# Patient Record
Sex: MALE | Race: WHITE | Employment: FULL TIME | URBAN - METROPOLITAN AREA
[De-identification: names, ages, dates, MRNs, and addresses within clinical notes are randomized per-mention and may not be internally consistent; named-entity substitution may affect disease eponyms.]

---

## 2018-02-16 PROBLEM — G47.9 SLEEP DISTURBANCE: Status: ACTIVE | Noted: 2018-02-16

## 2018-02-16 PROBLEM — F41.9 ANXIETY: Status: ACTIVE | Noted: 2018-02-16

## 2018-04-09 DIAGNOSIS — E78.00 PURE HYPERCHOLESTEROLEMIA: ICD-10-CM

## 2018-04-09 RX ORDER — ROSUVASTATIN CALCIUM 40 MG/1
40 TABLET, COATED ORAL DAILY
Qty: 90 TABLET | Refills: 1 | Status: SHIPPED | OUTPATIENT
Start: 2018-04-09 | End: 2018-09-04 | Stop reason: SDUPTHER

## 2018-09-04 ENCOUNTER — HOSPITAL ENCOUNTER (OUTPATIENT)
Age: 58
Discharge: HOME | End: 2018-09-06
Payer: COMMERCIAL

## 2018-09-04 ENCOUNTER — OFFICE VISIT (OUTPATIENT)
Dept: FAMILY MEDICINE CLINIC | Age: 58
End: 2018-09-04
Payer: COMMERCIAL

## 2018-09-04 VITALS
DIASTOLIC BLOOD PRESSURE: 78 MMHG | OXYGEN SATURATION: 96 % | BODY MASS INDEX: 27.44 KG/M2 | HEIGHT: 71 IN | SYSTOLIC BLOOD PRESSURE: 128 MMHG | TEMPERATURE: 98.1 F | WEIGHT: 196 LBS | RESPIRATION RATE: 20 BRPM | HEART RATE: 129 BPM

## 2018-09-04 DIAGNOSIS — Z00.00 ANNUAL PHYSICAL EXAM: Primary | ICD-10-CM

## 2018-09-04 DIAGNOSIS — G47.9 SLEEP DISTURBANCE: ICD-10-CM

## 2018-09-04 DIAGNOSIS — G47.00 INSOMNIA, UNSPECIFIED TYPE: ICD-10-CM

## 2018-09-04 DIAGNOSIS — M25.512 CHRONIC LEFT SHOULDER PAIN: ICD-10-CM

## 2018-09-04 DIAGNOSIS — I48.0 PAROXYSMAL ATRIAL FIBRILLATION (HCC): ICD-10-CM

## 2018-09-04 DIAGNOSIS — Z11.59 ENCOUNTER FOR HEPATITIS C SCREENING TEST FOR LOW RISK PATIENT: ICD-10-CM

## 2018-09-04 DIAGNOSIS — E78.00 PURE HYPERCHOLESTEROLEMIA: ICD-10-CM

## 2018-09-04 DIAGNOSIS — G89.29 CHRONIC LEFT SHOULDER PAIN: ICD-10-CM

## 2018-09-04 DIAGNOSIS — H91.92 HEARING LOSS OF LEFT EAR, UNSPECIFIED HEARING LOSS TYPE: ICD-10-CM

## 2018-09-04 DIAGNOSIS — K21.9 GASTROESOPHAGEAL REFLUX DISEASE, ESOPHAGITIS PRESENCE NOT SPECIFIED: ICD-10-CM

## 2018-09-04 DIAGNOSIS — Z23 NEED FOR INFLUENZA VACCINATION: ICD-10-CM

## 2018-09-04 PROCEDURE — 90686 IIV4 VACC NO PRSV 0.5 ML IM: CPT | Performed by: FAMILY MEDICINE

## 2018-09-04 PROCEDURE — 99396 PREV VISIT EST AGE 40-64: CPT | Performed by: FAMILY MEDICINE

## 2018-09-04 PROCEDURE — 86803 HEPATITIS C AB TEST: CPT

## 2018-09-04 PROCEDURE — 90471 IMMUNIZATION ADMIN: CPT | Performed by: FAMILY MEDICINE

## 2018-09-04 PROCEDURE — 99213 OFFICE O/P EST LOW 20 MIN: CPT | Performed by: FAMILY MEDICINE

## 2018-09-04 PROCEDURE — 93000 ELECTROCARDIOGRAM COMPLETE: CPT | Performed by: FAMILY MEDICINE

## 2018-09-04 RX ORDER — TRAZODONE HYDROCHLORIDE 50 MG/1
50 TABLET ORAL NIGHTLY
Qty: 90 TABLET | Refills: 1 | Status: SHIPPED | OUTPATIENT
Start: 2018-09-04 | End: 2019-02-13 | Stop reason: SDUPTHER

## 2018-09-04 RX ORDER — ATENOLOL 50 MG/1
50 TABLET ORAL DAILY
Qty: 90 TABLET | Refills: 1 | Status: SHIPPED | OUTPATIENT
Start: 2018-09-04 | End: 2019-02-13 | Stop reason: SDUPTHER

## 2018-09-04 RX ORDER — OMEPRAZOLE 20 MG/1
20 CAPSULE, DELAYED RELEASE ORAL DAILY
Qty: 90 CAPSULE | Refills: 1 | Status: SHIPPED | OUTPATIENT
Start: 2018-09-04 | End: 2019-02-13 | Stop reason: SDUPTHER

## 2018-09-04 RX ORDER — LANOLIN ALCOHOL/MO/W.PET/CERES
6 CREAM (GRAM) TOPICAL DAILY
Qty: 60 TABLET | Refills: 3 | Status: CANCELLED | OUTPATIENT
Start: 2018-09-04

## 2018-09-04 RX ORDER — ATENOLOL 50 MG/1
50 TABLET ORAL NIGHTLY
Qty: 30 TABLET | Refills: 0 | Status: SHIPPED | OUTPATIENT
Start: 2018-09-04 | End: 2019-01-10 | Stop reason: SDUPTHER

## 2018-09-04 RX ORDER — NAPROXEN 500 MG/1
500 TABLET ORAL 2 TIMES DAILY WITH MEALS
Qty: 28 TABLET | Refills: 0 | Status: SHIPPED | OUTPATIENT
Start: 2018-09-04 | End: 2018-12-21

## 2018-09-04 RX ORDER — ROSUVASTATIN CALCIUM 40 MG/1
40 TABLET, COATED ORAL DAILY
Qty: 90 TABLET | Refills: 1 | Status: SHIPPED | OUTPATIENT
Start: 2018-09-04 | End: 2019-02-13 | Stop reason: SDUPTHER

## 2018-09-04 ASSESSMENT — PATIENT HEALTH QUESTIONNAIRE - PHQ9
SUM OF ALL RESPONSES TO PHQ9 QUESTIONS 1 & 2: 0
SUM OF ALL RESPONSES TO PHQ QUESTIONS 1-9: 0
1. LITTLE INTEREST OR PLEASURE IN DOING THINGS: 0
2. FEELING DOWN, DEPRESSED OR HOPELESS: 0
SUM OF ALL RESPONSES TO PHQ QUESTIONS 1-9: 0

## 2018-09-04 NOTE — PROGRESS NOTES
FM Progress Note    Subjective: Joie Goodwin is a 62y.o. year old male here for Afib. Health Maintenance Due   Topic Date Due    Hepatitis C screen  1960    HIV screen  05/31/1975    Shingles Vaccine (1 of 2 - 2 Dose Series) 05/31/2010    Flu vaccine (1) 09/01/2018       Feels palps frequently. No increased frequency lately. Episodes last only briefly. Stress related, but stress better lately now that he's started work. Ran out of atenolol last night. L shoulder pain for 6 months. No increase lately. Worse w/ certain motions. EKG today showing Afib. No significant change from previous. Past Medical History:   Diagnosis Date    Anticoagulant long-term use     Anxiety     Atrial fibrillation (HCC)     GERD (gastroesophageal reflux disease)     Hyperlipidemia     Insomnia     Paroxysmal atrial fibrillation [facility])      Past Surgical History:   Procedure Laterality Date    APPENDECTOMY      CATARACT REMOVAL  2006    bilateral    COLONOSCOPY  1/2010    COLONOSCOPY  04/14/2017    ENDOSCOPY, COLON, DIAGNOSTIC      EYE SURGERY  2006    bilateral cataract    TONSILLECTOMY      UPPER GASTROINTESTINAL ENDOSCOPY  2007    VASECTOMY       Family History   Problem Relation Age of Onset    Hypertension Father     Cancer Father     High Blood Pressure Father     High Cholesterol Father     Obesity Father     Cancer Paternal Grandmother         68    Diabetes Mother         pre- daibetic    Stroke Paternal Grandfather     Cancer Sister      Social History     Social History    Marital status:      Spouse name: N/A    Number of children: N/A    Years of education: N/A     Occupational History    Not on file.      Social History Main Topics    Smoking status: Never Smoker    Smokeless tobacco: Never Used    Alcohol use No      Comment: occasionally     Drug use: No    Sexual activity: Yes     Other Topics Concern    Not on file     Social History Narrative   

## 2018-09-04 NOTE — PROGRESS NOTES
abdominal pain   No nausea, no vomiting   No change in bowels, no diarrhea or constipation   No blood in stool or blood per rectum  : No dysuria, no hematuria, no frequency, no incontinence  MS: No back pain   No arthralgias   No myalgias   No gait issues  Endo: No polyuria, polydipsia, polyphagia   No temperature intolerance  Neuro: No headaches   No syncope/near syncope   No dizziness  Psych: No mood changes   No dysphoria   No anxiety   No sleep disturbance   No suicidal or homicidal ideation           PHYSICAL EXAMINATION:  /78   Pulse 129   Temp 98.1 °F (36.7 °C) (Oral)   Resp 20   Ht 5' 11\" (1.803 m)   Wt 196 lb (88.9 kg)   SpO2 96%   BMI 27.34 kg/m²   General: Well nourished, well developed, no acute distress  Eyes:  PERRL, EOMI  ENT:  Nasal:  No mucosal edema     No nasal drainage   Oral:  mucosa moist and pink             no posterior pharyngeal drainage     no posterior pharyngeal exudate  Neck:  Supple   No palpable cervical lymphoadenopathy   Thyroid without mass or enlargement  Resp: Lungs CTAB   Equal and adequate air exchange without accessory muscle use   No rales, rhonchi or wheeze  CV: S1S2.  Irregularly irregular   No murmur   Intact distal pulses  GI: Abdomen Soft, non tender, non distended   Normoactive bowel sounds   No palpable hepatosplenomegaly  MS: Physiologic ROM of all extremities    Intact distal pulses   No clubbing, cyanosis or edema  Skin Warm and dry; no rash or lesion  Neuro: Alert and oriented; motor and sensation intact       Lab Results   Component Value Date    WBC 6.8 05/14/2016    HGB 15.7 05/14/2016    HCT 46.6 05/14/2016     05/14/2016    CHOL 168 05/04/2017    TRIG 87 05/04/2017    HDL 50 05/04/2017    ALT 25 05/14/2016    AST 18 05/14/2016     01/16/2018    K 5.0 01/16/2018     01/16/2018    CREATININE 0.9 01/16/2018    BUN 10 01/16/2018    CO2 23 01/16/2018    TSH 1.790 05/14/2016    PSA 0.48 01/16/2018    LABA1C 6.0 02/16/2018          I have reviewed this patient's previous records. I have reviewed this patient's labs. I have reviewed this patient's medications. Jennifer Obando was seen today for irregular heart beat and shoulder pain. Diagnoses and all orders for this visit:    Annual physical exam    Paroxysmal atrial fibrillation  -     atenolol (TENORMIN) 50 MG tablet; Take 1 tablet by mouth nightly  -     EKG 12 Lead; Future  -     EKG 12 Lead  -     atenolol (TENORMIN) 50 MG tablet; Take 1 tablet by mouth daily    Pure hypercholesterolemia  -     rosuvastatin (CRESTOR) 40 MG tablet; Take 1 tablet by mouth daily    Insomnia, unspecified type  -     traZODone (DESYREL) 50 MG tablet; Take 1 tablet by mouth nightly    Sleep disturbance  -     traZODone (DESYREL) 50 MG tablet; Take 1 tablet by mouth nightly    Gastroesophageal reflux disease, esophagitis presence not specified  -     omeprazole (PRILOSEC) 20 MG delayed release capsule; Take 1 capsule by mouth daily    Encounter for hepatitis C screening test for low risk patient  -     HEPATITIS C ANTIBODY; Future    Chronic left shoulder pain  -     naproxen (NAPROXEN) 500 MG EC tablet; Take 1 tablet by mouth 2 times daily (with meals) for 14 days  -     [facility] - Ned Mixon    Other orders  -     Cancel: HIV Screen; Future  -     Cancel: Zoster recombinant ([address] [place])  -     Cancel: melatonin 3 MG TABS tablet; Take 2 tablets by mouth daily      Patient Instructions   Please call every pharmacy around and ask if they provide the new shingles vaccine and how much it costs. If it's affordable please get it and let me know when you've received it. For the next two weeks double the omeprazole while you take the naproxen. Return in about 6 months (around 3/4/2019) for Afib.          Electronically signed by Khushboo Seals MD on 9/4/2018 at 10:20 AM

## 2018-09-05 LAB — HEPATITIS C ANTIBODY INTERPRETATION: NORMAL

## 2018-09-12 ENCOUNTER — HOSPITAL ENCOUNTER (OUTPATIENT)
Dept: PHYSICAL THERAPY | Age: 58
Setting detail: THERAPIES SERIES
Discharge: HOME | End: 2018-09-12
Payer: COMMERCIAL

## 2018-09-12 PROCEDURE — G8984 CARRY CURRENT STATUS: HCPCS | Performed by: PHYSICAL THERAPIST

## 2018-09-12 PROCEDURE — 97035 APP MDLTY 1+ULTRASOUND EA 15: CPT | Performed by: PHYSICAL THERAPIST

## 2018-09-12 PROCEDURE — G8985 CARRY GOAL STATUS: HCPCS | Performed by: PHYSICAL THERAPIST

## 2018-09-12 PROCEDURE — 97161 PT EVAL LOW COMPLEX 20 MIN: CPT | Performed by: PHYSICAL THERAPIST

## 2018-09-12 ASSESSMENT — PAIN DESCRIPTION - DESCRIPTORS: DESCRIPTORS: BURNING

## 2018-09-12 ASSESSMENT — PAIN DESCRIPTION - ORIENTATION: ORIENTATION: LEFT

## 2018-09-12 ASSESSMENT — PAIN DESCRIPTION - PROGRESSION: CLINICAL_PROGRESSION: GRADUALLY WORSENING

## 2018-09-12 ASSESSMENT — PAIN DESCRIPTION - FREQUENCY: FREQUENCY: INTERMITTENT

## 2018-09-12 ASSESSMENT — PAIN DESCRIPTION - LOCATION: LOCATION: SHOULDER

## 2018-09-12 ASSESSMENT — PAIN DESCRIPTION - ONSET: ONSET: GRADUAL

## 2018-09-12 NOTE — PROGRESS NOTES
[address] [address] [place]      Phone: [phone]  Fax: [phone]    Physical Therapy Daily Treatment Note  Date:  2018    Patient Name:  Kay Lira    :  1960  MRN: [id number]    Restrictions/Precautions:    Diagnosis:  Chronic pain L shoulder  Treatment Diagnosis:    Insurance/Certification information:  [facility]  Referring Physician:  Derick Haley MD  Plan of care signed (Y/N):    Visit# / total visits:  -  Pain level: 5-/10   Time In:  0800  Time Out:  08    Subjective:  See evaluation    Exercises:  Exercise/Equipment Resistance/Repetitions Other comments     Biceps stretch in doorway 30 sec x 2 reps                                                                                                                                       Other Therapeutic Activities:  PT evaluation completed    Home Exercise Program:  18 - ice massage, biceps stretch    Manual Treatments:  N/A    Modalities:  US to L biceps tendon, 1.0 MHz, 50%, 1.3 W/cm², x 8 minutes    Timed Code Treatment Minutes:  10    Total Treatment Minutes:  30    Treatment/Activity Tolerance:  [x] Patient tolerated treatment well [] Patient limited by fatigue  [] Patient limited by pain  [] Patient limited by other medical complications  [] Other:     Prognosis: [x] Good [] Fair  [] Poor    Patient Requires Follow-up: [x] Yes  [] No    Plan:   [] Continue per plan of care [] Alter current plan (see comments)  [x] Plan of care initiated [] Hold pending MD visit [] Discharge  Plan for Next Session:        Electronically signed by:  Fam Kowalski, PT [id number]

## 2018-09-12 NOTE — PROGRESS NOTES
Physical Therapy  Initial Assessment  Date: 2018  Patient Name: Nona Austin  MRN: [id number]  : 1960     Subjective   General  Additional Pertinent Hx: Pt presents to PT due to incidious onset of anterior L shoulder pain in 2018. Referring Practitioner: Aida Plata MD  Referral Date : 18  Diagnosis: Chronic pain L shouder  PT Visit Information  PT Insurance Information: [facility]  Subjective  Subjective: Pt reports pain in the L anterior shoulder region. He reports occasional, less frequent burning pain in the shoulder blade.   Pain Screening  Patient Currently in Pain: Yes  Pain Assessment  Pain Assessment: 0-10  Pain Level:  (5-6/10)  Pain Location: Shoulder  Pain Orientation: Left  Pain Descriptors: Burning  Pain Frequency: Intermittent  Pain Onset: Gradual  Clinical Progression: Gradually worsening  Effect of Pain on Daily Activities: Pain with abduction and external rotation of the shoulder  Patient's Stated Pain Goal: No pain  Pain Intervention(s): Medication (see eMar)  Vital Signs  Patient Currently in Pain: Yes      Social/Functional History  Social/Functional History  Occupation: Full time employment  Type of occupation:   Additional Comments: Pt is R hand dominent  Objective     Observation/Palpation  Palpation: Tenderness to palpation over L biceps tendon  Observation: Occasional compensatory, guarded movements noted in L shoulder    AROM RUE (degrees)  RUE AROM : WNL  AROM LUE (degrees)  LUE AROM : WNL    Strength RUE  Strength RUE: WNL  Strength LUE  Strength LUE: WNL     Additional Measures  Special Tests: (+) Speed's test     Assessment   Conditions Requiring Skilled Therapeutic Intervention  Body structures, Functions, Activity limitations: Decreased endurance;Decreased high-level IADLs  Assessment: Pain that interferes with pt quality of life  Prognosis: Good  Decision Making: Low Complexity  REQUIRES PT FOLLOW UP: Yes  Activity Tolerance  Activity Tolerance:

## 2018-09-18 ENCOUNTER — HOSPITAL ENCOUNTER (OUTPATIENT)
Dept: PHYSICAL THERAPY | Age: 58
Setting detail: THERAPIES SERIES
Discharge: HOME | End: 2018-09-18
Payer: COMMERCIAL

## 2018-09-18 PROCEDURE — 97110 THERAPEUTIC EXERCISES: CPT | Performed by: PHYSICAL THERAPIST

## 2018-09-18 PROCEDURE — 97035 APP MDLTY 1+ULTRASOUND EA 15: CPT | Performed by: PHYSICAL THERAPIST

## 2018-09-18 NOTE — PROGRESS NOTES
[address]                  [address]      Phone: [phone]  Fax: [phone]    Physical Therapy Daily Treatment Note  Date:  2018    Patient Name:  Ansley Rice    :  1960  MRN: [id number]    Restrictions/Precautions:    Diagnosis:  Chronic pain L shoulder  Treatment Diagnosis:    Insurance/Certification information:  [facility]  Referring Physician:  Rossy Ghotra MD  Plan of care signed (Y/N):    Visit# / total visits:  -  Pain level: 2-310   Time In:  07  Time Out:  4351    Subjective:  Pt reports that his shoulder is feeling a lot better than it did last week. Reports good tolerance to HEP.     Exercises:  Exercise/Equipment Resistance/Repetitions Other comments     Biceps stretch in doorway 30 sec x 2 reps      UBE 2 min fwd/2 min bwd      Corner stretch 30 sec x 2 reps                                                                                                                         Other Therapeutic Activities:  N/A    Home Exercise Program:  18 - ice massage, biceps stretch    Manual Treatments:  N/A    Modalities:  US to L biceps tendon, 1.0 MHz, 50%, 1.3 W/cm², x 8 minutes    Timed Code Treatment Minutes:  23    Total Treatment Minutes:  23    Treatment/Activity Tolerance:  [x] Patient tolerated treatment well [] Patient limited by fatigue  [] Patient limited by pain  [] Patient limited by other medical complications  [] Other:     Prognosis: [x] Good [] Fair  [] Poor    Patient Requires Follow-up: [x] Yes  [] No    Plan:   [x] Continue per plan of care [] Alter current plan (see comments)  [] Plan of care initiated [] Hold pending MD visit [] Discharge  Plan for Next Session:        Electronically signed by:  Won Collado, PT [id number]

## 2018-09-20 ENCOUNTER — HOSPITAL ENCOUNTER (OUTPATIENT)
Dept: PHYSICAL THERAPY | Age: 58
Setting detail: THERAPIES SERIES
Discharge: HOME | End: 2018-09-20
Payer: COMMERCIAL

## 2018-09-20 PROCEDURE — 97035 APP MDLTY 1+ULTRASOUND EA 15: CPT | Performed by: PHYSICAL THERAPIST

## 2018-09-20 PROCEDURE — 97110 THERAPEUTIC EXERCISES: CPT | Performed by: PHYSICAL THERAPIST

## 2018-09-20 NOTE — PROGRESS NOTES
[address]      Phone: [phone]  Fax: [phone]    Physical Therapy Daily Treatment Note  Date:  2018    Patient Name:  Jason Hidalgo    :  1960  MRN: [id number]    Restrictions/Precautions:    Diagnosis:  Chronic pain L shoulder  Treatment Diagnosis:    Insurance/Certification information:  [facility]  Referring Physician:  Zeny Benitez MD  Plan of care signed (Y/N):    Visit# / total visits:  3/6-  Pain level: 1-2/10   Time In:  0021  Time Out:  0615    Subjective:  Pt without significant complaint    Exercises:  Exercise/Equipment Resistance/Repetitions Other comments     Biceps stretch in doorway 30 sec x 2 reps      UBE 2 min fwd/2 min bwd      Corner stretch 30 sec x 2 reps      Rows  BTB 2 x 10 reps                                                                                                                  Other Therapeutic Activities:  N/A    Home Exercise Program:  18 - ice massage, biceps stretch    Manual Treatments:  N/A    Modalities:  US to L biceps tendon, 1.0 MHz, 50%, 1.3 W/cm², x 8 minutes    Timed Code Treatment Minutes:  23    Total Treatment Minutes:  23    Treatment/Activity Tolerance:  [x] Patient tolerated treatment well [] Patient limited by fatigue  [] Patient limited by pain  [] Patient limited by other medical complications  [] Other:     Prognosis: [x] Good [] Fair  [] Poor    Patient Requires Follow-up: [x] Yes  [] No    Plan:   [x] Continue per plan of care [] Alter current plan (see comments)  [] Plan of care initiated [] Hold pending MD visit [] Discharge  Plan for Next Session:        Electronically signed by:  Annel Rios, PT [id number]

## 2018-09-25 ENCOUNTER — HOSPITAL ENCOUNTER (OUTPATIENT)
Dept: PHYSICAL THERAPY | Age: 58
Setting detail: THERAPIES SERIES
Discharge: HOME | End: 2018-09-25
Payer: COMMERCIAL

## 2018-09-27 ENCOUNTER — HOSPITAL ENCOUNTER (OUTPATIENT)
Dept: PHYSICAL THERAPY | Age: 58
Setting detail: THERAPIES SERIES
Discharge: HOME | End: 2018-09-27
Payer: COMMERCIAL

## 2018-10-05 ENCOUNTER — OFFICE VISIT (OUTPATIENT)
Dept: FAMILY MEDICINE CLINIC | Age: 58
End: 2018-10-05
Payer: COMMERCIAL

## 2018-10-05 VITALS
RESPIRATION RATE: 16 BRPM | HEART RATE: 71 BPM | HEIGHT: 71 IN | TEMPERATURE: 98.8 F | BODY MASS INDEX: 27.02 KG/M2 | OXYGEN SATURATION: 98 % | DIASTOLIC BLOOD PRESSURE: 78 MMHG | SYSTOLIC BLOOD PRESSURE: 112 MMHG | WEIGHT: 193 LBS

## 2018-10-05 DIAGNOSIS — G89.29 CHRONIC LEFT SHOULDER PAIN: ICD-10-CM

## 2018-10-05 DIAGNOSIS — K21.9 GASTROESOPHAGEAL REFLUX DISEASE, ESOPHAGITIS PRESENCE NOT SPECIFIED: ICD-10-CM

## 2018-10-05 DIAGNOSIS — M25.512 CHRONIC LEFT SHOULDER PAIN: ICD-10-CM

## 2018-10-05 DIAGNOSIS — I48.0 PAROXYSMAL ATRIAL FIBRILLATION (HCC): Primary | ICD-10-CM

## 2018-10-05 PROCEDURE — 3017F COLORECTAL CA SCREEN DOC REV: CPT | Performed by: FAMILY MEDICINE

## 2018-10-05 PROCEDURE — G8427 DOCREV CUR MEDS BY ELIG CLIN: HCPCS | Performed by: FAMILY MEDICINE

## 2018-10-05 PROCEDURE — G8419 CALC BMI OUT NRM PARAM NOF/U: HCPCS | Performed by: FAMILY MEDICINE

## 2018-10-05 PROCEDURE — G8482 FLU IMMUNIZE ORDER/ADMIN: HCPCS | Performed by: FAMILY MEDICINE

## 2018-10-05 PROCEDURE — 1036F TOBACCO NON-USER: CPT | Performed by: FAMILY MEDICINE

## 2018-10-05 PROCEDURE — 99214 OFFICE O/P EST MOD 30 MIN: CPT | Performed by: FAMILY MEDICINE

## 2018-10-05 NOTE — PATIENT INSTRUCTIONS
Continue the exercises for your shoulder. Let me know if you need any more naproxen. Continue the omeprazole for GERD. Continue the atenolol for Afib.

## 2018-12-21 ENCOUNTER — OFFICE VISIT (OUTPATIENT)
Dept: FAMILY MEDICINE CLINIC | Age: 58
End: 2018-12-21
Payer: COMMERCIAL

## 2018-12-21 VITALS
RESPIRATION RATE: 14 BRPM | OXYGEN SATURATION: 96 % | DIASTOLIC BLOOD PRESSURE: 84 MMHG | WEIGHT: 196 LBS | SYSTOLIC BLOOD PRESSURE: 112 MMHG | BODY MASS INDEX: 27.44 KG/M2 | HEART RATE: 94 BPM | HEIGHT: 71 IN

## 2018-12-21 DIAGNOSIS — R39.89 URINARY PROBLEM: Primary | ICD-10-CM

## 2018-12-21 LAB
APPEARANCE FLUID: NORMAL
BILIRUBIN, POC: NORMAL
BLOOD URINE, POC: NORMAL
CLARITY, POC: CLEAR
COLOR, POC: YELLOW
GLUCOSE URINE, POC: NORMAL
KETONES, POC: NORMAL
LEUKOCYTE EST, POC: NORMAL
NITRITE, POC: NORMAL
PH, POC: 5.5
PROTEIN, POC: NORMAL
SPECIFIC GRAVITY, POC: <=1.005
UROBILINOGEN, POC: 0.2

## 2018-12-21 PROCEDURE — G8482 FLU IMMUNIZE ORDER/ADMIN: HCPCS | Performed by: FAMILY MEDICINE

## 2018-12-21 PROCEDURE — 3017F COLORECTAL CA SCREEN DOC REV: CPT | Performed by: FAMILY MEDICINE

## 2018-12-21 PROCEDURE — 99213 OFFICE O/P EST LOW 20 MIN: CPT | Performed by: FAMILY MEDICINE

## 2018-12-21 PROCEDURE — 1036F TOBACCO NON-USER: CPT | Performed by: FAMILY MEDICINE

## 2018-12-21 PROCEDURE — 81002 URINALYSIS NONAUTO W/O SCOPE: CPT | Performed by: FAMILY MEDICINE

## 2018-12-21 PROCEDURE — G8427 DOCREV CUR MEDS BY ELIG CLIN: HCPCS | Performed by: FAMILY MEDICINE

## 2018-12-21 PROCEDURE — G8419 CALC BMI OUT NRM PARAM NOF/U: HCPCS | Performed by: FAMILY MEDICINE

## 2018-12-21 NOTE — PROGRESS NOTES
Subjective:     Patient: Leland Melvin is a 62 y.o. male    CC:Dysuria Gleda Ruffing)    HPI Hard time peeing for last couple of days. Maybe some pain. Gentry frequency or urgency. Denies discharge. Not sure about fever or chills. Ran out of trazodone this week. No change in diet or drinking. BM ok. Usually happens around 4 in the morning. Review of Systems  See HPI for additional ROS    No Known Allergies  Current Outpatient Prescriptions on File Prior to Visit   Medication Sig Dispense Refill    atenolol (TENORMIN) 50 MG tablet Take 1 tablet by mouth nightly 30 tablet 0    rosuvastatin (CRESTOR) 40 MG tablet Take 1 tablet by mouth daily 90 tablet 1    traZODone (DESYREL) 50 MG tablet Take 1 tablet by mouth nightly 90 tablet 1    omeprazole (PRILOSEC) 20 MG delayed release capsule Take 1 capsule by mouth daily 90 capsule 1    atenolol (TENORMIN) 50 MG tablet Take 1 tablet by mouth daily 90 tablet 1    melatonin 3 MG TABS tablet Take 2 tablets by mouth daily 60 tablet 3    Aspirin Buf,SzIof-AiCeb-RoBkb, (BUFFERED ASPIRIN) 325 MG TABS Take 325 mg by mouth daily. To check with dr turner: med pre op       No current facility-administered medications on file prior to visit.        Past Medical History:   Diagnosis Date    Anticoagulant long-term use     Anxiety     Atrial fibrillation (HCC)     GERD (gastroesophageal reflux disease)     Hyperlipidemia     Insomnia     Paroxysmal atrial fibrillation [facility])      Past Surgical History:   Procedure Laterality Date    APPENDECTOMY      CATARACT REMOVAL  2006    bilateral    COLONOSCOPY  1/2010    COLONOSCOPY  04/14/2017    ENDOSCOPY, COLON, DIAGNOSTIC      EYE SURGERY  2006    bilateral cataract    TONSILLECTOMY      UPPER GASTROINTESTINAL ENDOSCOPY  2007    VASECTOMY       Family History   Problem Relation Age of Onset    Hypertension Father     Cancer Father     High Blood Pressure Father     High Cholesterol Father     Obesity Father     Cancer

## 2019-01-10 ENCOUNTER — OFFICE VISIT (OUTPATIENT)
Dept: FAMILY MEDICINE CLINIC | Age: 59
End: 2019-01-10
Payer: COMMERCIAL

## 2019-01-10 ENCOUNTER — HOSPITAL ENCOUNTER (OUTPATIENT)
Age: 59
Discharge: HOME | End: 2019-01-12
Payer: COMMERCIAL

## 2019-01-10 VITALS
OXYGEN SATURATION: 98 % | WEIGHT: 195 LBS | BODY MASS INDEX: 27.3 KG/M2 | SYSTOLIC BLOOD PRESSURE: 120 MMHG | DIASTOLIC BLOOD PRESSURE: 80 MMHG | HEART RATE: 97 BPM | HEIGHT: 71 IN

## 2019-01-10 DIAGNOSIS — G89.29 CHRONIC LEFT SHOULDER PAIN: ICD-10-CM

## 2019-01-10 DIAGNOSIS — K21.9 GASTROESOPHAGEAL REFLUX DISEASE, ESOPHAGITIS PRESENCE NOT SPECIFIED: ICD-10-CM

## 2019-01-10 DIAGNOSIS — R73.9 HYPERGLYCEMIA: ICD-10-CM

## 2019-01-10 DIAGNOSIS — I48.0 PAROXYSMAL ATRIAL FIBRILLATION (HCC): ICD-10-CM

## 2019-01-10 DIAGNOSIS — M25.512 CHRONIC LEFT SHOULDER PAIN: ICD-10-CM

## 2019-01-10 DIAGNOSIS — I48.0 PAROXYSMAL ATRIAL FIBRILLATION (HCC): Primary | ICD-10-CM

## 2019-01-10 LAB
ANION GAP SERPL CALCULATED.3IONS-SCNC: 12 MMOL/L (ref 7–16)
BUN BLDV-MCNC: 13 MG/DL (ref 6–20)
CALCIUM SERPL-MCNC: 8.9 MG/DL (ref 8.6–10.2)
CHLORIDE BLD-SCNC: 98 MMOL/L (ref 98–107)
CO2: 27 MMOL/L (ref 22–29)
CREAT SERPL-MCNC: 1 MG/DL (ref 0.7–1.2)
GFR AFRICAN AMERICAN: >60
GFR NON-AFRICAN AMERICAN: >60 ML/MIN/1.73
GLUCOSE BLD-MCNC: 113 MG/DL (ref 74–99)
HBA1C MFR BLD: 6 %
MAGNESIUM: 2.4 MG/DL (ref 1.6–2.6)
POTASSIUM SERPL-SCNC: 4.6 MMOL/L (ref 3.5–5)
SODIUM BLD-SCNC: 137 MMOL/L (ref 132–146)

## 2019-01-10 PROCEDURE — G8427 DOCREV CUR MEDS BY ELIG CLIN: HCPCS | Performed by: FAMILY MEDICINE

## 2019-01-10 PROCEDURE — G8419 CALC BMI OUT NRM PARAM NOF/U: HCPCS | Performed by: FAMILY MEDICINE

## 2019-01-10 PROCEDURE — 3017F COLORECTAL CA SCREEN DOC REV: CPT | Performed by: FAMILY MEDICINE

## 2019-01-10 PROCEDURE — 99214 OFFICE O/P EST MOD 30 MIN: CPT | Performed by: FAMILY MEDICINE

## 2019-01-10 PROCEDURE — 83036 HEMOGLOBIN GLYCOSYLATED A1C: CPT | Performed by: FAMILY MEDICINE

## 2019-01-10 PROCEDURE — 83735 ASSAY OF MAGNESIUM: CPT

## 2019-01-10 PROCEDURE — G8482 FLU IMMUNIZE ORDER/ADMIN: HCPCS | Performed by: FAMILY MEDICINE

## 2019-01-10 PROCEDURE — 1036F TOBACCO NON-USER: CPT | Performed by: FAMILY MEDICINE

## 2019-01-10 PROCEDURE — 80048 BASIC METABOLIC PNL TOTAL CA: CPT

## 2019-02-13 DIAGNOSIS — G47.9 SLEEP DISTURBANCE: ICD-10-CM

## 2019-02-13 DIAGNOSIS — E78.00 PURE HYPERCHOLESTEROLEMIA: ICD-10-CM

## 2019-02-13 DIAGNOSIS — G47.00 INSOMNIA, UNSPECIFIED TYPE: ICD-10-CM

## 2019-02-13 DIAGNOSIS — K21.9 GASTROESOPHAGEAL REFLUX DISEASE, ESOPHAGITIS PRESENCE NOT SPECIFIED: ICD-10-CM

## 2019-02-13 DIAGNOSIS — I48.0 PAROXYSMAL ATRIAL FIBRILLATION (HCC): ICD-10-CM

## 2019-02-14 RX ORDER — ROSUVASTATIN CALCIUM 40 MG/1
40 TABLET, COATED ORAL DAILY
Qty: 90 TABLET | Refills: 1 | Status: SHIPPED | OUTPATIENT
Start: 2019-02-14 | End: 2019-08-11 | Stop reason: SDUPTHER

## 2019-02-14 RX ORDER — OMEPRAZOLE 20 MG/1
20 CAPSULE, DELAYED RELEASE ORAL DAILY
Qty: 90 CAPSULE | Refills: 1 | Status: SHIPPED | OUTPATIENT
Start: 2019-02-14 | End: 2019-08-11 | Stop reason: SDUPTHER

## 2019-02-14 RX ORDER — TRAZODONE HYDROCHLORIDE 50 MG/1
50 TABLET ORAL NIGHTLY
Qty: 90 TABLET | Refills: 1 | Status: SHIPPED | OUTPATIENT
Start: 2019-02-14 | End: 2019-08-11 | Stop reason: SDUPTHER

## 2019-02-14 RX ORDER — ATENOLOL 50 MG/1
50 TABLET ORAL DAILY
Qty: 90 TABLET | Refills: 1 | Status: SHIPPED | OUTPATIENT
Start: 2019-02-14 | End: 2019-08-11 | Stop reason: SDUPTHER

## 2019-06-21 ENCOUNTER — HOSPITAL ENCOUNTER (OUTPATIENT)
Age: 59
Discharge: HOME | End: 2019-06-23
Payer: COMMERCIAL

## 2019-06-21 ENCOUNTER — OFFICE VISIT (OUTPATIENT)
Dept: FAMILY MEDICINE CLINIC | Age: 59
End: 2019-06-21
Payer: COMMERCIAL

## 2019-06-21 VITALS
DIASTOLIC BLOOD PRESSURE: 78 MMHG | TEMPERATURE: 97.8 F | HEIGHT: 71 IN | SYSTOLIC BLOOD PRESSURE: 117 MMHG | WEIGHT: 190 LBS | HEART RATE: 67 BPM | RESPIRATION RATE: 20 BRPM | BODY MASS INDEX: 26.6 KG/M2

## 2019-06-21 DIAGNOSIS — Z12.5 SCREENING FOR PROSTATE CANCER: Primary | ICD-10-CM

## 2019-06-21 DIAGNOSIS — K21.9 GASTROESOPHAGEAL REFLUX DISEASE, ESOPHAGITIS PRESENCE NOT SPECIFIED: ICD-10-CM

## 2019-06-21 DIAGNOSIS — I48.0 PAROXYSMAL ATRIAL FIBRILLATION (HCC): ICD-10-CM

## 2019-06-21 DIAGNOSIS — F41.9 ANXIETY: ICD-10-CM

## 2019-06-21 DIAGNOSIS — Z12.5 SCREENING FOR PROSTATE CANCER: ICD-10-CM

## 2019-06-21 LAB — PROSTATE SPECIFIC ANTIGEN: 0.6 NG/ML (ref 0–4)

## 2019-06-21 PROCEDURE — G0103 PSA SCREENING: HCPCS

## 2019-06-21 PROCEDURE — 3017F COLORECTAL CA SCREEN DOC REV: CPT | Performed by: FAMILY MEDICINE

## 2019-06-21 PROCEDURE — 99214 OFFICE O/P EST MOD 30 MIN: CPT | Performed by: FAMILY MEDICINE

## 2019-06-21 PROCEDURE — G8427 DOCREV CUR MEDS BY ELIG CLIN: HCPCS | Performed by: FAMILY MEDICINE

## 2019-06-21 PROCEDURE — 1036F TOBACCO NON-USER: CPT | Performed by: FAMILY MEDICINE

## 2019-06-21 PROCEDURE — G8419 CALC BMI OUT NRM PARAM NOF/U: HCPCS | Performed by: FAMILY MEDICINE

## 2019-06-21 ASSESSMENT — PATIENT HEALTH QUESTIONNAIRE - PHQ9
SUM OF ALL RESPONSES TO PHQ QUESTIONS 1-9: 0
2. FEELING DOWN, DEPRESSED OR HOPELESS: 0
SUM OF ALL RESPONSES TO PHQ9 QUESTIONS 1 & 2: 0
SUM OF ALL RESPONSES TO PHQ QUESTIONS 1-9: 0
1. LITTLE INTEREST OR PLEASURE IN DOING THINGS: 0

## 2019-06-21 NOTE — PATIENT INSTRUCTIONS
Please call every pharmacy around and ask if they provide the new shingles vaccine and how much it costs. If it's affordable please get it and let me know when you've received it.

## 2019-06-21 NOTE — PROGRESS NOTES
Social History     Socioeconomic History    Marital status:      Spouse name: Not on file    Number of children: Not on file    Years of education: Not on file    Highest education level: Not on file   Occupational History    Not on file   Social Needs    Financial resource strain: Not on file    Food insecurity:     Worry: Not on file     Inability: Not on file    Transportation needs:     Medical: Not on file     Non-medical: Not on file   Tobacco Use    Smoking status: Never Smoker    Smokeless tobacco: Never Used   Substance and Sexual Activity    Alcohol use: No     Alcohol/week: 0.0 oz     Comment: occasionally     Drug use: No    Sexual activity: Yes   Lifestyle    Physical activity:     Days per week: Not on file     Minutes per session: Not on file    Stress: Not on file   Relationships    Social connections:     Talks on phone: Not on file     Gets together: Not on file     Attends [employer] service: Not on file     Active member of club or organization: Not on file     Attends meetings of clubs or organizations: Not on file     Relationship status: Not on file    Intimate partner violence:     Fear of current or ex partner: Not on file     Emotionally abused: Not on file     Physically abused: Not on file     Forced sexual activity: Not on file   Other Topics Concern    Not on file   Social History Narrative    Not on file       Review Of Systems (bold are positive, all else are negative)  Gen: No fevers, sweats, chills  No fatigue  No unintentional weight changes  ENT: No earache, no drainage  No nasal congestion  No sinus tenderness  No sore throat  No swallowing issues  Resp: No cough, shortness of breath, wheeze, chest congestion  CV: No chest pain, palpitation, edema      Objective:  /78   Pulse 67   Temp 97.8 °F (36.6 °C) (Oral)   Resp 20   Ht 5' 11\" (1.803 m)   Wt 190 lb (86.2 kg)   BMI 26.50 kg/m²   General appearance: NAD, alert and interacting appropriately  HEENT: NCAT, PERRLA, EOMI   Resp: CTAB, no WRC  CVS: irregularly irregular, no MRG  Abdomen: BS +, SNDNT  Extremities: No clubbing, cyanosis, or edema. Warm. Dry. FROM L shoulder       I have reviewed this patient's previous records. I have reviewed this patient's labs. I have reviewed this patient's medications. Assessment/Plan:    Fauzia Infante was seen today for mass and annual exam.    Diagnoses and all orders for this visit:    Screening for prostate cancer  -     PSA SCREENING; Future    Paroxysmal atrial fibrillation (HCC)    Gastroesophageal reflux disease, esophagitis presence not specified    Anxiety    see the [facility] counselor. Cont omeprazole for GERD. CTM Afib, and if any sxs worsen then come back/go to ER. Cont atenolol and asa. rtc 6 mo for Afib and anxiety    Patient Instructions   Please call every pharmacy around and ask if they provide the new shingles vaccine and how much it costs. If it's affordable please get it and let me know when you've received it. Greater than 50% of this 25 minute face-to-face patient encounter was spent counseling on The primary encounter diagnosis was Screening for prostate cancer. Diagnoses of Paroxysmal atrial fibrillation (HCC), Gastroesophageal reflux disease, esophagitis presence not specified, and Anxiety were also pertinent to this visit. .         Electronically signed by Aranza Mas MD on 6/21/2019 at 10:56 AM

## 2019-08-11 DIAGNOSIS — K21.9 GASTROESOPHAGEAL REFLUX DISEASE, ESOPHAGITIS PRESENCE NOT SPECIFIED: ICD-10-CM

## 2019-08-11 DIAGNOSIS — G47.9 SLEEP DISTURBANCE: ICD-10-CM

## 2019-08-11 DIAGNOSIS — E78.00 PURE HYPERCHOLESTEROLEMIA: ICD-10-CM

## 2019-08-11 DIAGNOSIS — I48.0 PAROXYSMAL ATRIAL FIBRILLATION (HCC): ICD-10-CM

## 2019-08-11 DIAGNOSIS — G47.00 INSOMNIA, UNSPECIFIED TYPE: ICD-10-CM

## 2019-08-12 RX ORDER — TRAZODONE HYDROCHLORIDE 50 MG/1
50 TABLET ORAL NIGHTLY
Qty: 90 TABLET | Refills: 1 | Status: SHIPPED | OUTPATIENT
Start: 2019-08-12 | End: 2019-09-17 | Stop reason: SDUPTHER

## 2019-08-12 RX ORDER — OMEPRAZOLE 20 MG/1
20 CAPSULE, DELAYED RELEASE ORAL DAILY
Qty: 90 CAPSULE | Refills: 1 | Status: SHIPPED | OUTPATIENT
Start: 2019-08-12 | End: 2019-09-17 | Stop reason: SDUPTHER

## 2019-08-12 RX ORDER — ROSUVASTATIN CALCIUM 40 MG/1
40 TABLET, COATED ORAL DAILY
Qty: 90 TABLET | Refills: 1 | Status: SHIPPED | OUTPATIENT
Start: 2019-08-12 | End: 2019-09-17 | Stop reason: SDUPTHER

## 2019-08-12 RX ORDER — ATENOLOL 50 MG/1
50 TABLET ORAL DAILY
Qty: 90 TABLET | Refills: 1 | Status: SHIPPED | OUTPATIENT
Start: 2019-08-12 | End: 2019-09-17 | Stop reason: SDUPTHER

## 2019-09-17 ENCOUNTER — TELEPHONE (OUTPATIENT)
Dept: FAMILY MEDICINE CLINIC | Age: 59
End: 2019-09-17

## 2019-09-17 DIAGNOSIS — K21.9 GASTROESOPHAGEAL REFLUX DISEASE, ESOPHAGITIS PRESENCE NOT SPECIFIED: ICD-10-CM

## 2019-09-17 DIAGNOSIS — E78.00 PURE HYPERCHOLESTEROLEMIA: ICD-10-CM

## 2019-09-17 DIAGNOSIS — I48.0 PAROXYSMAL ATRIAL FIBRILLATION (HCC): ICD-10-CM

## 2019-09-17 DIAGNOSIS — G47.9 SLEEP DISTURBANCE: ICD-10-CM

## 2019-09-17 DIAGNOSIS — G47.00 INSOMNIA, UNSPECIFIED TYPE: ICD-10-CM

## 2019-09-17 RX ORDER — OMEPRAZOLE 20 MG/1
20 CAPSULE, DELAYED RELEASE ORAL DAILY
Qty: 90 CAPSULE | Refills: 1 | Status: SHIPPED
Start: 2019-09-17 | End: 2020-03-18 | Stop reason: SDUPTHER

## 2019-09-17 RX ORDER — TRAZODONE HYDROCHLORIDE 50 MG/1
50 TABLET ORAL NIGHTLY
Qty: 90 TABLET | Refills: 1 | Status: SHIPPED
Start: 2019-09-17 | End: 2020-03-18 | Stop reason: SDUPTHER

## 2019-09-17 RX ORDER — ROSUVASTATIN CALCIUM 40 MG/1
40 TABLET, COATED ORAL DAILY
Qty: 90 TABLET | Refills: 1 | Status: SHIPPED
Start: 2019-09-17 | End: 2020-03-18 | Stop reason: SDUPTHER

## 2019-09-17 RX ORDER — ATENOLOL 50 MG/1
50 TABLET ORAL DAILY
Qty: 90 TABLET | Refills: 1 | Status: SHIPPED
Start: 2019-09-17 | End: 2020-03-18 | Stop reason: SDUPTHER

## 2019-12-20 ENCOUNTER — OFFICE VISIT (OUTPATIENT)
Dept: FAMILY MEDICINE CLINIC | Age: 59
End: 2019-12-20
Payer: COMMERCIAL

## 2019-12-20 ENCOUNTER — HOSPITAL ENCOUNTER (OUTPATIENT)
Age: 59
Discharge: HOME | End: 2019-12-22
Payer: COMMERCIAL

## 2019-12-20 VITALS
OXYGEN SATURATION: 98 % | WEIGHT: 192 LBS | DIASTOLIC BLOOD PRESSURE: 75 MMHG | BODY MASS INDEX: 26.88 KG/M2 | SYSTOLIC BLOOD PRESSURE: 113 MMHG | HEIGHT: 71 IN | HEART RATE: 59 BPM | RESPIRATION RATE: 20 BRPM

## 2019-12-20 DIAGNOSIS — E78.00 PURE HYPERCHOLESTEROLEMIA: ICD-10-CM

## 2019-12-20 DIAGNOSIS — Z00.00 ANNUAL PHYSICAL EXAM: Primary | ICD-10-CM

## 2019-12-20 DIAGNOSIS — I48.0 PAROXYSMAL ATRIAL FIBRILLATION (HCC): ICD-10-CM

## 2019-12-20 DIAGNOSIS — Z23 NEED FOR INFLUENZA VACCINATION: ICD-10-CM

## 2019-12-20 LAB
ALBUMIN SERPL-MCNC: 4.5 G/DL (ref 3.5–5.2)
ALP BLD-CCNC: 63 U/L (ref 40–129)
ALT SERPL-CCNC: 22 U/L (ref 0–40)
ANION GAP SERPL CALCULATED.3IONS-SCNC: 11 MMOL/L (ref 7–16)
AST SERPL-CCNC: 20 U/L (ref 0–39)
BILIRUB SERPL-MCNC: 0.5 MG/DL (ref 0–1.2)
BUN BLDV-MCNC: 15 MG/DL (ref 6–20)
CALCIUM SERPL-MCNC: 9.1 MG/DL (ref 8.6–10.2)
CHLORIDE BLD-SCNC: 101 MMOL/L (ref 98–107)
CHOLESTEROL, TOTAL: 193 MG/DL (ref 0–199)
CO2: 26 MMOL/L (ref 22–29)
CREAT SERPL-MCNC: 1 MG/DL (ref 0.7–1.2)
GFR AFRICAN AMERICAN: >60
GFR NON-AFRICAN AMERICAN: >60 ML/MIN/1.73
GLUCOSE BLD-MCNC: 115 MG/DL (ref 74–99)
HDLC SERPL-MCNC: 53 MG/DL
LDL CHOLESTEROL CALCULATED: 126 MG/DL (ref 0–99)
POTASSIUM SERPL-SCNC: 4.5 MMOL/L (ref 3.5–5)
SODIUM BLD-SCNC: 138 MMOL/L (ref 132–146)
TOTAL PROTEIN: 6.9 G/DL (ref 6.4–8.3)
TRIGL SERPL-MCNC: 72 MG/DL (ref 0–149)
VLDLC SERPL CALC-MCNC: 14 MG/DL

## 2019-12-20 PROCEDURE — 80053 COMPREHEN METABOLIC PANEL: CPT

## 2019-12-20 PROCEDURE — 90471 IMMUNIZATION ADMIN: CPT | Performed by: FAMILY MEDICINE

## 2019-12-20 PROCEDURE — 99396 PREV VISIT EST AGE 40-64: CPT | Performed by: FAMILY MEDICINE

## 2019-12-20 PROCEDURE — 80061 LIPID PANEL: CPT

## 2019-12-20 PROCEDURE — 90686 IIV4 VACC NO PRSV 0.5 ML IM: CPT | Performed by: FAMILY MEDICINE

## 2020-03-17 ENCOUNTER — TELEPHONE (OUTPATIENT)
Dept: FAMILY MEDICINE CLINIC | Age: 60
End: 2020-03-17

## 2020-03-17 NOTE — TELEPHONE ENCOUNTER
Patient is requesting a refill on his atenolol 50 mg , trazodone 50 mg , omeprazole 20 mg , and rosuvastatin 40 mg , please send to [facility] .          Contact # [phone]

## 2020-03-18 RX ORDER — TRAZODONE HYDROCHLORIDE 50 MG/1
50 TABLET ORAL NIGHTLY
Qty: 90 TABLET | Refills: 1 | Status: SHIPPED
Start: 2020-03-18 | End: 2020-09-16 | Stop reason: SDUPTHER

## 2020-03-18 RX ORDER — OMEPRAZOLE 20 MG/1
20 CAPSULE, DELAYED RELEASE ORAL DAILY
Qty: 90 CAPSULE | Refills: 1 | Status: SHIPPED
Start: 2020-03-18 | End: 2020-09-16 | Stop reason: SDUPTHER

## 2020-03-18 RX ORDER — ATENOLOL 50 MG/1
50 TABLET ORAL DAILY
Qty: 90 TABLET | Refills: 1 | Status: SHIPPED
Start: 2020-03-18 | End: 2020-09-16 | Stop reason: SDUPTHER

## 2020-03-18 RX ORDER — ROSUVASTATIN CALCIUM 40 MG/1
40 TABLET, COATED ORAL DAILY
Qty: 90 TABLET | Refills: 1 | Status: SHIPPED
Start: 2020-03-18 | End: 2020-09-16 | Stop reason: SDUPTHER

## 2020-06-18 ENCOUNTER — OFFICE VISIT (OUTPATIENT)
Dept: FAMILY MEDICINE CLINIC | Age: 60
End: 2020-06-18
Payer: COMMERCIAL

## 2020-06-18 VITALS
OXYGEN SATURATION: 95 % | BODY MASS INDEX: 27.24 KG/M2 | TEMPERATURE: 97.6 F | HEIGHT: 71 IN | RESPIRATION RATE: 16 BRPM | DIASTOLIC BLOOD PRESSURE: 80 MMHG | HEART RATE: 82 BPM | WEIGHT: 194.6 LBS | SYSTOLIC BLOOD PRESSURE: 122 MMHG

## 2020-06-18 LAB — HBA1C MFR BLD: 6.1 %

## 2020-06-18 PROCEDURE — 1036F TOBACCO NON-USER: CPT | Performed by: FAMILY MEDICINE

## 2020-06-18 PROCEDURE — G8419 CALC BMI OUT NRM PARAM NOF/U: HCPCS | Performed by: FAMILY MEDICINE

## 2020-06-18 PROCEDURE — 3017F COLORECTAL CA SCREEN DOC REV: CPT | Performed by: FAMILY MEDICINE

## 2020-06-18 PROCEDURE — G8427 DOCREV CUR MEDS BY ELIG CLIN: HCPCS | Performed by: FAMILY MEDICINE

## 2020-06-18 PROCEDURE — 83036 HEMOGLOBIN GLYCOSYLATED A1C: CPT | Performed by: FAMILY MEDICINE

## 2020-06-18 PROCEDURE — 99214 OFFICE O/P EST MOD 30 MIN: CPT | Performed by: FAMILY MEDICINE

## 2020-06-18 ASSESSMENT — PATIENT HEALTH QUESTIONNAIRE - PHQ9
SUM OF ALL RESPONSES TO PHQ QUESTIONS 1-9: 0
2. FEELING DOWN, DEPRESSED OR HOPELESS: 0
SUM OF ALL RESPONSES TO PHQ QUESTIONS 1-9: 0
1. LITTLE INTEREST OR PLEASURE IN DOING THINGS: 0

## 2020-06-18 NOTE — PROGRESS NOTES
FM Progress Note    Subjective: Jose Bhandari is a 61y.o. year old male here for Afib. Health Maintenance Due   Topic Date Due    HIV screen  05/31/1975    Shingles Vaccine (1 of 2) 05/31/2010    A1C test (Diabetic or Prediabetic)  01/10/2020     Declined HIV testing    GERD well controlled with the omeprazole. No blood in stool. No heartburn. Has been feeling more gassiness lately. No diarrhea or n/v. Occasional pain in RLQ, mild. Appendix has been removed. Has been going on for years, seems more frequent lately. Taking statin for dyslipidemia. Tolerating medicine well, no myalgias or GI upset. Lab Results   Component Value Date    CHOL 193 12/20/2019    CHOL 168 05/04/2017    CHOL 173 05/14/2016     Lab Results   Component Value Date    TRIG 72 12/20/2019    TRIG 87 05/04/2017    TRIG 49 05/14/2016     Lab Results   Component Value Date    HDL 53 12/20/2019    HDL 50 05/04/2017    HDL 53 05/14/2016     Lab Results   Component Value Date    LDLCALC 126 (H) 12/20/2019    LDLCALC 101 (H) 05/04/2017    LDLCALC 110 (H) 05/14/2016     The 10-year ASCVD risk score (Bri Small, et al., 2013) is: 7.4%    Values used to calculate the score:      Age: 61 years      Sex: Male      Is Non- : No      Diabetic: No      Tobacco smoker: No      Systolic Blood Pressure: 495 mmHg      Is BP treated: No      HDL Cholesterol: 53 mg/dL      Total Cholesterol: 193 mg/dL    For Afib on metoprolol and asa. No CP, SOB, palpitations, blurred vision, HA, lightheadedness, LOC or focal neurological deficits. Back to work. Depression is controlled. Mood is good. No SI or HI. Minimal anxiety due to stress.        Past Medical History:   Diagnosis Date    Anticoagulant long-term use     Anxiety     Atrial fibrillation (HCC)     GERD (gastroesophageal reflux disease)     Hyperlipidemia     Insomnia     Paroxysmal atrial fibrillation [facility])      Past Surgical History:   Procedure Laterality Date    APPENDECTOMY      CATARACT REMOVAL  2006    bilateral    COLONOSCOPY  1/2010    COLONOSCOPY  04/14/2017    ENDOSCOPY, COLON, DIAGNOSTIC      EYE SURGERY  2006    bilateral cataract    TONSILLECTOMY      UPPER GASTROINTESTINAL ENDOSCOPY  2007    VASECTOMY       Family History   Problem Relation Age of Onset    Hypertension Father     Cancer Father     High Blood Pressure Father     High Cholesterol Father     Obesity Father     Cancer Paternal Grandmother         68    Diabetes Mother         pre- daibetic    Stroke Paternal Grandfather     Cancer Sister      Social History     Socioeconomic History    Marital status:      Spouse name: Not on file    Number of children: Not on file    Years of education: Not on file    Highest education level: Not on file   Occupational History    Not on file   Social Needs    Financial resource strain: Not on file    Food insecurity     Worry: Not on file     Inability: Not on file    Transportation needs     Medical: Not on file     Non-medical: Not on file   Tobacco Use    Smoking status: Never Smoker    Smokeless tobacco: Never Used   Substance and Sexual Activity    Alcohol use: No     Alcohol/week: 0.0 standard drinks     Comment: occasionally     Drug use: No    Sexual activity: Yes   Lifestyle    Physical activity     Days per week: Not on file     Minutes per session: Not on file    Stress: Not on file   Relationships    Social connections     Talks on phone: Not on file     Gets together: Not on file     Attends Druze service: Not on file     Active member of club or organization: Not on file     Attends meetings of clubs or organizations: Not on file     Relationship status: Not on file    Intimate partner violence     Fear of current or ex partner: Not on file     Emotionally abused: Not on file     Physically abused: Not on file     Forced sexual activity: Not on file   Other Topics Concern    Not on file   Social soy milk. (Check sauces for dairy ingredients.)  Vegetables  Okay to eat: Bamboo shoots, bell peppers, bok olya, up to ½ cup of broccoli or cabbage (red or white), and cucumbers. Eggplant, green beans, lettuce, olives, parsnips, and potatoes are okay to eat. So are pumpkin, rutabaga, seaweed, sprouts, Swiss chard, and spinach. You can eat scallions (green part only) and squash (not butternut). You can eat tomatoes, turnips, watercress, yams, and zucchini. You can also have small amounts of artichoke hearts (from can, 1 oz), carrots, corn (½ cob), and sweet potato (½ cup). Avoid: Artichokes, asparagus, [place] sprouts, thu cabbage, cauliflower, and celery. And avoid garlic, leeks, mushrooms, okra, onions, scallions (white part), shallots, and peas. Fruits  Okay to eat: Bananas, blueberries, cantaloupe, coconut, grapes, and honeydew. Kiwi, santa, limes, oranges, passion fruit, papaya, and pineapple are also okay. You can eat plantain, raspberries, rhubarb, star fruit, strawberries, tangelo, and tangerine. You can also have small amounts of dried banana chips (up to 10 chips), dried cranberries (1 Tbsp), and shredded coconut (up to ¼ cup). Avoid: Apples, applesauce, apricots, avocados, blackberries, boysenberries, and cherries. Also avoid dates, figs, grapefruit, guava, lychee, and mangoes. Don't eat nectarines, peaches, pears, persimmon, plums, prunes, tamarillo, or watermelon. And limit most canned and dried fruits. Oils, spices, condiments, and sweeteners  Okay to eat: Vegetable oils (including garlic infused), butter, ghee, lard, and margarine (no trans fat). You can have most fresh herbs like basil, chives, coriander, zoran, parsley, rosemary and thyme. You can have salt, jams made from low-FODMAP fruits, mayonnaise, and mustard. Soy sauce, hot sauce (no garlic), tamari, and vinegar are also okay.  Sweeteners that are okay include sugar (sucrose), powdered (confectioner's) sugar, brown sugar, glucose, and maple syrup. You can also have some artificial sweeteners like aspartame, saccharine, and stevia. Avoid: Chutneys, hummus, jellies, garlic sauces, and gravies made with onion or garlic. Avoid pickles, relish, some salad dressings and soup stocks, salsa, and tomato paste. And avoid sauces and other foods with high fructose corn syrup, honey, molasses, and agave. Avoid artificial sweeteners (isomalt, mannitol, malitol, sorbitol, and xylitol). Avoid corn syrup solids, fructose, fruit juice concentrate, and polydextrose. Other foods and drinks  Okay to have: Water, soda water, tonic, soft drinks sweetened with sugar, ½ cup of low-FODMAP fruit juice, and most teas and alcohols. You can also eat foods made with baking powder and soda, cocoa, and gelatin. Avoid: Juices from high-FODMAP fruits and vegetables. And avoid fortified bea, chamomile and fennel teas, chicory-based drinks and coffee substitutes, and bouillon cubes. Follow-up care is a key part of your treatment and safety. Be sure to make and go to all appointments, and call your doctor if you are having problems. It's also a good idea to know your test results and keep a list of the medicines you take. Where can you learn more? Go to [email or web address] and sign in to your [facility] account. Enter L235 in the [facility] box to learn more about \"Learning About the Low FODMAP Diet for Irritable Bowel Syndrome (IBS). \"     If you do not have an account, please click on the \"Sign Up Now\" link. Current as of: August 22, 2019               Content Version: 12.5  © 2762-9232 Healthwise, Incorporated. Care instructions adapted under license by [facility]. If you have questions about a medical condition or this instruction, always ask your healthcare professional. [address]  any warranty or liability for your use of this information.            Return in about 6 months (around 12/18/2020) for annual

## 2020-09-16 ENCOUNTER — TELEPHONE (OUTPATIENT)
Dept: FAMILY MEDICINE CLINIC | Age: 60
End: 2020-09-16

## 2020-09-16 RX ORDER — ROSUVASTATIN CALCIUM 40 MG/1
40 TABLET, COATED ORAL DAILY
Qty: 90 TABLET | Refills: 1 | Status: SHIPPED
Start: 2020-09-16 | End: 2020-12-18 | Stop reason: SDUPTHER

## 2020-09-16 RX ORDER — ATENOLOL 50 MG/1
50 TABLET ORAL DAILY
Qty: 90 TABLET | Refills: 1 | Status: SHIPPED
Start: 2020-09-16 | End: 2020-12-18 | Stop reason: SDUPTHER

## 2020-09-16 RX ORDER — OMEPRAZOLE 20 MG/1
20 CAPSULE, DELAYED RELEASE ORAL DAILY
Qty: 90 CAPSULE | Refills: 1 | Status: SHIPPED
Start: 2020-09-16 | End: 2020-12-18 | Stop reason: SDUPTHER

## 2020-09-16 RX ORDER — TRAZODONE HYDROCHLORIDE 50 MG/1
50 TABLET ORAL NIGHTLY
Qty: 90 TABLET | Refills: 1 | Status: SHIPPED
Start: 2020-09-16 | End: 2020-12-18 | Stop reason: SDUPTHER

## 2020-09-16 NOTE — TELEPHONE ENCOUNTER
Pt called needs the following meds:    Atenolol  Trazodone  Omeprazole  Rusuvastatin    Send to [facility]

## 2020-12-18 ENCOUNTER — OFFICE VISIT (OUTPATIENT)
Dept: FAMILY MEDICINE CLINIC | Age: 60
End: 2020-12-18
Payer: COMMERCIAL

## 2020-12-18 VITALS
OXYGEN SATURATION: 98 % | HEIGHT: 71 IN | DIASTOLIC BLOOD PRESSURE: 79 MMHG | BODY MASS INDEX: 27.44 KG/M2 | SYSTOLIC BLOOD PRESSURE: 122 MMHG | TEMPERATURE: 97.2 F | HEART RATE: 75 BPM | WEIGHT: 196 LBS

## 2020-12-18 DIAGNOSIS — I48.0 PAROXYSMAL ATRIAL FIBRILLATION (HCC): ICD-10-CM

## 2020-12-18 DIAGNOSIS — E78.00 PURE HYPERCHOLESTEROLEMIA: ICD-10-CM

## 2020-12-18 DIAGNOSIS — Z12.5 SCREENING FOR PROSTATE CANCER: ICD-10-CM

## 2020-12-18 LAB
ALBUMIN SERPL-MCNC: 4.6 G/DL (ref 3.5–5.2)
ALP BLD-CCNC: 55 U/L (ref 40–129)
ALT SERPL-CCNC: 17 U/L (ref 0–40)
ANION GAP SERPL CALCULATED.3IONS-SCNC: 13 MMOL/L (ref 7–16)
AST SERPL-CCNC: 16 U/L (ref 0–39)
BILIRUB SERPL-MCNC: 0.5 MG/DL (ref 0–1.2)
BUN BLDV-MCNC: 15 MG/DL (ref 8–23)
CALCIUM SERPL-MCNC: 9.1 MG/DL (ref 8.6–10.2)
CHLORIDE BLD-SCNC: 103 MMOL/L (ref 98–107)
CHOLESTEROL, TOTAL: 177 MG/DL (ref 0–199)
CO2: 24 MMOL/L (ref 22–29)
CREAT SERPL-MCNC: 1 MG/DL (ref 0.7–1.2)
GFR AFRICAN AMERICAN: >60
GFR NON-AFRICAN AMERICAN: >60 ML/MIN/1.73
GLUCOSE BLD-MCNC: 117 MG/DL (ref 74–99)
HDLC SERPL-MCNC: 51 MG/DL
LDL CHOLESTEROL CALCULATED: 114 MG/DL (ref 0–99)
MAGNESIUM: 2.3 MG/DL (ref 1.6–2.6)
POTASSIUM SERPL-SCNC: 4.7 MMOL/L (ref 3.5–5)
PROSTATE SPECIFIC ANTIGEN: 0.53 NG/ML (ref 0–4)
SODIUM BLD-SCNC: 140 MMOL/L (ref 132–146)
TOTAL PROTEIN: 6.9 G/DL (ref 6.4–8.3)
TRIGL SERPL-MCNC: 60 MG/DL (ref 0–149)
VLDLC SERPL CALC-MCNC: 12 MG/DL

## 2020-12-18 PROCEDURE — 99396 PREV VISIT EST AGE 40-64: CPT | Performed by: FAMILY MEDICINE

## 2020-12-18 RX ORDER — ROSUVASTATIN CALCIUM 40 MG/1
40 TABLET, COATED ORAL DAILY
Qty: 90 TABLET | Refills: 1 | Status: SHIPPED
Start: 2020-12-18 | End: 2021-03-19 | Stop reason: SDUPTHER

## 2020-12-18 RX ORDER — LANOLIN ALCOHOL/MO/W.PET/CERES
6 CREAM (GRAM) TOPICAL DAILY
Qty: 60 TABLET | Refills: 3 | Status: CANCELLED | OUTPATIENT
Start: 2020-12-18

## 2020-12-18 RX ORDER — ASPIRIN/CALCIUM/MAG/ALUMINUM 325 MG
325 TABLET ORAL DAILY
Qty: 30 TABLET | Refills: 11 | Status: CANCELLED | OUTPATIENT
Start: 2020-12-18

## 2020-12-18 RX ORDER — TRAZODONE HYDROCHLORIDE 50 MG/1
50 TABLET ORAL NIGHTLY
Qty: 90 TABLET | Refills: 1 | Status: SHIPPED
Start: 2020-12-18 | End: 2021-03-19 | Stop reason: SDUPTHER

## 2020-12-18 RX ORDER — OMEPRAZOLE 20 MG/1
20 CAPSULE, DELAYED RELEASE ORAL DAILY
Qty: 90 CAPSULE | Refills: 1 | Status: SHIPPED
Start: 2020-12-18 | End: 2021-03-19 | Stop reason: SDUPTHER

## 2020-12-18 RX ORDER — ATENOLOL 50 MG/1
50 TABLET ORAL DAILY
Qty: 90 TABLET | Refills: 1 | Status: SHIPPED
Start: 2020-12-18 | End: 2021-03-19 | Stop reason: SDUPTHER

## 2020-12-18 NOTE — PROGRESS NOTES
Chief Complaint:   Beau Elias is a 61 y.o. male who presents for complete physical examination    History of Present Illness:    Atenolol for Afib. palps only today when going up stairs w/ slight lightheadedness, but sxs now resolved. Was exercising yesterday for prolonged period w/o any issue. Right now no CP, SOB, palpitations, blurred vision, HA, lightheadedness, LOC or focal neurological deficits. Working as . Enjoys being back in office more than working from home.      Health Maintenance Due   Topic Date Due    HIV screen  05/31/1975    Shingles Vaccine (1 of 2) 05/31/2010    Lipid screen  12/20/2020     Declined hiv screening      Pneumonia shot: n/a  Colon cancer screening: utd  A1C: N/A  Microalb: N/A  Fall risk: none  BP: nl  Smoker: no    Tetanus shot: utd  Flu shot: utd  Shingles shot: recommended  Gardasil: N/A    Last prostate cancer screening: ordered  Low dose CT Chest: N/A  AAA screen: N/A    The 10-year ASCVD risk score (Nnamdi Mosley, et al., 2013) is: 7.4%    Values used to calculate the score:      Age: 61 years      Sex: Male      Is Non- : No      Diabetic: No      Tobacco smoker: No      Systolic Blood Pressure: 285 mmHg      Is BP treated: No      HDL Cholesterol: 53 mg/dL      Total Cholesterol: 193 mg/dL   Taking aspirin: yes        Patient Active Problem List   Diagnosis    FH: colon cancer    GERD (gastroesophageal reflux disease)    Hypogonadism male    Hyperlipidemia    Paroxysmal atrial fibrillation ([address]    Diverticulosis    Tubular adenoma    History of colon polyps    Adenomatous polyp of colon    Anxiety    Sleep disturbance    Chronic left shoulder pain     Past Medical History:   Diagnosis Date    Anticoagulant long-term use     Anxiety     Atrial fibrillation (HCC)     GERD (gastroesophageal reflux disease)     Hyperlipidemia     Insomnia     Paroxysmal atrial fibrillation ([address]        Past Surgical History: None     Relationship status: None    Intimate partner violence     Fear of current or ex partner: None     Emotionally abused: None     Physically abused: None     Forced sexual activity: None   Other Topics Concern    None   Social History Narrative    None     Family History   Problem Relation Age of Onset    Hypertension Father     Cancer Father     High Blood Pressure Father     High Cholesterol Father     Obesity Father     Cancer Paternal Grandmother         68    Diabetes Mother         pre- daibetic    Stroke Paternal Grandfather     Cancer Sister                               Review Of Systems (bold are positive, all else are negative)  Gen: No fevers, sweats, chills   No fatigue   No weight unintentional weight changes  Skin:  No rash, no lesion  Eyes: No vision changes   No eye itching  ENT: No earache, no drainage   No nasal congestion   No sinus tenderness   No sore throat   No swallowing issues  Resp: No cough, shortness of breath, wheeze, chest congestion  CV: No chest pain, palpitation, edema, poor exercise tolerance  GI:  No abdominal pain   No nausea, no vomiting   No change in bowels, no diarrhea or constipation   No blood in stool or blood per rectum  : No dysuria, no hematuria, no frequency, no incontinence  MS: No back pain   No arthralgias   No myalgias   No gait issues  Endo: No polyuria, polydipsia, polyphagia   No temperature intolerance  Neuro: No headaches   No syncope/near syncope   No dizziness  Psych: No mood changes   No dysphoria   No anxiety   No sleep disturbance   No suicidal or homicidal ideation           PHYSICAL EXAMINATION:  /79 (Site: Left Upper Arm, Position: Sitting, Cuff Size: Medium Adult)   Pulse 75   Temp 97.2 °F (36.2 °C) (Temporal)   Ht 5' 11\" (1.803 m)   Wt 196 lb (88.9 kg)   SpO2 98%   BMI 27.34 kg/m²   General: Well nourished, well developed, no acute distress  Eyes:  PERRL, EOMI  ENT:  Nasal:  No mucosal edema     No nasal drainage   Oral:  mucosa moist and pink             no posterior pharyngeal drainage     no posterior pharyngeal exudate  Neck:  Supple   No palpable cervical lymphoadenopathy   Thyroid without mass or enlargement  Resp: Lungs CTAB   Equal and adequate air exchange without accessory muscle use   No rales, rhonchi or wheeze  CV: S1S2. Irregularly irregular per baseline   No murmur   Intact distal pulses  GI: Abdomen Soft, non tender, non distended   Normoactive bowel sounds   No palpable hepatosplenomegaly  MS: Physiologic ROM of all extremities    Intact distal pulses   No clubbing, cyanosis or edema  Skin Warm and dry; no rash or lesion  Neuro: Alert and oriented; motor and sensation intact       Lab Results   Component Value Date    WBC 6.8 05/14/2016    HGB 15.7 05/14/2016    HCT 46.6 05/14/2016     05/14/2016    CHOL 193 12/20/2019    TRIG 72 12/20/2019    HDL 53 12/20/2019    ALT 22 12/20/2019    AST 20 12/20/2019     12/20/2019    K 4.5 12/20/2019     12/20/2019    CREATININE 1.0 12/20/2019    BUN 15 12/20/2019    CO2 26 12/20/2019    TSH 1.790 05/14/2016    PSA 0.60 06/21/2019    LABA1C 6.1 06/18/2020          I have reviewed this patient's previous records. I have reviewed this patient's labs. I have reviewed this patient's medications. Georgina Tam was seen today for annual exam.    Diagnoses and all orders for this visit:    Annual physical exam    Paroxysmal atrial fibrillation  -     atenolol (TENORMIN) 50 MG tablet; Take 1 tablet by mouth daily  -     COMPREHENSIVE METABOLIC PANEL; Future  -     MAGNESIUM; Future    Insomnia, unspecified type  -     traZODone (DESYREL) 50 MG tablet; Take 1 tablet by mouth nightly    Sleep disturbance  -     traZODone (DESYREL) 50 MG tablet; Take 1 tablet by mouth nightly    Pure hypercholesterolemia  -     Lipid Panel; Future  -     rosuvastatin (CRESTOR) 40 MG tablet;  Take 1 tablet by mouth daily    Gastroesophageal reflux disease  - omeprazole (PRILOSEC) 20 MG delayed release capsule; Take 1 capsule by mouth daily    Screening for prostate cancer  -     PSA SCREENING; Future    Advised patient get a pulse oximeter and monitor his heart rate at home. Call me if his heart rate stays above 100. Patient agreed. Advised avoidance of liquids 2 hours before bed and double voiding and avoidance of caffeine to prevent waking up at 5 AM to urinate. Patient agreed. Return in about 6 months (around 6/18/2021).          Electronically signed by Len Hancock MD on 12/18/2020 at 8:48 AM

## 2020-12-18 NOTE — PATIENT INSTRUCTIONS
Decrease the aspirin to 81 mg. Please call every pharmacy around and ask if they provide the new shingles vaccine and how much it costs. If it's affordable please get it and let me know when you've received it.

## 2021-03-19 DIAGNOSIS — E78.00 PURE HYPERCHOLESTEROLEMIA: ICD-10-CM

## 2021-03-19 DIAGNOSIS — G47.9 SLEEP DISTURBANCE: ICD-10-CM

## 2021-03-19 DIAGNOSIS — K21.9 GASTROESOPHAGEAL REFLUX DISEASE, UNSPECIFIED WHETHER ESOPHAGITIS PRESENT: ICD-10-CM

## 2021-03-19 DIAGNOSIS — I48.0 PAROXYSMAL ATRIAL FIBRILLATION (HCC): ICD-10-CM

## 2021-03-19 DIAGNOSIS — G47.00 INSOMNIA, UNSPECIFIED TYPE: ICD-10-CM

## 2021-03-19 RX ORDER — ATENOLOL 50 MG/1
50 TABLET ORAL DAILY
Qty: 90 TABLET | Refills: 1 | Status: SHIPPED
Start: 2021-03-19 | End: 2021-09-14 | Stop reason: SDUPTHER

## 2021-03-19 RX ORDER — ROSUVASTATIN CALCIUM 40 MG/1
40 TABLET, COATED ORAL DAILY
Qty: 90 TABLET | Refills: 1 | Status: SHIPPED
Start: 2021-03-19 | End: 2021-09-14 | Stop reason: SDUPTHER

## 2021-03-19 RX ORDER — TRAZODONE HYDROCHLORIDE 50 MG/1
50 TABLET ORAL NIGHTLY
Qty: 90 TABLET | Refills: 1 | Status: SHIPPED
Start: 2021-03-19 | End: 2021-09-14 | Stop reason: SDUPTHER

## 2021-03-19 RX ORDER — OMEPRAZOLE 20 MG/1
20 CAPSULE, DELAYED RELEASE ORAL DAILY
Qty: 90 CAPSULE | Refills: 1 | Status: SHIPPED
Start: 2021-03-19 | End: 2021-09-14 | Stop reason: SDUPTHER

## 2021-06-18 ENCOUNTER — OFFICE VISIT (OUTPATIENT)
Dept: FAMILY MEDICINE CLINIC | Age: 61
End: 2021-06-18
Payer: COMMERCIAL

## 2021-06-18 VITALS
HEIGHT: 71 IN | WEIGHT: 195 LBS | DIASTOLIC BLOOD PRESSURE: 72 MMHG | HEART RATE: 94 BPM | BODY MASS INDEX: 27.3 KG/M2 | SYSTOLIC BLOOD PRESSURE: 116 MMHG | OXYGEN SATURATION: 96 % | RESPIRATION RATE: 18 BRPM

## 2021-06-18 DIAGNOSIS — Z80.0 FH: COLON CANCER: ICD-10-CM

## 2021-06-18 DIAGNOSIS — I48.0 PAROXYSMAL ATRIAL FIBRILLATION (HCC): ICD-10-CM

## 2021-06-18 DIAGNOSIS — K21.9 GASTROESOPHAGEAL REFLUX DISEASE, UNSPECIFIED WHETHER ESOPHAGITIS PRESENT: ICD-10-CM

## 2021-06-18 DIAGNOSIS — E78.00 PURE HYPERCHOLESTEROLEMIA: ICD-10-CM

## 2021-06-18 DIAGNOSIS — R73.9 HYPERGLYCEMIA: Primary | ICD-10-CM

## 2021-06-18 DIAGNOSIS — K62.5 BLOOD PER RECTUM: ICD-10-CM

## 2021-06-18 LAB — HBA1C MFR BLD: 6 %

## 2021-06-18 PROCEDURE — 83036 HEMOGLOBIN GLYCOSYLATED A1C: CPT | Performed by: FAMILY MEDICINE

## 2021-06-18 PROCEDURE — 99214 OFFICE O/P EST MOD 30 MIN: CPT | Performed by: FAMILY MEDICINE

## 2021-06-18 SDOH — ECONOMIC STABILITY: FOOD INSECURITY: WITHIN THE PAST 12 MONTHS, THE FOOD YOU BOUGHT JUST DIDN'T LAST AND YOU DIDN'T HAVE MONEY TO GET MORE.: NEVER TRUE

## 2021-06-18 SDOH — ECONOMIC STABILITY: FOOD INSECURITY: WITHIN THE PAST 12 MONTHS, YOU WORRIED THAT YOUR FOOD WOULD RUN OUT BEFORE YOU GOT MONEY TO BUY MORE.: NEVER TRUE

## 2021-06-18 ASSESSMENT — PATIENT HEALTH QUESTIONNAIRE - PHQ9
2. FEELING DOWN, DEPRESSED OR HOPELESS: 0
SUM OF ALL RESPONSES TO PHQ QUESTIONS 1-9: 0
SUM OF ALL RESPONSES TO PHQ9 QUESTIONS 1 & 2: 0
SUM OF ALL RESPONSES TO PHQ QUESTIONS 1-9: 0
1. LITTLE INTEREST OR PLEASURE IN DOING THINGS: 0
SUM OF ALL RESPONSES TO PHQ QUESTIONS 1-9: 0

## 2021-06-18 ASSESSMENT — SOCIAL DETERMINANTS OF HEALTH (SDOH): HOW HARD IS IT FOR YOU TO PAY FOR THE VERY BASICS LIKE FOOD, HOUSING, MEDICAL CARE, AND HEATING?: NOT HARD AT ALL

## 2021-06-18 NOTE — PROGRESS NOTES
FM Progress Note    Subjective: Bernardo Sampson is a 64y.o. year old male here for Afib. Health Maintenance Due   Topic Date Due    HIV screen  Never done    Shingles Vaccine (1 of 2) Never done     GERD well controlled with the omeprazole. No heartburn. Has been feeling gassiness lately. No diarrhea or n/v. Occasional pain in RLQ has improved with fodmap diet, but still abd distention. Appendix has been removed. Slight blood in the stool a couple weeks ago. Has had this before. Had hemorrhoid. Used to follow with general surgery Dr. Darien Brandon. Colon cancer in dad. Taking statin for dyslipidemia. Tolerating medicine well, no myalgias or GI upset. Lab Results   Component Value Date    CHOL 177 12/18/2020    CHOL 193 12/20/2019    CHOL 168 05/04/2017     Lab Results   Component Value Date    TRIG 60 12/18/2020    TRIG 72 12/20/2019    TRIG 87 05/04/2017     Lab Results   Component Value Date    HDL 51 12/18/2020    HDL 53 12/20/2019    HDL 50 05/04/2017     Lab Results   Component Value Date    LDLCALC 114 (H) 12/18/2020    LDLCALC 126 (H) 12/20/2019    LDLCALC 101 (H) 05/04/2017     The 10-year ASCVD risk score (Desi Diez et al., 2013) is: 7%    Values used to calculate the score:      Age: 64 years      Sex: Male      Is Non- : No      Diabetic: No      Tobacco smoker: No      Systolic Blood Pressure: 441 mmHg      Is BP treated: No      HDL Cholesterol: 51 mg/dL      Total Cholesterol: 177 mg/dL    For Afib on metoprolol and asa. No CP, SOB, palpitations, blurred vision, HA, lightheadedness, LOC or focal neurological deficits. Objective:  /72   Pulse 94   Resp 18   Ht 5' 11\" (1.803 m)   Wt 195 lb (88.5 kg)   SpO2 96%   BMI 27.20 kg/m²   General appearance: NAD, alert and interacting appropriately  HEENT: NCAT, PERRLA, EOMI   Resp: CTAB, no WRC  CVS: irregularly irregular, no MRG  Abdomen: BS +, SNDNT  Extremities: No clubbing, cyanosis, or edema. Warm. Dry. I have reviewed this patient's previous records. I have reviewed this patient's labs. I have reviewed this patient's medications. Assessment/Plan:    Lexie Boothe was seen today for hyperglycemia. Diagnoses and all orders for this visit:    Hyperglycemia  -     POCT glycosylated hemoglobin (Hb A1C)    Paroxysmal atrial fibrillation    Gastroesophageal reflux disease, unspecified whether esophagitis present    Pure hypercholesterolemia    FH: colon cancer  -     Modesta Strickland MD, General Surgery, [place]    Blood per rectum  -     Modesta Strickland MD, General Surgery, [place]    Cont omeprazole for GERD. CPM HLD  CPM PAF  Gas x advised for bloating    Patient Instructions   Please call every pharmacy around and ask if they provide the shingles vaccine and how much it costs. If it's affordable please get it and let me know when you've received it.           Return in about 6 months (around 12/18/2021) for annual physical.     Electronically signed by Valeria Sanders MD on 6/18/2021 at 8:42 AM

## 2021-06-25 ENCOUNTER — TELEPHONE (OUTPATIENT)
Dept: SURGERY | Age: 61
End: 2021-06-25

## 2021-06-25 NOTE — TELEPHONE ENCOUNTER
MA contacted patient to discuss rescheduling appointment for colonoscopy consult. Patient stated he would like to wait until he returns to schedule. MA informed patient referral would be deferred and we would call him in a few weeks to reschedule.      Electronically signed by Mirella Hobson on 6/25/21 at 1:43 PM EDT

## 2021-09-14 ENCOUNTER — TELEPHONE (OUTPATIENT)
Dept: FAMILY MEDICINE CLINIC | Age: 61
End: 2021-09-14

## 2021-09-14 DIAGNOSIS — G47.00 INSOMNIA, UNSPECIFIED TYPE: ICD-10-CM

## 2021-09-14 DIAGNOSIS — G47.9 SLEEP DISTURBANCE: ICD-10-CM

## 2021-09-14 DIAGNOSIS — K21.9 GASTROESOPHAGEAL REFLUX DISEASE, UNSPECIFIED WHETHER ESOPHAGITIS PRESENT: ICD-10-CM

## 2021-09-14 DIAGNOSIS — I48.0 PAROXYSMAL ATRIAL FIBRILLATION (HCC): ICD-10-CM

## 2021-09-14 DIAGNOSIS — E78.00 PURE HYPERCHOLESTEROLEMIA: ICD-10-CM

## 2021-09-14 RX ORDER — ROSUVASTATIN CALCIUM 40 MG/1
40 TABLET, COATED ORAL DAILY
Qty: 90 TABLET | Refills: 1 | Status: SHIPPED
Start: 2021-09-14 | End: 2022-03-09 | Stop reason: SDUPTHER

## 2021-09-14 RX ORDER — ATENOLOL 50 MG/1
50 TABLET ORAL DAILY
Qty: 90 TABLET | Refills: 1 | Status: SHIPPED
Start: 2021-09-14 | End: 2022-03-09 | Stop reason: SDUPTHER

## 2021-09-14 RX ORDER — OMEPRAZOLE 20 MG/1
20 CAPSULE, DELAYED RELEASE ORAL DAILY
Qty: 90 CAPSULE | Refills: 1 | Status: SHIPPED
Start: 2021-09-14 | End: 2022-03-09 | Stop reason: SDUPTHER

## 2021-09-14 RX ORDER — TRAZODONE HYDROCHLORIDE 50 MG/1
50 TABLET ORAL NIGHTLY
Qty: 90 TABLET | Refills: 1 | Status: SHIPPED
Start: 2021-09-14 | End: 2022-03-09 | Stop reason: SDUPTHER

## 2021-09-14 NOTE — TELEPHONE ENCOUNTER
traZODone (DESYREL) 50 MG tablet [8085]  traZODone (DESYREL) 50 MG tablet     atenolol (TENORMIN) 50 MG tablet    omeprazole (PRILOSEC) 20 MG delayed release capsule    rosuvastatin (CRESTOR) 40 MG tablet     Pt needs refill to [facility] [place]

## 2021-10-29 ENCOUNTER — OFFICE VISIT (OUTPATIENT)
Dept: FAMILY MEDICINE CLINIC | Age: 61
End: 2021-10-29
Payer: COMMERCIAL

## 2021-10-29 VITALS
BODY MASS INDEX: 25.73 KG/M2 | DIASTOLIC BLOOD PRESSURE: 95 MMHG | HEIGHT: 72 IN | RESPIRATION RATE: 20 BRPM | TEMPERATURE: 97.2 F | WEIGHT: 190 LBS | SYSTOLIC BLOOD PRESSURE: 157 MMHG | HEART RATE: 67 BPM

## 2021-10-29 DIAGNOSIS — R07.89 CHEST PRESSURE: ICD-10-CM

## 2021-10-29 DIAGNOSIS — M54.50 LOW BACK PAIN, UNSPECIFIED BACK PAIN LATERALITY, UNSPECIFIED CHRONICITY, UNSPECIFIED WHETHER SCIATICA PRESENT: Primary | ICD-10-CM

## 2021-10-29 DIAGNOSIS — I48.91 ATRIAL FIBRILLATION, UNSPECIFIED TYPE (HCC): ICD-10-CM

## 2021-10-29 DIAGNOSIS — R14.0 BLOATING: ICD-10-CM

## 2021-10-29 DIAGNOSIS — R94.31 ABNORMAL EKG: ICD-10-CM

## 2021-10-29 PROCEDURE — 99214 OFFICE O/P EST MOD 30 MIN: CPT | Performed by: FAMILY MEDICINE

## 2021-10-29 PROCEDURE — 93000 ELECTROCARDIOGRAM COMPLETE: CPT | Performed by: FAMILY MEDICINE

## 2021-10-29 NOTE — PROGRESS NOTES
FM Progress Note    Subjective:   A month ago stated having LBP on R. Not radiating. No trauma. Starting to feel better. Has been taking ibuprofen for it. HTN. Has been controlled in the past.     Chest pressure. Comes and goes, not related to exertion. Occurs once per week. No chest pain or sob with exertion walking a mile per day. Feeling some bloating. Seems to make back pain worse at times. Moving to [place] to be with son. Working from home now. Health Maintenance Due   Topic Date Due    COVID-19 Vaccine (1) Never done    HIV screen  Never done    Shingles Vaccine (1 of 2) Never done    Flu vaccine (1) 09/01/2021       Objective:   BP (!) 157/95   Pulse 67   Temp 97.2 °F (36.2 °C) (Temporal)   Resp 20   Ht 6' (1.829 m)   Wt 190 lb (86.2 kg)   BMI 25.77 kg/m²   General appearance: NAD, alert and interacting appropriately  HEENT: NCAT, PERRLA, EOMI   Resp: CTAB, no [place]  CVS: irregularly irregular, no MRG  Abdomen: BS +, SNDNT  Extremities: No clubbing, cyanosis, or edema. Warm. Dry. I have reviewed this patient's previous records. I have reviewed this patient's labs including normal TSH    I have reviewed this patient's imaging reports including previous EKGs showing Afib    Today's EKG showing VECs and poor R wave progression, new from last EKG    I have reviewed this patient's medications. Assessment/Plan:    Bryan Wiggins was seen today for back pain. Diagnoses and all orders for this visit:    Low back pain, unspecified back pain laterality, unspecified chronicity, unspecified whether sciatica present  -     diclofenac sodium (VOLTAREN) 1 % GEL; Apply 2 g topically 4 times daily    Chest pressure  -     EKG 12 Lead; Future  -     EKG 12 Lead  -     NM Cardiac Stress Test Nuclear Imaging; Future  -     Cardiac Stress Test Exercise- Treadmill;  Future    Atrial fibrillation, unspecified type (HCC)    Bloating    Abnormal EKG  -     NM Cardiac Stress Test Nuclear Imaging; Future  -     Cardiac Stress Test Exercise- Treadmill; Future    stop ibuprofen. Use topical instead. If BP still high at next OV then increase atenolol. If bloating not improved with stopping ibuprofen then increase omeprazole. DDx: Elevated blood pressure due to ibuprofen use versus other cardiovascular cause versus renal cause.   Chest pressure vague but possibly due to high blood pressure versus GERD vs less likely ACS            Electronically signed by Suzette Ferrell MD on 10/29/2021 at 12:23 PM

## 2021-11-09 ENCOUNTER — OFFICE VISIT (OUTPATIENT)
Dept: FAMILY MEDICINE CLINIC | Age: 61
End: 2021-11-09
Payer: COMMERCIAL

## 2021-11-09 VITALS
TEMPERATURE: 98.2 F | SYSTOLIC BLOOD PRESSURE: 123 MMHG | HEIGHT: 72 IN | RESPIRATION RATE: 18 BRPM | WEIGHT: 188 LBS | HEART RATE: 65 BPM | BODY MASS INDEX: 25.47 KG/M2 | DIASTOLIC BLOOD PRESSURE: 83 MMHG | OXYGEN SATURATION: 99 %

## 2021-11-09 DIAGNOSIS — R14.0 BLOATING: ICD-10-CM

## 2021-11-09 DIAGNOSIS — M54.50 LOW BACK PAIN, UNSPECIFIED BACK PAIN LATERALITY, UNSPECIFIED CHRONICITY, UNSPECIFIED WHETHER SCIATICA PRESENT: ICD-10-CM

## 2021-11-09 DIAGNOSIS — Z12.5 SCREENING FOR PROSTATE CANCER: Primary | ICD-10-CM

## 2021-11-09 DIAGNOSIS — I48.91 ATRIAL FIBRILLATION, UNSPECIFIED TYPE (HCC): ICD-10-CM

## 2021-11-09 DIAGNOSIS — R07.89 CHEST PRESSURE: ICD-10-CM

## 2021-11-09 PROCEDURE — 99214 OFFICE O/P EST MOD 30 MIN: CPT | Performed by: FAMILY MEDICINE

## 2021-11-09 NOTE — PROGRESS NOTES
FM Progress Note    Subjective:   Back pain. Better. Not taking anything    Bloating. Improved off ibuprofen. Chest discomfort. Still present. Not with exertion, but abnl EKG. Has not gotten stress test.     Elevated BP. Resolved off ibuprofen. Health Maintenance Due   Topic Date Due    COVID-19 Vaccine (1) Never done    Shingles Vaccine (1 of 2) Never done           Objective:   /83 (Site: Right Upper Arm, Position: Sitting, Cuff Size: Medium Adult)   Pulse 65   Temp 98.2 °F (36.8 °C) (Temporal)   Resp 18   Ht 6' (1.829 m)   Wt 188 lb (85.3 kg)   SpO2 99%   BMI 25.50 kg/m²   General appearance: NAD, alert and interacting appropriately  HEENT: NCAT, PERRLA, EOMI   Resp: CTAB, no WRC  CVS: RRR, no MRG  Abdomen: BS +, SNDNT  Extremities: No clubbing, cyanosis, or edema. Warm. Dry. I have reviewed this patient's previous records. I have reviewed this patient's labs. I have reviewed this patient's imaging reports. I have reviewed this patient's medications. Assessment/Plan:    Khushboo Hilton was seen today for annual exam.    Diagnoses and all orders for this visit:    Screening for prostate cancer  -     PSA SCREENING; Future    Atrial fibrillation, unspecified type ([address]  -     COMPREHENSIVE METABOLIC PANEL; Future  -     MAGNESIUM; Future    Chest pressure  -     COMPREHENSIVE METABOLIC PANEL; Future  -     MAGNESIUM; Future    Low back pain, unspecified back pain laterality, unspecified chronicity, unspecified whether sciatica present    Bloating          Patient Instructions   Please call every pharmacy around and ask if they provide the shingles vaccine and how much it costs. If it's affordable please get it and let me know when you've received it. Return in about 6 months (around 5/9/2022).         Electronically signed by Zeb Hernandez MD on 11/9/2021 at 8:53 AM

## 2021-11-11 ENCOUNTER — TELEPHONE (OUTPATIENT)
Dept: CARDIOLOGY | Age: 61
End: 2021-11-11

## 2021-11-12 ENCOUNTER — TELEPHONE (OUTPATIENT)
Dept: CARDIOLOGY | Age: 61
End: 2021-11-12

## 2021-11-12 NOTE — TELEPHONE ENCOUNTER
11-12-21 @ 7142. Called and spoke with the patient with a reminder regarding his stress test on 11-15-21 @ 2523. Reviewed instructions and Covid checklist. Instructed the patient to take all his medications as he normally would. The patient says he takes his [place] at night. Patient voiced understanding to all instructions.   Armando Carver RN  [facility]

## 2021-11-13 ENCOUNTER — HOSPITAL ENCOUNTER (OUTPATIENT)
Age: 61
Discharge: HOME | End: 2021-11-13
Payer: COMMERCIAL

## 2021-11-13 DIAGNOSIS — Z12.5 SCREENING FOR PROSTATE CANCER: ICD-10-CM

## 2021-11-13 DIAGNOSIS — I48.91 ATRIAL FIBRILLATION, UNSPECIFIED TYPE (HCC): ICD-10-CM

## 2021-11-13 DIAGNOSIS — R07.89 CHEST PRESSURE: ICD-10-CM

## 2021-11-13 LAB
ALBUMIN SERPL-MCNC: 4.2 G/DL (ref 3.5–5.2)
ALP BLD-CCNC: 66 U/L (ref 40–129)
ALT SERPL-CCNC: 16 U/L (ref 0–40)
ANION GAP SERPL CALCULATED.3IONS-SCNC: 7 MMOL/L (ref 7–16)
AST SERPL-CCNC: 13 U/L (ref 0–39)
BILIRUB SERPL-MCNC: 0.4 MG/DL (ref 0–1.2)
BUN BLDV-MCNC: 12 MG/DL (ref 6–23)
CALCIUM SERPL-MCNC: 8.7 MG/DL (ref 8.6–10.2)
CHLORIDE BLD-SCNC: 104 MMOL/L (ref 98–107)
CO2: 28 MMOL/L (ref 22–29)
CREAT SERPL-MCNC: 1 MG/DL (ref 0.7–1.2)
GFR AFRICAN AMERICAN: >60
GFR NON-AFRICAN AMERICAN: >60 ML/MIN/1.73
GLUCOSE BLD-MCNC: 115 MG/DL (ref 74–99)
MAGNESIUM: 2.1 MG/DL (ref 1.6–2.6)
POTASSIUM SERPL-SCNC: 4.9 MMOL/L (ref 3.5–5)
PROSTATE SPECIFIC ANTIGEN: 0.91 NG/ML (ref 0–4)
SODIUM BLD-SCNC: 139 MMOL/L (ref 132–146)
TOTAL PROTEIN: 6.1 G/DL (ref 6.4–8.3)

## 2021-11-13 PROCEDURE — 36415 COLL VENOUS BLD VENIPUNCTURE: CPT

## 2021-11-13 PROCEDURE — G0103 PSA SCREENING: HCPCS

## 2021-11-13 PROCEDURE — 80053 COMPREHEN METABOLIC PANEL: CPT

## 2021-11-13 PROCEDURE — 83735 ASSAY OF MAGNESIUM: CPT

## 2021-11-15 ENCOUNTER — HOSPITAL ENCOUNTER (OUTPATIENT)
Dept: CARDIOLOGY | Age: 61
Discharge: HOME | End: 2021-11-15
Payer: COMMERCIAL

## 2021-11-15 VITALS
WEIGHT: 188 LBS | RESPIRATION RATE: 16 BRPM | SYSTOLIC BLOOD PRESSURE: 138 MMHG | BODY MASS INDEX: 25.47 KG/M2 | HEART RATE: 77 BPM | DIASTOLIC BLOOD PRESSURE: 72 MMHG | HEIGHT: 72 IN

## 2021-11-15 DIAGNOSIS — R07.89 CHEST PRESSURE: ICD-10-CM

## 2021-11-15 DIAGNOSIS — R94.31 ABNORMAL EKG: ICD-10-CM

## 2021-11-15 LAB
LV EF: 75 %
LVEF MODALITY: NORMAL

## 2021-11-15 PROCEDURE — 3430000000 HC RX DIAGNOSTIC RADIOPHARMACEUTICAL: Performed by: INTERNAL MEDICINE

## 2021-11-15 PROCEDURE — A9500 TC99M SESTAMIBI: HCPCS | Performed by: INTERNAL MEDICINE

## 2021-11-15 PROCEDURE — 78452 HT MUSCLE IMAGE SPECT MULT: CPT

## 2021-11-15 PROCEDURE — 2580000003 HC RX 258: Performed by: INTERNAL MEDICINE

## 2021-11-15 PROCEDURE — 93017 CV STRESS TEST TRACING ONLY: CPT

## 2021-11-15 RX ORDER — ASPIRIN 81 MG/1
81 TABLET ORAL DAILY
COMMUNITY

## 2021-11-15 RX ORDER — SODIUM CHLORIDE 0.9 % (FLUSH) 0.9 %
10 SYRINGE (ML) INJECTION PRN
Status: DISCONTINUED | OUTPATIENT
Start: 2021-11-15 | End: 2021-11-16 | Stop reason: HOSPADM

## 2021-11-15 RX ADMIN — SODIUM CHLORIDE, PRESERVATIVE FREE 10 ML: 5 INJECTION INTRAVENOUS at 09:22

## 2021-11-15 RX ADMIN — Medication 10.4 MILLICURIE: at 07:47

## 2021-11-15 RX ADMIN — Medication 33.3 MILLICURIE: at 09:22

## 2021-11-15 RX ADMIN — SODIUM CHLORIDE, PRESERVATIVE FREE 10 ML: 5 INJECTION INTRAVENOUS at 07:47

## 2021-11-15 NOTE — PROCEDURES
[address] [facility] [address] [phone]                Exercise Stress Nuclear Gated SPECT Study    Name: Luis Eduardo Swanson [facility] Account Number: [de-identified]    :  1960      Sex: male              Date of Study:  11/15/2021    Height: 6' (182.9 cm)  Weight: 188 lb (85.3 kg)     Ordering Provider: Kyle Duran MD          PCP: Renee Alvarado MD      Cardiologist: none                        Interpreting Physician: Hiral Rosas MD  _________________________________________________________________________________    Indication:   Detecting the presence and location of coronary artery disease    Clinical History:   Patient has no known history of coronary artery disease. Resting ECG:    Atrial fibrillation 77 bpm, occasional PVCs, nonspecific ST-T wave changes    Exercise: The patient exercised using a Júnior protocol, completing 6:00 minutes and reaching an estimated work load of 7.0 metabolic equivalents (METS). Resting HR was 77. Peak exercise heart rate was 160 ( 101% of maximum predicted heart rate for age). Baseline /72. Peak exercise /82. The blood pressure response to exercise was normal      Exercise was terminated due to heart rate attained and dyspnea. The patient experienced no chest pain with exercise. Exercise ECG:   The patient demonstrated rare PVC during exercise. Occassional PVC's at rest.    With exercise, 1 mm downsloping ST depression in the inferior leads and 1 mm horizontal ST depression in the anterolateral leads. With exercise up to 0.5 mm mm of downsloping ST depression was noted in leads I, II, III, AVF,: And a 0.5 mm horizontal ST depression in V4, V5 and V6 with initial changes beginning at 5 minutes into exercise, at a heart rate of 138. These changes resolved shortly during recovery. The predictive value for ischemia was low .      Duke treadmill score was 6 implying low  risk. IMAGING: Myocardial perfusion imaging was performed at rest 30-35 minutes following the intravenous injection of 10.4 mCi of (Tc-Sestamibi) followed by 10 ml of Normal Saline. At peak exercise, the patient was injected intravenously with 33.3 mCi of (Tc-Sestamibi) followed by 10 ml of Normal Saline. Gated post-stress tomographic imaging was performed 20-25 minutes after stress. FINDINGS: The overall quality of the study was good. Left ventricular cavity size was noted to be normal.    Rotational analog analysis demonstrated soft tissue diaphragmatic attenuation. The gated SPECT stress imaging in the short, vertical long, and horizontal long axis demonstrated normal homogeneous tracer distribution throughout the myocardium. With left ventricular ejection fraction of 75% and no wall motion abnormalities. Impression:    1. Exercise EKG was negative. 2. The patient experienced no chest pain with exercise. 3. The myocardial perfusion imaging was normal.    4. Overall left ventricular systolic function was normal without regional wall motion abnormalities. 5. Shetty treadmill score was 6 implying low risk. 6. Exercise capacity was average. 7. Low risk general exercise treadmill test.    Thank you for sending your patient to this [employer].      Electronically signed by Archana Bolton MD on 11/15/21 at 5:23 PM EST

## 2022-03-09 ENCOUNTER — TELEPHONE (OUTPATIENT)
Dept: FAMILY MEDICINE CLINIC | Age: 62
End: 2022-03-09

## 2022-03-09 DIAGNOSIS — E78.00 PURE HYPERCHOLESTEROLEMIA: ICD-10-CM

## 2022-03-09 DIAGNOSIS — G47.00 INSOMNIA, UNSPECIFIED TYPE: ICD-10-CM

## 2022-03-09 DIAGNOSIS — G47.9 SLEEP DISTURBANCE: ICD-10-CM

## 2022-03-09 DIAGNOSIS — K21.9 GASTROESOPHAGEAL REFLUX DISEASE, UNSPECIFIED WHETHER ESOPHAGITIS PRESENT: ICD-10-CM

## 2022-03-09 DIAGNOSIS — I48.0 PAROXYSMAL ATRIAL FIBRILLATION (HCC): ICD-10-CM

## 2022-03-09 RX ORDER — ATENOLOL 50 MG/1
50 TABLET ORAL DAILY
Qty: 90 TABLET | Refills: 1 | Status: CANCELLED | OUTPATIENT
Start: 2022-03-09

## 2022-03-09 RX ORDER — TRAZODONE HYDROCHLORIDE 50 MG/1
50 TABLET ORAL NIGHTLY
Qty: 90 TABLET | Refills: 1 | Status: CANCELLED | OUTPATIENT
Start: 2022-03-09

## 2022-03-09 RX ORDER — ROSUVASTATIN CALCIUM 40 MG/1
40 TABLET, COATED ORAL DAILY
Qty: 90 TABLET | Refills: 1 | Status: CANCELLED | OUTPATIENT
Start: 2022-03-09

## 2022-03-09 RX ORDER — OMEPRAZOLE 20 MG/1
20 CAPSULE, DELAYED RELEASE ORAL DAILY
Qty: 90 CAPSULE | Refills: 1 | Status: SHIPPED
Start: 2022-03-09 | End: 2022-05-10 | Stop reason: SDUPTHER

## 2022-03-09 RX ORDER — ATENOLOL 50 MG/1
50 TABLET ORAL DAILY
Qty: 90 TABLET | Refills: 1 | Status: SHIPPED
Start: 2022-03-09 | End: 2022-08-29 | Stop reason: SDUPTHER

## 2022-03-09 RX ORDER — ROSUVASTATIN CALCIUM 40 MG/1
40 TABLET, COATED ORAL DAILY
Qty: 90 TABLET | Refills: 1 | Status: SHIPPED
Start: 2022-03-09 | End: 2022-08-29 | Stop reason: SDUPTHER

## 2022-03-09 RX ORDER — OMEPRAZOLE 20 MG/1
20 CAPSULE, DELAYED RELEASE ORAL DAILY
Qty: 90 CAPSULE | Refills: 1 | Status: CANCELLED | OUTPATIENT
Start: 2022-03-09

## 2022-03-09 RX ORDER — TRAZODONE HYDROCHLORIDE 50 MG/1
50 TABLET ORAL NIGHTLY
Qty: 90 TABLET | Refills: 1 | Status: SHIPPED
Start: 2022-03-09 | End: 2022-08-29 | Stop reason: SDUPTHER

## 2022-03-09 NOTE — TELEPHONE ENCOUNTER
Pt calling for refills on trazodone 50mg, omeprazole 20mg, atenolol 50mg, rosuvastin 40mg to [facility]

## 2022-05-10 ENCOUNTER — OFFICE VISIT (OUTPATIENT)
Dept: FAMILY MEDICINE CLINIC | Age: 62
End: 2022-05-10
Payer: COMMERCIAL

## 2022-05-10 VITALS
HEART RATE: 72 BPM | DIASTOLIC BLOOD PRESSURE: 84 MMHG | BODY MASS INDEX: 26.01 KG/M2 | HEIGHT: 72 IN | SYSTOLIC BLOOD PRESSURE: 124 MMHG | OXYGEN SATURATION: 98 % | TEMPERATURE: 96.8 F | WEIGHT: 192 LBS | RESPIRATION RATE: 18 BRPM

## 2022-05-10 DIAGNOSIS — G47.00 INSOMNIA, UNSPECIFIED TYPE: ICD-10-CM

## 2022-05-10 DIAGNOSIS — E78.00 PURE HYPERCHOLESTEROLEMIA: ICD-10-CM

## 2022-05-10 DIAGNOSIS — R73.01 IMPAIRED FASTING GLUCOSE: ICD-10-CM

## 2022-05-10 DIAGNOSIS — Z80.0 FH: COLON CANCER: ICD-10-CM

## 2022-05-10 DIAGNOSIS — R35.1 NOCTURIA: ICD-10-CM

## 2022-05-10 DIAGNOSIS — R14.0 BLOATING: ICD-10-CM

## 2022-05-10 DIAGNOSIS — K21.9 GASTROESOPHAGEAL REFLUX DISEASE, UNSPECIFIED WHETHER ESOPHAGITIS PRESENT: ICD-10-CM

## 2022-05-10 DIAGNOSIS — I48.0 PAROXYSMAL ATRIAL FIBRILLATION (HCC): Primary | ICD-10-CM

## 2022-05-10 DIAGNOSIS — Z12.11 SCREEN FOR COLON CANCER: ICD-10-CM

## 2022-05-10 LAB — HBA1C MFR BLD: 6 %

## 2022-05-10 PROCEDURE — 83036 HEMOGLOBIN GLYCOSYLATED A1C: CPT | Performed by: FAMILY MEDICINE

## 2022-05-10 PROCEDURE — 99214 OFFICE O/P EST MOD 30 MIN: CPT | Performed by: FAMILY MEDICINE

## 2022-05-10 RX ORDER — OMEPRAZOLE 40 MG/1
40 CAPSULE, DELAYED RELEASE ORAL DAILY
Qty: 90 CAPSULE | Refills: 1 | Status: SHIPPED
Start: 2022-05-10 | End: 2022-10-04 | Stop reason: SDUPTHER

## 2022-05-10 SDOH — SOCIAL STABILITY: SOCIAL INSECURITY: WITHIN THE LAST YEAR, HAVE YOU BEEN AFRAID OF YOUR PARTNER OR EX-PARTNER?: NO

## 2022-05-10 SDOH — SOCIAL STABILITY: SOCIAL NETWORK: HOW OFTEN DO YOU ATTENT MEETINGS OF THE CLUB OR ORGANIZATION YOU BELONG TO?: NEVER

## 2022-05-10 SDOH — SOCIAL STABILITY: SOCIAL NETWORK: IN A TYPICAL WEEK, HOW MANY TIMES DO YOU TALK ON THE PHONE WITH FAMILY, FRIENDS, OR NEIGHBORS?: ONCE A WEEK

## 2022-05-10 SDOH — ECONOMIC STABILITY: TRANSPORTATION INSECURITY
IN THE PAST 12 MONTHS, HAS LACK OF TRANSPORTATION KEPT YOU FROM MEETINGS, WORK, OR FROM GETTING THINGS NEEDED FOR DAILY LIVING?: NO

## 2022-05-10 SDOH — SOCIAL STABILITY: SOCIAL NETWORK: HOW OFTEN DO YOU ATTEND CHURCH OR RELIGIOUS SERVICES?: MORE THAN 4 TIMES PER YEAR

## 2022-05-10 SDOH — HEALTH STABILITY: PHYSICAL HEALTH: ON AVERAGE, HOW MANY MINUTES DO YOU ENGAGE IN EXERCISE AT THIS LEVEL?: 30 MIN

## 2022-05-10 SDOH — ECONOMIC STABILITY: HOUSING INSECURITY: IN THE LAST 12 MONTHS, HOW MANY PLACES HAVE YOU LIVED?: 1

## 2022-05-10 SDOH — ECONOMIC STABILITY: FOOD INSECURITY: WITHIN THE PAST 12 MONTHS, THE FOOD YOU BOUGHT JUST DIDN'T LAST AND YOU DIDN'T HAVE MONEY TO GET MORE.: NEVER TRUE

## 2022-05-10 SDOH — SOCIAL STABILITY: SOCIAL NETWORK: ARE YOU MARRIED, WIDOWED, DIVORCED, SEPARATED, NEVER MARRIED, OR LIVING WITH A PARTNER?: MARRIED

## 2022-05-10 SDOH — ECONOMIC STABILITY: TRANSPORTATION INSECURITY
IN THE PAST 12 MONTHS, HAS THE LACK OF TRANSPORTATION KEPT YOU FROM MEDICAL APPOINTMENTS OR FROM GETTING MEDICATIONS?: NO

## 2022-05-10 SDOH — ECONOMIC STABILITY: HOUSING INSECURITY
IN THE LAST 12 MONTHS, WAS THERE A TIME WHEN YOU DID NOT HAVE A STEADY PLACE TO SLEEP OR SLEPT IN A SHELTER (INCLUDING NOW)?: NO

## 2022-05-10 SDOH — HEALTH STABILITY: MENTAL HEALTH: HOW MANY STANDARD DRINKS CONTAINING ALCOHOL DO YOU HAVE ON A TYPICAL DAY?: 1 OR 2

## 2022-05-10 SDOH — ECONOMIC STABILITY: INCOME INSECURITY: HOW HARD IS IT FOR YOU TO PAY FOR THE VERY BASICS LIKE FOOD, HOUSING, MEDICAL CARE, AND HEATING?: NOT HARD AT ALL

## 2022-05-10 SDOH — HEALTH STABILITY: MENTAL HEALTH
STRESS IS WHEN SOMEONE FEELS TENSE, NERVOUS, ANXIOUS, OR CAN'T SLEEP AT NIGHT BECAUSE THEIR MIND IS TROUBLED. HOW STRESSED ARE YOU?: ONLY A LITTLE

## 2022-05-10 SDOH — SOCIAL STABILITY: SOCIAL NETWORK
DO YOU BELONG TO ANY CLUBS OR ORGANIZATIONS SUCH AS CHURCH GROUPS UNIONS, FRATERNAL OR ATHLETIC GROUPS, OR SCHOOL GROUPS?: NO

## 2022-05-10 SDOH — SOCIAL STABILITY: SOCIAL INSECURITY: WITHIN THE LAST YEAR, HAVE YOU BEEN HUMILIATED OR EMOTIONALLY ABUSED IN OTHER WAYS BY YOUR PARTNER OR EX-PARTNER?: NO

## 2022-05-10 SDOH — ECONOMIC STABILITY: INCOME INSECURITY: IN THE LAST 12 MONTHS, WAS THERE A TIME WHEN YOU WERE NOT ABLE TO PAY THE MORTGAGE OR RENT ON TIME?: NO

## 2022-05-10 SDOH — HEALTH STABILITY: MENTAL HEALTH: HOW OFTEN DO YOU HAVE A DRINK CONTAINING ALCOHOL?: 2-4 TIMES A MONTH

## 2022-05-10 SDOH — SOCIAL STABILITY: SOCIAL INSECURITY
WITHIN THE LAST YEAR, HAVE YOU BEEN KICKED, HIT, SLAPPED, OR OTHERWISE PHYSICALLY HURT BY YOUR PARTNER OR EX-PARTNER?: NO

## 2022-05-10 SDOH — ECONOMIC STABILITY: FOOD INSECURITY: WITHIN THE PAST 12 MONTHS, YOU WORRIED THAT YOUR FOOD WOULD RUN OUT BEFORE YOU GOT MONEY TO BUY MORE.: NEVER TRUE

## 2022-05-10 SDOH — SOCIAL STABILITY: SOCIAL INSECURITY
WITHIN THE LAST YEAR, HAVE TO BEEN RAPED OR FORCED TO HAVE ANY KIND OF SEXUAL ACTIVITY BY YOUR PARTNER OR EX-PARTNER?: NO

## 2022-05-10 SDOH — SOCIAL STABILITY: SOCIAL NETWORK: HOW OFTEN DO YOU GET TOGETHER WITH FRIENDS OR RELATIVES?: MORE THAN THREE TIMES A WEEK

## 2022-05-10 SDOH — HEALTH STABILITY: PHYSICAL HEALTH: ON AVERAGE, HOW MANY DAYS PER WEEK DO YOU ENGAGE IN MODERATE TO STRENUOUS EXERCISE (LIKE A BRISK WALK)?: 6 DAYS

## 2022-05-10 NOTE — PROGRESS NOTES
FM Progress Note    Subjective:   GERD. Omeprazole 40 helps. No further bloating. Symptoms returned soon after stopping omeprazole. Afib. Aspirin and atenolol. [id number] = 0. Occasional palps, worsened by stress of job. Neg stress test in November, reviewed today. Elevated BP. Resolved off ibuprofen. Dyslipidemia. Crestor 40. Insomnia. Trazodone helps. Some dysuria recently, resolved. Still gets up once at night to pee. Some hesitancy. PSA nl. Family history colon cancer. Dad  from it at 61. Saw Dr. Lacho Fajardo in the past, due for repeat colonoscopy. Hyperglycemia. 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 Job is stressful. Planning to retire in 3 years and move to [place], but needs to retire at 79 to get full benefits. Health Maintenance Due   Topic Date Due    COVID-19 Vaccine (1) Never done    Shingles vaccine (1 of 2) Never done    Lipids  2021    Colorectal Cancer Screen  2022           Objective:   /84 (Site: Left Upper Arm, Position: Sitting, Cuff Size: Large Adult)   Pulse 72   Temp 96.8 °F (36 °C) (Temporal)   Resp 18   Ht 6' (1.829 m)   Wt 192 lb (87.1 kg)   SpO2 98%   BMI 26.04 kg/m²   General appearance: NAD, alert and interacting appropriately  HEENT: NCAT, PERRLA, EOMI   Resp: CTAB, no WRC  CVS: irregularly irregular, no MRG  Abdomen: BS +, SNDNT  Extremities: No clubbing, cyanosis, or edema. Warm. Dry. I have reviewed this patient's previous records. I have reviewed this patient's labs. I have reviewed this patient's imaging reports. I have reviewed this patient's medications. Assessment/Plan:    Omar Talavera was seen today for atrial fibrillation.     Diagnoses and all orders for this visit:    Paroxysmal atrial fibrillation (HCC)    Gastroesophageal reflux disease, unspecified whether esophagitis present  -     omeprazole (PRILOSEC) 40 MG delayed release capsule; Take 1 capsule by mouth Daily    Bloating    FH: colon cancer  -     Telma Albarado MD, General Surgery, [place]    Screen for colon cancer  -     Telma Albarado MD, General Surgery, [place]    Nocturia    Impaired fasting glucose  -     POCT glycosylated hemoglobin (Hb A1C)    Pure hypercholesterolemia    Insomnia, unspecified type      Continue to monitor A. fib. Continue aspirin. Patient Instructions   Please call every pharmacy around and ask if they provide the shingles vaccine and how much it costs. If it's affordable please get it and let me know when you've received it. Continue present management hyperlipidemia. Continue present management insomnia.   Continue to monitor nocturia, keep a journal, consider urology referral.      Return in about 6 months (around 11/10/2022) for annual physical.        Electronically signed by Vince You MD on 5/10/2022 at 9:19 AM

## 2022-06-08 ENCOUNTER — OFFICE VISIT (OUTPATIENT)
Dept: SURGERY | Age: 62
End: 2022-06-08

## 2022-06-08 ENCOUNTER — TELEPHONE (OUTPATIENT)
Dept: SURGERY | Age: 62
End: 2022-06-08

## 2022-06-08 VITALS
DIASTOLIC BLOOD PRESSURE: 66 MMHG | SYSTOLIC BLOOD PRESSURE: 134 MMHG | HEIGHT: 72 IN | WEIGHT: 192 LBS | BODY MASS INDEX: 26.01 KG/M2 | HEART RATE: 102 BPM | RESPIRATION RATE: 16 BRPM | OXYGEN SATURATION: 99 %

## 2022-06-08 DIAGNOSIS — Z80.0 FAMILY HX OF COLON CANCER: ICD-10-CM

## 2022-06-08 DIAGNOSIS — Z86.010 HX OF ADENOMATOUS COLONIC POLYPS: Primary | ICD-10-CM

## 2022-06-08 PROCEDURE — S0285 CNSLT BEFORE SCREEN COLONOSC: HCPCS | Performed by: SURGERY

## 2022-06-08 ASSESSMENT — ENCOUNTER SYMPTOMS
BLOOD IN STOOL: 0
COLOR CHANGE: 0
COUGH: 0
ABDOMINAL DISTENTION: 0
EYES NEGATIVE: 1
VOMITING: 0
CHOKING: 0
ABDOMINAL PAIN: 0
CONSTIPATION: 0
ANAL BLEEDING: 0
WHEEZING: 0
BACK PAIN: 0
NAUSEA: 0
CHEST TIGHTNESS: 0
DIARRHEA: 0
SHORTNESS OF BREATH: 0

## 2022-06-08 NOTE — PATIENT INSTRUCTIONS
Call [phone] for any questions/concerns. Patient Information and Instructions for Colonoscopy         Definition of Colonoscopy   A colonoscopy is the visual exam of the rectum and colon (large intestine). The exam is done with a tool called a colonoscope. The colonoscope is a flexible tube with a tiny camera on the end. This instrument allows the doctor to view the inside of your rectum and colon. Sigmoidoscopy is a shorter scope that views only the last one third of the colon. Reasons for Colonoscopy   It is used to examine, diagnose, and treat problems in your large intestine. The procedure is most often done for the following reasons: To determine the cause of abdominal pain, rectal bleeding, or a change in bowel habits   To detect and treat colon cancer or colon polyps   To obtain tissue samples for testing   To stop intestinal bleeding   Monitor response to treatment if you have inflammatory bowel disease     Possible Complications   Complications are rare, but no procedure is completely free of risk. If you are planning to have a colonoscopy, your doctor will review a list of possible complications, which may include:   Bleeding   Reaction to the sedation causing drop in your blood pressure or problems breathing  Perforation or puncture of the bowel     Factors that may increase the risk of complications include:   Pre-existing heart or kidney condition   Treatment with certain medicines, including aspirin and other drugs with anticoagulant or blood-thinning properties   Prior abdominal surgery or radiation treatments   Active colitis , diverticulitis , or other acute bowel disease   Previous treatment with radiation therapy     Be sure to discuss these risks with your doctor before the procedure.      What to Expect   Prior to Procedure   Your doctor will likely do the following:   Physical exam   Health history   Review of medicines   Test your stool for hidden blood (called \"occult blood\") Your colon must be completely clean before the procedure. Any stool left in the intestine will block the view. This preparation may start several days before the procedure. Follow your doctor's instructions. Leading up to your procedure:   Talk to your doctor about your medicines. You may be asked to stop taking some medicines up to one week before the procedure, like:   Anti-inflammatory drugs (e.g., aspirin )   Blood thinners like clopidogrel (Plavix) or warfarin (Coumadin)   Iron supplements or vitamins containing iron   The day or days before your procedure, go on a clear liquid diet (clear broth, clear juice, clear jello) with no red coloring  Do not eat or drink anything after midnight. Wear comfortable clothing. If you have diabetes, ask your doctor if you need to adjust your diabetes medicine on the day prior to your procedure and the day of your procedure. Arrange for a ride home after the procedure. Anesthesia   You will receive intravenous sedation medicine for the procedure so you will not feel anything during the procedure. Description of the Procedure   You will lie on your left side with knees bent and drawn up toward your chest. The colonoscope will be slowly inserted through the rectum and into the bowel. The colonoscope will inject air into the colon. A small attached video camera will allow the doctor to view the colon's lining on a screen. The doctor will continue guiding the tool through the bowel and assess the lining. A tissue sample or polyps may be removed during the procedure. How Long Will It Take? Usually it takes about 30 to 45 minutes     Will It Hurt? Most people do not feel anything during the procedure and will not remember the procedure. After the procedure, gas pains and cramping are common. These pains should go away with the passing of gas. Post-procedure Care   If any tissue was removed: It will be sent to a lab to be examined.  It may take 1-2 weeks for results. The doctor will usually give an initial report after the scope is removed. Other tests may be recommended. A small amount of bleeding may occur during the first few days after the procedure. When you return home after the procedure, be sure to follow your doctor's instructions, which may include:   Resume medicines as instructed by your doctor. Resume normal diet, unless directed otherwise by your doctor. The sedative will make you drowsy. Avoid driving, operating machinery, or making important decisions for the rest of the day. Rest for the remainder of the day. After arriving home, contact your doctor if any of the following occurs:   Bleeding from your rectum, notify your doctor if you pass a teaspoonful of blood or more. Black, tarry stools   Severe abdominal pain   Hard, swollen abdomen   Signs of infection, including fever or chills   Inability to pass gas or stool   Coughing, shortness of breath, chest pain, severe nausea or vomiting     In case of an emergency, CALL 911 . GATORADE COLONOSCOPY PREPARATION     **No aspirin, aspirin by-products or plavix for 1 week prior to your colonscopy. No coumadin for 5 days or check with your physician who orders the coumadin. Please contact the physican that prescribed the asprin, plavix, and coumadin to see if this is acceptable. **    Purchase these over the counter laxatives:  1. GATORADE (64 ounces) of lemonade or other clear Gatorade (two 32 Oz. bottles)  2. DULCOLAX 5 mg tablets (four tablets)  3. MIRALAX BOTTLE 238 grams (over the counter only)    The DAY BEFORE your colonoscopy:   Drink only clear liquids. (Absolutely no solid food)    Examples of clear liquids: Water, clear fruit juices such as apple or white grape, chicken or beef bouillion, jello (no RED or PURPLE), clear Gatorade, popsicles (no RED or PURPLE), clear soft drinks, coffee without cream or sugar. NO MILK OR MILK PRODUCTS. NO ORANGE JUICE.  NO RED OR PURPLE JELLO OR JUICES. 3 PM: Take 2 DULCOLAX tablets    5 PM: Mix the entire bottle of MIRALAX into the 64 ounces of GATORADE. (Put half the bottle in each 32 ounce bottle). Shake the solution until fully dissolved. Drink an 8 ounce glass every 30 minutes until the solution is gone. 7 PM: Take the last 2 DULCOLAX tablets. DO NOT EAT OR DRINK ANYTHING AFTER MIDNIGHT     The DAY OF your colonoscopy: You may take any necessary medications with a sip of water. Bring along someone to take you home. REMEMBER: The preparation is very important. An adequate clean out allows for the best evaluation of your entire colon. During the prep, using baby wipes may ease some of your discomfort. You should NOT plan on working or driving the rest of the day due to sedation given at the procedure.

## 2022-06-08 NOTE — TELEPHONE ENCOUNTER
Scheduled pt for Colonoscopy 8/5/22 at 12pm. Pt needs to arrive at [address] at 11am. Patient confirmed date and time, address and directions given in office. Prep instructions given in office, patient understood.     Electronically signed by Oscar Soria MA on 6/8/22 at 12:19 PM EDT

## 2022-06-08 NOTE — PROGRESS NOTES
Subjective:      Patient ID: Giovanni Gonzalez is a 58 y.o. male. HPI  58 yr old male referred by Dr. Trina Pastrana for colonoscopy eval.  Pt;s last colonoscopy was 2017--found to have tubular adenoma. Pt denies abd pain, change in bowel habits, blood in stool, or unintentional weight loss. Pt reports family hx of colon cancer in his father. Past Medical History:   Diagnosis Date    Anticoagulant long-term use     Anxiety     Atrial fibrillation (HCC)     GERD (gastroesophageal reflux disease)     Hyperlipidemia     Insomnia     Paroxysmal atrial fibrillation ([address]        Past Surgical History:   Procedure Laterality Date    APPENDECTOMY      CATARACT REMOVAL  2006    bilateral    COLONOSCOPY  1/2010    COLONOSCOPY  04/14/2017    ENDOSCOPY, COLON, DIAGNOSTIC      EYE SURGERY  2006    bilateral cataract    TONSILLECTOMY      UPPER GASTROINTESTINAL ENDOSCOPY  2007    VASECTOMY         Current Outpatient Medications   Medication Sig Dispense Refill    omeprazole (PRILOSEC) 40 MG delayed release capsule Take 1 capsule by mouth Daily 90 capsule 1    traZODone (DESYREL) 50 MG tablet Take 1 tablet by mouth nightly 90 tablet 1    atenolol (TENORMIN) 50 MG tablet Take 1 tablet by mouth daily 90 tablet 1    rosuvastatin (CRESTOR) 40 MG tablet Take 1 tablet by mouth daily 90 tablet 1    aspirin 81 MG EC tablet Take 81 mg by mouth daily      diclofenac sodium (VOLTAREN) 1 % GEL Apply 2 g topically 4 times daily 150 g 1    melatonin 3 MG TABS tablet Take 2 tablets by mouth daily 60 tablet 3    Aspirin Buf,KwHin-AySpu-MvGto, (BUFFERED ASPIRIN) 325 MG TABS Take 325 mg by mouth daily. To check with dr turner: med pre op       No current facility-administered medications for this visit.        No Known Allergies    Family History   Problem Relation Age of Onset    Diabetes Mother         pre- daibetic    Hypertension Father     Cancer Father 61        colon    High Blood Pressure Father     High Cholesterol Father     Obesity Father     Cancer Sister     Cancer Paternal Grandmother         68    Stroke Paternal Grandfather        Social History     Socioeconomic History    Marital status:      Spouse name: Not on file    Number of children: Not on file    Years of education: Not on file    Highest education level: Not on file   Occupational History    Not on file   Tobacco Use    Smoking status: Never Smoker    Smokeless tobacco: Never Used   Vaping Use    Vaping Use: Never used   Substance and Sexual Activity    Alcohol use: No     Alcohol/week: 0.0 standard drinks     Comment: occasionally     Drug use: No    Sexual activity: Yes   Other Topics Concern    Not on file   Social History Narrative    Not on file     Social Determinants of Health     Financial Resource Strain: Low Risk     Difficulty of Paying Living Expenses: Not hard at all   Food Insecurity: No Food Insecurity    Worried About [address] in the Last Year: Never true    [facility] in the Last Year: Never true   Transportation Needs: No Transportation Needs    Lack of Transportation (Medical): No    Lack of Transportation (Non-Medical): No   Physical Activity: Sufficiently Active    Days of Exercise per Week: 6 days    Minutes of Exercise per Session: 30 min   Stress: No Stress Concern Present    Feeling of Stress : Only a little   Social Connections:  Moderately Integrated    Frequency of Communication with Friends and Family: Once a week    Frequency of Social Gatherings with Friends and Family: More than three times a week    Attends [employer] Services: More than 4 times per year    Active Member of [address] [place] [address] or Organizations: No    Attends Club or Organization Meetings: Never    Marital Status:    Intimate Partner Violence: Not At Risk    Fear of Current or Ex-Partner: No    Emotionally Abused: No    Physically Abused: No    Sexually Abused: No   Housing Stability: Low Risk     Unable to Pay for Housing in the Last Year: No    Number of Places Lived in the Last Year: 1    Unstable Housing in the Last Year: No     Review of Systems   Constitutional: Negative for activity change, appetite change, chills, fever and unexpected weight change. HENT: Negative. Eyes: Negative. Respiratory: Negative for cough, choking, chest tightness, shortness of breath and wheezing. Cardiovascular: Positive for palpitations. Negative for chest pain and leg swelling. Gastrointestinal: Negative for abdominal distention, abdominal pain, anal bleeding, blood in stool, constipation, diarrhea, nausea and vomiting. Endocrine: Negative for cold intolerance, heat intolerance, polydipsia and polyuria. Genitourinary: Negative for dysuria, frequency, hematuria, penile discharge, penile pain, penile swelling, scrotal swelling, testicular pain and urgency. Musculoskeletal: Negative for arthralgias, back pain, gait problem, joint swelling, myalgias, neck pain and neck stiffness. Skin: Negative for color change, pallor, rash and wound. Allergic/Immunologic: Negative for environmental allergies and food allergies. Neurological: Negative for dizziness, seizures, syncope, weakness, light-headedness, numbness and headaches. Hematological: Negative for adenopathy. Does not bruise/bleed easily. Psychiatric/Behavioral: Negative for agitation, confusion, decreased concentration, hallucinations, self-injury and suicidal ideas. The patient is not nervous/anxious and is not hyperactive. Objective:   Physical Exam  Constitutional:       General: He is not in acute distress. Appearance: Normal appearance. He is well-developed. He is not ill-appearing, toxic-appearing or diaphoretic. HENT:      Head: Normocephalic and atraumatic. Nose: Nose normal.      Mouth/Throat:      Mouth: Mucous membranes are moist.      Pharynx: Oropharynx is clear. Eyes:      General: No scleral icterus.         Right eye: No discharge. Left eye: No discharge. Extraocular Movements: Extraocular movements intact. Pupils: Pupils are equal, round, and reactive to light. Cardiovascular:      Rate and Rhythm: Normal rate. Rhythm irregular. Heart sounds: Normal heart sounds. No murmur heard. Pulmonary:      Effort: Pulmonary effort is normal. No respiratory distress. Breath sounds: Normal breath sounds. No stridor. No wheezing, rhonchi or rales. Abdominal:      General: Bowel sounds are normal. There is no distension. Palpations: Abdomen is soft. There is no mass. Tenderness: There is no abdominal tenderness. There is no guarding or rebound. Hernia: No hernia is present. Musculoskeletal:         General: Normal range of motion. Cervical back: Normal range of motion and neck supple. Skin:     General: Skin is warm and dry. Neurological:      General: No focal deficit present. Mental Status: He is alert and oriented to person, place, and time. Psychiatric:         Mood and Affect: Mood normal.         Behavior: Behavior normal.         Thought Content: Thought content normal.         Judgment: Judgment normal.     PCP notes personally reviewed    Assessment:      Hx of tubular adenoma  Family hx of colon cancer      Plan:      Recommend colonoscopy. The patient was explained the risks/benefits/alternatives/expected outcomes of the procedure. The patient was explained the risks of the procedure, including, but not limited to, the risk of reaction to the anesthesia medicine and the risk of perforation requiring further surgery. The patient was informed that they may require biopsy or polypectomy. These procedures may increase the risk of complication. All questions were answered. The patient verbalized understanding and agreed to proceed.           Salvador Rinaldi MD

## 2022-06-08 NOTE — TELEPHONE ENCOUNTER
Prior Authorization Form:      DEMOGRAPHICS:                     Patient Name:  Chaparrita Moncada  Patient :  1960            Insurance:  Payor: DimitriChristy Lavinia Pearce 150 / Plan: DimitriChristy Pearce 150 - [place] PPO / Product Type: *No Product type* /   Insurance ID Number:    Payor/Plan Subscr  Sex Relation Sub. Ins. ID Effective Group Num   1.  [facility] 1960 Male Self [id number] 10/1/21 [id number]                                    [id number]         DIAGNOSIS & PROCEDURE:                       Procedure/Operation: COLONOSCOPY           CPT Code: 68233    Diagnosis:  FAMILY HISTORY OF COLON CANCER    ICD10 Code: Z80.0    Location:  [address] [place] [address]    Surgeon:  DR. Yanet Lynn    SCHEDULING INFORMATION:                          Date: 22    Time: 12PM              Anesthesia:  MAC/TIVA                                                       Status:  Outpatient        Special Comments:  N/A       Electronically signed by Lucila Dakins, MA on 2022 at 1:15 PM

## 2022-06-08 NOTE — LETTER
[facility] [place] [facility]  [address]  Phone: [phone]  Fax: [phone]           Giacomo Byers MD      June 8, 2022     Patient: Mague Osorio   MR Number: [id number]   YOB: 1960   Date of Visit: 6/8/2022       Dear Dr. Krishna Moreno: Thank you for referring Aida Ball to me for evaluation/treatment. Below are the relevant portions of my assessment and plan of care. Hx of tubular adenoma  Family hx of colon cancer    Recommend colonoscopy. The patient was explained the risks/benefits/alternatives/expected outcomes of the procedure. The patient was explained the risks of the procedure, including, but not limited to, the risk of reaction to the anesthesia medicine and the risk of perforation requiring further surgery. The patient was informed that they may require biopsy or polypectomy. These procedures may increase the risk of complication. All questions were answered. The patient verbalized understanding and agreed to proceed. If you have questions, please do not hesitate to call me. I look forward to following Diane Bennett along with you.     Sincerely,        Giacomo Byers MD    CC providers:  Andressa Marie MD  [address] [place] [id number]  [facility] [id number]

## 2022-07-29 NOTE — PROGRESS NOTES
[address] PRE-ADMISSION TESTING   ENDOSCOPY/ COLONSCOPY INSTRUCTIONS  PAT- Phone Number: [phone]    ENDOSCOPY/ COLONSCOPY INSTRUCTIONS:     [x] Bowel Prep instructions reviewed. [x] Colonoscopy- The day prior: No solid foods. Clear liquids only. [x] Nothing by mouth after midnight. Including no gum, candy, mints, or water. [x] You may brush your teeth, gargle, but do NOT swallow water. [x] Do not wear makeup, lotions, powders, deodorant. [x] Arrange transportation with a responsible adult  to and from the hospital. If you do not have a responsible adult  to transport you, you will need to make arrangements with a medical transportation company. Arrange for someone to be with you for the remainder of the day and for 24 hours after your procedure due to having had anesthesia. -Who will be your  for transportation? __wife________________   -Who will be staying with you for 24 hrs after your procedure?____wife______________    PARKING INSTRUCTIONS:     [x] ARRIVAL TIME: 1100  [x] Enter into the [employer]. Two people may accompany you. Masks are required. [x] Parking Lot \"I\" is where you will park. It is located on the corner of [facility] and [facility]. The entrance is on [facility]. To enter, press the button and the gate will lift. A free token will be provided to exit the lot. EDUCATION INSTRUCTIONS:    [x] Bring a complete list of your medications, please write the last time you took the medicine, give this list to the nurse.  [] Take the following medications the morning of surgery with 1-2 ounces of water: none  [x] Stop herbal supplements and vitamins 5 days before your surgery. [] DO NOT take any diabetic medicine the morning of surgery. Follow instructions for insulin the day before surgery.   [] If you are diabetic and your blood sugar is low or you feel symptomatic, you may drink 1-2 ounces of apple juice or take a glucose tablet. The morning of your procedure, you may call the pre-op area if you have concerns about your blood sugar [phone]. [] Use your inhalers the morning of surgery. Bring your emergency inhaler with you day of surgery. [x] Follow physician instructions regarding any blood thinners you may be taking. WHAT TO EXPECT:    [x] The day of your procedure you will be greeted and checked in by the [employer].  In addition, you will be registered in the [facility] by a Patient Access Representative. Please bring your photo ID and insurance card. A nurse will greet you in accordance to the time you are needed in the pre-op area to prepare you for surgery. Please do not be discouraged if you are not greeted in the order you arrive as there are many variables that are involved in patient preparation. Your patience is greatly appreciated as you wait for your nurse. Please bring in items such as: books, magazines, newspapers, electronics, or any other items  to occupy your time in the waiting area. [x]  Delays may occur. Staff will make a sincere effort to keep you informed of delays. If any delays occur with your procedure, we apologize ahead of time for your inconvenience as we recognize the value of your time.

## 2022-08-04 NOTE — PROGRESS NOTES
Patient notified of time change for procedure scheduled on 8/5.   Scheduled at 11:30 am.  Arrival time 10:30 am.

## 2022-08-05 ENCOUNTER — ANESTHESIA EVENT (OUTPATIENT)
Dept: ENDOSCOPY | Age: 62
End: 2022-08-05
Payer: COMMERCIAL

## 2022-08-05 ENCOUNTER — ANESTHESIA (OUTPATIENT)
Dept: ENDOSCOPY | Age: 62
End: 2022-08-05
Payer: COMMERCIAL

## 2022-08-05 ENCOUNTER — HOSPITAL ENCOUNTER (OUTPATIENT)
Age: 62
Setting detail: OUTPATIENT SURGERY
Discharge: HOME | End: 2022-08-05
Attending: SURGERY | Admitting: SURGERY
Payer: COMMERCIAL

## 2022-08-05 VITALS
TEMPERATURE: 96.9 F | WEIGHT: 190 LBS | OXYGEN SATURATION: 96 % | SYSTOLIC BLOOD PRESSURE: 119 MMHG | HEIGHT: 72 IN | BODY MASS INDEX: 25.73 KG/M2 | HEART RATE: 80 BPM | DIASTOLIC BLOOD PRESSURE: 56 MMHG | RESPIRATION RATE: 18 BRPM

## 2022-08-05 DIAGNOSIS — Z80.0 FAMILY HISTORY OF COLON CANCER: ICD-10-CM

## 2022-08-05 PROCEDURE — 2709999900 HC NON-CHARGEABLE SUPPLY: Performed by: SURGERY

## 2022-08-05 PROCEDURE — 3700000001 HC ADD 15 MINUTES (ANESTHESIA): Performed by: SURGERY

## 2022-08-05 PROCEDURE — 3609010300 HC COLONOSCOPY W/BIOPSY SINGLE/MULTIPLE: Performed by: SURGERY

## 2022-08-05 PROCEDURE — 7100000010 HC PHASE II RECOVERY - FIRST 15 MIN: Performed by: SURGERY

## 2022-08-05 PROCEDURE — 3700000000 HC ANESTHESIA ATTENDED CARE: Performed by: SURGERY

## 2022-08-05 PROCEDURE — 88305 TISSUE EXAM BY PATHOLOGIST: CPT

## 2022-08-05 PROCEDURE — 7100000011 HC PHASE II RECOVERY - ADDTL 15 MIN: Performed by: SURGERY

## 2022-08-05 PROCEDURE — 6360000002 HC RX W HCPCS: Performed by: NURSE ANESTHETIST, CERTIFIED REGISTERED

## 2022-08-05 PROCEDURE — 2580000003 HC RX 258: Performed by: SURGERY

## 2022-08-05 PROCEDURE — 45380 COLONOSCOPY AND BIOPSY: CPT | Performed by: SURGERY

## 2022-08-05 RX ORDER — SODIUM CHLORIDE 0.9 % (FLUSH) 0.9 %
5-40 SYRINGE (ML) INJECTION PRN
Status: DISCONTINUED | OUTPATIENT
Start: 2022-08-05 | End: 2022-08-05 | Stop reason: HOSPADM

## 2022-08-05 RX ORDER — SODIUM CHLORIDE 9 MG/ML
INJECTION, SOLUTION INTRAVENOUS CONTINUOUS
Status: DISCONTINUED | OUTPATIENT
Start: 2022-08-05 | End: 2022-08-05 | Stop reason: HOSPADM

## 2022-08-05 RX ORDER — SODIUM CHLORIDE 9 MG/ML
25 INJECTION, SOLUTION INTRAVENOUS PRN
Status: DISCONTINUED | OUTPATIENT
Start: 2022-08-05 | End: 2022-08-05 | Stop reason: HOSPADM

## 2022-08-05 RX ORDER — PROPOFOL 10 MG/ML
INJECTION, EMULSION INTRAVENOUS PRN
Status: DISCONTINUED | OUTPATIENT
Start: 2022-08-05 | End: 2022-08-05 | Stop reason: SDUPTHER

## 2022-08-05 RX ORDER — SODIUM CHLORIDE 0.9 % (FLUSH) 0.9 %
5-40 SYRINGE (ML) INJECTION EVERY 12 HOURS SCHEDULED
Status: DISCONTINUED | OUTPATIENT
Start: 2022-08-05 | End: 2022-08-05 | Stop reason: HOSPADM

## 2022-08-05 RX ADMIN — SODIUM CHLORIDE: 9 INJECTION, SOLUTION INTRAVENOUS at 11:47

## 2022-08-05 RX ADMIN — PROPOFOL 220 MG: 10 INJECTION, EMULSION INTRAVENOUS at 11:55

## 2022-08-05 ASSESSMENT — PAIN - FUNCTIONAL ASSESSMENT: PAIN_FUNCTIONAL_ASSESSMENT: NONE - DENIES PAIN

## 2022-08-05 NOTE — OP NOTE
Operative Note      Patient: Nomi Rae  YOB: 1960  MRN: [id number]    Date of Procedure: 8/5/2022    Pre-Op Diagnosis: FAMILY HISTORY OF COLON CANER    Post-Op Diagnosis: Same and polyp at 60 cm; diverticula starting at 30 cm       Procedure(s):  COLONOSCOPY WITH BIOPSY    Surgeon(s):  Kyung Gaines MD    Assistant:   * No surgical staff found *    Anesthesia: Monitor Anesthesia Care    Estimated Blood Loss (mL): Minimal    Complications: None    Specimens:   ID Type Source Tests Collected by Time Destination   A : colon Biopsy 60 cm Tissue Colon SURGICAL PATHOLOGY Kyung Gaines MD 8/5/2022 1204        Implants:  * No implants in log *      Drains: * No LDAs found *    Findings: polyp at 60 cm; diverticula at 30 cm    Detailed Description of Procedure:   COLONOSCOPY PROCEDURE NOTE    PROCEDURE:  The patient was brought into the endoscopy suite and placed in the left lateral decubitus position. A digital rectal exam was performed after the initiation of LMAC anesthesia and failed to reveal any obstructing masses or lesions. A colonoscope was inserted into the patient's anus and passed through the rectum, sigmoid, descending, transverse, and ascending colon all the way to the level of the cecum. Visualization of the cecum was confirmed by visualization of the ileo-cecal valve and confluence of the tinea. The scope was then withdrawn the entire length of the colon. There were no masses, polyps, or lesions noted until reaching 60 cm where a polyp was seen and biopsied. At 30 cm multiple diverticula were seen. Upon reaching the anus, the scope was retroflexed. There were no significant hemorrhoids noted. The scope was straightened and withdrawn entirely. The patient tolerated the procedure well and there were no complications. Prep was aequate; repeat colonoscopy in 3 years pending pathology.       Kyung Gaines MD  8/5/2022      Electronically signed by Kyung Gaines MD on 8/5/2022 at 12:16 PM

## 2022-08-05 NOTE — H&P
Patient ID: Saida Ramon is a 58 y.o. male. HPI  58 yr old male referred by Dr. Yasemin Castro for colonoscopy eval.  Pt;s last colonoscopy was 2017--found to have tubular adenoma. Pt denies abd pain, change in bowel habits, blood in stool, or unintentional weight loss. Pt reports family hx of colon cancer in his father. Past Medical History        Past Medical History:   Diagnosis Date    Anticoagulant long-term use      Anxiety      Atrial fibrillation (HCC)      GERD (gastroesophageal reflux disease)      Hyperlipidemia      Insomnia      Paroxysmal atrial fibrillation [facility])              Past Surgical History         Past Surgical History:   Procedure Laterality Date    APPENDECTOMY        CATARACT REMOVAL   2006     bilateral    COLONOSCOPY   1/2010    COLONOSCOPY   04/14/2017    ENDOSCOPY, COLON, DIAGNOSTIC        EYE SURGERY   2006     bilateral cataract    TONSILLECTOMY        UPPER GASTROINTESTINAL ENDOSCOPY   2007    VASECTOMY                Current Facility-Administered Medications          Current Outpatient Medications   Medication Sig Dispense Refill    omeprazole (PRILOSEC) 40 MG delayed release capsule Take 1 capsule by mouth Daily 90 capsule 1    traZODone (DESYREL) 50 MG tablet Take 1 tablet by mouth nightly 90 tablet 1    atenolol (TENORMIN) 50 MG tablet Take 1 tablet by mouth daily 90 tablet 1    rosuvastatin (CRESTOR) 40 MG tablet Take 1 tablet by mouth daily 90 tablet 1    aspirin 81 MG EC tablet Take 81 mg by mouth daily        diclofenac sodium (VOLTAREN) 1 % GEL Apply 2 g topically 4 times daily 150 g 1    melatonin 3 MG TABS tablet Take 2 tablets by mouth daily 60 tablet 3    Aspirin Buf,ZnLeq-NdSut-ZqXfj, (BUFFERED ASPIRIN) 325 MG TABS Take 325 mg by mouth daily. To check with dr turner: med pre op          No current facility-administered medications for this visit.             No Known Allergies     Family History         Family History   Problem Relation Age of Onset    Diabetes Mother           pre- daibetic    Hypertension Father      Cancer Father 61         colon    High Blood Pressure Father      High Cholesterol Father      Obesity Father      Cancer Sister      Cancer Paternal Grandmother           68    Stroke Paternal Grandfather              Social History               Socioeconomic History    Marital status:        Spouse name: Not on file    Number of children: Not on file    Years of education: Not on file    Highest education level: Not on file   Occupational History    Not on file   Tobacco Use    Smoking status: Never Smoker    Smokeless tobacco: Never Used   Vaping Use    Vaping Use: Never used   Substance and Sexual Activity    Alcohol use: No       Alcohol/week: 0.0 standard drinks       Comment: occasionally    Drug use: No    Sexual activity: Yes   Other Topics Concern    Not on file   Social History Narrative    Not on file      Social Determinants of Health          Financial Resource Strain: Low Risk    Difficulty of Paying Living Expenses: Not hard at all   Food Insecurity: No Food Insecurity    Worried About Running Out of Food in the Last Year: Never true    [address] in the Last Year: Never true   Transportation Needs: No Transportation Needs    Lack of Transportation (Medical): No    Lack of Transportation (Non-Medical): No   Physical Activity: Sufficiently Active    Days of Exercise per Week: 6 days    Minutes of Exercise per Session: 30 min   Stress: No Stress Concern Present    Feeling of Stress : Only a little   Social Connections:  Moderately Integrated    Frequency of Communication with Friends and Family: Once a week    Frequency of Social Gatherings with Friends and Family: More than three times a week    Attends [employer] Services: More than 4 times per year    Active Member of [address] [place] [address] or Organizations: No    Attends Club or Organization Meetings: Never    Marital Status:    Intimate Partner Violence: Not At Risk    Fear of Current or Ex-Partner: No    Emotionally Abused: No    Physically Abused: No    Sexually Abused: No   Housing Stability: Low Risk    Unable to Pay for Housing in the Last Year: No    Number of Places Lived in the Last Year: 1    Unstable Housing in the Last Year: No         Review of Systems  Constitutional: Negative for activity change, appetite change, chills, fever and unexpected weight change. HENT: Negative. Eyes: Negative. Respiratory: Negative for cough, choking, chest tightness, shortness of breath and wheezing. Cardiovascular: Positive for palpitations. Negative for chest pain and leg swelling. Gastrointestinal: Negative for abdominal distention, abdominal pain, anal bleeding, blood in stool, constipation, diarrhea, nausea and vomiting. Endocrine: Negative for cold intolerance, heat intolerance, polydipsia and polyuria. Genitourinary: Negative for dysuria, frequency, hematuria, penile discharge, penile pain, penile swelling, scrotal swelling, testicular pain and urgency. Musculoskeletal: Negative for arthralgias, back pain, gait problem, joint swelling, myalgias, neck pain and neck stiffness. Skin: Negative for color change, pallor, rash and wound. Allergic/Immunologic: Negative for environmental allergies and food allergies. Neurological: Negative for dizziness, seizures, syncope, weakness, light-headedness, numbness and headaches. Hematological: Negative for adenopathy. Does not bruise/bleed easily. Psychiatric/Behavioral: Negative for agitation, confusion, decreased concentration, hallucinations, self-injury and suicidal ideas. The patient is not nervous/anxious and is not hyperactive. Objective:   Physical Exam  Constitutional:       General: He is not in acute distress. Appearance: Normal appearance. He is well-developed. He is not ill-appearing, toxic-appearing or diaphoretic. HENT:     Head: Normocephalic and atraumatic.       Nose: Nose normal.      Mouth/Throat:      Mouth: Nick Albarado MD      UPDATED 8/5/22  History and physical unchanged  For colonoscopy    Nick Albarado MD, [facility]  8/5/2022  10:53 AM

## 2022-08-05 NOTE — DISCHARGE INSTRUCTIONS
Call [phone] for any questions/concerns. Diverticula seen--maintain high fiber diet. Polyp seen and biopsied--letter will be sent with results. Repeat colonoscopy in 3 years. Resume medications as instructed by your home physician. Colonoscopy: What to Expect at [address]  After a colonoscopy, you'll stay at the clinic until you wake up. Then you can go home. But you'll need to arrange for a ride. Your doctor will tell you whenyou can eat and do your other usual activities. Your doctor will talk to you about when you'll need your next colonoscopy. Your doctor can help you decide how often you need to be checked. This will dependon the results of your test and your risk for colorectal cancer. After the test, you may be bloated or have gas pains. You may need to pass gas. If a biopsy was done or a polyp was removed, you may have streaks of blood in your stool (feces) for a few days. Problems such as heavy rectal bleeding may not occur until several weeks after the test. This isn't common. But it canhappen after polyps are removed. This care sheet gives you a general idea about how long it will take for you to recover. But each person recovers at a different pace. Follow the steps belowto get better as quickly as possible. How can you care for yourself at home? Activity    Rest when you feel tired. You can do your normal activities when it feels okay to do so. Diet    Follow your doctor's directions for eating. Unless your doctor has told you not to, drink plenty of fluids. This helps to replace the fluids that were lost during the colon prep. Do not drink alcohol. Medicines    Your doctor will tell you if and when you can restart your medicines. You will also be given instructions about taking any new medicines. If you take aspirin or some other blood thinner, ask your doctor if and when to start taking it again.  Make sure that you understand exactly what your doctor wants you to do. If polyps were removed or a biopsy was done during the test, your doctor may tell you not to take aspirin or other anti-inflammatory medicines for a few days. These include ibuprofen (Advil, Motrin) and naproxen (Aleve). Other instructions    For your safety, do not drive or operate machinery until the medicine wears off and you can think clearly. Your doctor may tell you not to drive or operate machinery until the day after your test.     Do not sign legal documents or make major decisions until the medicine wears off and you can think clearly. The anesthesia can make it hard for you to fully understand what you are agreeing to. Follow-up care is a key part of your treatment and safety. Be sure to make and go to all appointments, and call your doctor if you are having problems. It's also a good idea to know your test results and keep alist of the medicines you take. When should you call for help? Call 911 anytime you think you may need emergency care. For example, call if:    You passed out (lost consciousness). You pass maroon or bloody stools. You have trouble breathing. Call your doctor now or seek immediate medical care if:    You have pain that does not get better after you take pain medicine. You are sick to your stomach or cannot drink fluids. You have new or worse belly pain. You have blood in your stools. You have a fever. You cannot pass stools or gas. Watch closely for changes in your health, and be sure to contact your doctor ifyou have any problems. Where can you learn more? Go to [email or web address]"[email or web address]yaneli.Klarna. org and sign in to your [employer] account. Enter E264 in the [facility] box to learn more about \"Colonoscopy: What to Expect at Home. \"     If you do not have an account, please click on the \"Sign Up Now\" link.   Current as of: September 8, 2021               Content Version: 13.3  © 2366-0337 Healthwise, Incorporated. Care instructions adapted under license by [facility] ([facility]). If you have questions about a medical condition or this instruction, always ask your healthcare professional. [address] any warranty or liability for your use of this information. Colon Polyps: Care Instructions  Your Care Instructions     Colon polyps are growths in the colon or the rectum. The cause of most colon polyps is not known, and most people who get them do not have any problems. But a certain kind can turn into cancer. For this reason, regular testing for colon polyps is important for people as they get older. It is also important foranyone who has an increased risk for colon cancer. Polyps are usually found through routine colon cancer screening tests. Although most colon polyps are not cancerous, they are usually removed and then tested for cancer. Screening for colon cancer saves lives because the cancer canusually be cured if it is caught early. If you have a polyp that is the type that can turn into cancer, you may need more tests to examine your entire colon. The doctor will remove any otherpolyps that he or she finds, and you will be tested more often. Follow-up care is a key part of your treatment and safety. Be sure to make and go to all appointments, and call your doctor if you are having problems. It's also a good idea to know your test results and keep alist of the medicines you take. How can you care for yourself at home? Regular exams to look for colon polyps are the best way to prevent polyps from turning into colon cancer. These can include stool tests, sigmoidoscopy, colonoscopy, and CT colonography. Talk with your doctor about a testingschedule that is right for you. To prevent polyps  There is no home treatment that can prevent colon polyps. But these steps mayhelp lower your risk for cancer. Stay active. Being active can help you get to and stay at a healthy weight.  Try to exercise on most days of the week. Walking is a good choice. Eat well. Choose a variety of vegetables, fruits, legumes (such as peas and beans), fish, poultry, and whole grains. Do not smoke. If you need help quitting, talk to your doctor about stop-smoking programs and medicines. These can increase your chances of quitting for good. If you drink alcohol, limit how much you drink. Limit alcohol to 2 drinks a day for men and 1 drink a day for women. When should you call for help? Call your doctor now or seek immediate medical care if:    You have severe belly pain. Your stools are maroon or very bloody. Watch closely for changes in your health, and be sure to contact your doctor if:    You have a fever. You have nausea or vomiting. You have a change in bowel habits (new constipation or diarrhea). Your symptoms get worse or are not improving as expected. Where can you learn more? Go to https://[employer].COTA. org and sign in to your [employer] account. Enter 95 [id number] in the [employer] box to learn more about \"Colon Polyps: Care Instructions. \"     If you do not have an account, please click on the \"Sign Up Now\" link. Current as of: September 8, 2021               Content Version: 13.3  © 2006-2022 Healthwise, Incorporated. Care instructions adapted under license by [facility] ([facility]). If you have questions about a medical condition or this instruction, always ask your healthcare professional. [place] [address] any warranty or liability for your use of this information. Diverticulosis: Care Instructions  Your Care Instructions  In diverticulosis, pouches called diverticula form in the wall of the large intestine (colon). The pouches do not cause any pain or other symptoms. Most people who have diverticulosis do not know they have it. But the pouches sometimes bleed, and if they become infected, they can cause pain and othersymptoms.  When this happens, it is called diverticulitis. Diverticula form when pressure pushes the wall of the colon outward at certainweak points. A diet that is too low in fiber can cause diverticula. Follow-up care is a key part of your treatment and safety. Be sure to make and go to all appointments, and call your doctor if you are having problems. It's also a good idea to know your test results and keep alist of the medicines you take. How can you care for yourself at home? Include fruits, leafy green vegetables, beans, and whole grains in your diet each day. These foods are high in fiber. Take a fiber supplement, such as Citrucel or Metamucil, every day if needed. Read and follow all instructions on the label. Drink plenty of fluids. If you have kidney, heart, or liver disease and have to limit fluids, talk with your doctor before you increase the amount of fluids you drink. Get at least 30 minutes of exercise on most days of the week. Walking is a good choice. You also may want to do other activities, such as running, swimming, cycling, or playing tennis or team sports. Cut out foods that cause gas, pain, or other symptoms. When should you call for help? Call your doctor now or seek immediate medical care if:    You have belly pain. You pass maroon or very bloody stools. You have a fever. You have nausea and vomiting. You have unusual changes in your bowel movements or abdominal swelling. You have burning pain when you urinate. You have abnormal vaginal discharge. You have shoulder pain. You have cramping pain that does not get better when you have a bowel movement or pass gas. You pass gas or stool from your urethra while urinating. Watch closely for changes in your health, and be sure to contact your doctor ifyou have any problems. Where can you learn more? Go to https://chyaneli.[employer]. org and sign in to your [employer] account.  Enter D227 in the [facility] box to learn more about \"Diverticulosis: Care Instructions. \"     If you do not have an account, please click on the \"Sign Up Now\" link. Current as of: September 8, 2021               Content Version: 13.3  © 2006-2022 [employer]. Care instructions adapted under license by [facility] ([facility]). If you have questions about a medical condition or this instruction, always ask your healthcare professional. [address] any warranty or liability for your use of this information. High-Fiber Diet: Care Instructions  Overview     A high-fiber diet may help you relieve constipation and feel less bloated. Your doctor and dietitian will help you make a high-fiber eating plan based on your personal needs. The plan will include the things you like to eat. It willalso make sure that you get 25 to 35 grams of fiber a day. Before you make changes to the way you eat, be sure to talk with your doctor ordietitian. Follow-up care is a key part of your treatment and safety. Be sure to make and go to all appointments, and call your doctor if you are having problems. It's also a good idea to know your test results and keep alist of the medicines you take. How can you care for yourself at home? You can increase how much fiber you get if you eat more of certain foods. These foods include:  Whole-grain breads and cereals. Fruits, such as pears, apples, and peaches. Eat the skins and peels if you can. Vegetables, such as broccoli, cabbage, spinach, carrots, asparagus, and squash. Starchy vegetables. These include potatoes with skins, kidney beans, and lima beans. Take a fiber supplement every day if your doctor recommends it. Examples are Benefiber, Citrucel, FiberCon, and Metamucil. Ask your doctor how much to take. Drink plenty of fluids. If you have kidney, heart, or liver disease and have to limit fluids, talk with your doctor before you increase the amount of fluids you drink.   Where can you learn more? Go to https://chpepiceweb.health[employer]heng[employer]. org and sign in to your [employer] account. Enter S514 in the [employer] box to learn more about \"High-Fiber Diet: Care Instructions. \"     If you do not have an account, please click on the \"Sign Up Now\" link. Current as of: September 8, 2021               Content Version: 13.3  © 3302-4162 Healthwise, Incorporated. Care instructions adapted under license by [facility] ([facility]). If you have questions about a medical condition or this instruction, always ask your healthcare professional. [address] any warranty or liability for your use of this information.

## 2022-08-05 NOTE — ANESTHESIA PRE PROCEDURE
Department of Anesthesiology  Preprocedure Note       Name:  Magdi Crump   Age:  58 y.o.  :  1960                                          MRN:  [id number]         Date:  2022      Surgeon: Antonia Holt):  Rosalio Burnette MD    Procedure: Procedure(s):  COLORECTAL CANCER SCREENING, HIGH RISK    Medications prior to admission:   Prior to Admission medications    Medication Sig Start Date End Date Taking?  Authorizing Provider   omeprazole (PRILOSEC) 40 MG delayed release capsule Take 1 capsule by mouth Daily  Patient taking differently: Take 40 mg by mouth nightly 5/10/22   Nicolette Garcia MD   traZODone (DESYREL) 50 MG tablet Take 1 tablet by mouth nightly 3/9/22   Nicolette Garcia MD   atenolol (TENORMIN) 50 MG tablet Take 1 tablet by mouth daily  Patient taking differently: Take 50 mg by mouth at bedtime 3/9/22   Nicolette Garcia MD   rosuvastatin (CRESTOR) 40 MG tablet Take 1 tablet by mouth daily  Patient taking differently: Take 40 mg by mouth at bedtime 3/9/22   Nicolette Garcia MD   aspirin 81 MG EC tablet Take 81 mg by mouth daily    Historical Provider, MD   diclofenac sodium (VOLTAREN) 1 % GEL Apply 2 g topically 4 times daily 10/29/21   Nicolette Garcia MD   melatonin 3 MG TABS tablet Take 2 tablets by mouth daily 18   Nicolette Garcia MD       Current medications:    Current Facility-Administered Medications   Medication Dose Route Frequency Provider Last Rate Last Admin    0.9 % sodium chloride infusion   IntraVENous Continuous Rosalio Burnette MD        sodium chloride flush 0.9 % injection 5-40 mL  5-40 mL IntraVENous 2 times per day Rosalio Burnette MD        sodium chloride flush 0.9 % injection 5-40 mL  5-40 mL IntraVENous PRN Rosalio Burnette MD        0.9 % sodium chloride infusion  25 mL IntraVENous PRN Rosalio Burnette MD           Allergies:  No Known Allergies    Problem List:    Patient Active Problem List   Diagnosis Code    FH: colon cancer Z80.0    GERD (gastroesophageal reflux disease) K21.9    Hypogonadism male E29.1    Hyperlipidemia E78.5    Paroxysmal atrial fibrillation (HCC) I48.0    Diverticulosis K57.90    Tubular adenoma D36.9    History of colon polyps Z86.010    Adenomatous polyp of colon D12.6    Anxiety F41.9    Sleep disturbance G47.9    Chronic left shoulder pain M25.512, G89.29       Past Medical History:        Diagnosis Date    Anticoagulant long-term use     Anxiety     Atrial fibrillation (HCC)     GERD (gastroesophageal reflux disease)     Hyperlipidemia     Insomnia     Paroxysmal atrial fibrillation ([address]        Past Surgical History:        Procedure Laterality Date    APPENDECTOMY      CATARACT REMOVAL  2006    bilateral    COLONOSCOPY  1/2010    COLONOSCOPY  04/14/2017    ENDOSCOPY, COLON, DIAGNOSTIC      EYE SURGERY  2006    bilateral cataract    TONSILLECTOMY      UPPER GASTROINTESTINAL ENDOSCOPY  2007    VASECTOMY         Social History:    Social History     Tobacco Use    Smoking status: Never    Smokeless tobacco: Never   Substance Use Topics    Alcohol use: No     Alcohol/week: 0.0 standard drinks     Comment: occasionally                                 Counseling given: Not Answered      Vital Signs (Current):   Vitals:    07/29/22 1031 08/05/22 1045   BP:  118/77   Pulse:  84   Resp:  20   Temp:  36.3 °C (97.3 °F)   TempSrc:  Temporal   SpO2:  97%   Weight: 190 lb (86.2 kg) 190 lb (86.2 kg)   Height:  6' (1.829 m)                                              BP Readings from Last 3 Encounters:   08/05/22 118/77   06/08/22 134/66   05/10/22 124/84       NPO Status: Time of last liquid consumption: 2200                        Time of last solid consumption: 2200                        Date of last liquid consumption: 08/04/22                        Date of last solid food consumption: 08/03/22    BMI:   Wt Readings from Last 3 Encounters:   08/05/22 190 lb (86.2 kg)   06/08/22 192 lb (87.1 kg) GI/Hepatic/Renal:   (+) GERD:, bowel prep,          ROS comment: Tubular adenoma  History of colon polyps    . Endo/Other: Negative Endo/Other ROS                    Abdominal:       Abdomen: soft. Vascular: negative vascular ROS. Other Findings:           Anesthesia Plan      MAC     ASA 3             Anesthetic plan and risks discussed with patient. Plan discussed with attending. [facility], [place]   8/5/2022      Patient seen and examined, chart reviewed, agree with above findings. Anesthetic plan, risks, benefits, alternatives, and personnel involved discussed with patient. Patient verbalized an understanding and agreed to proceed. NPO status confirmed. Anesthetic plan discussed with care team members and agreed upon.     Jhonny Dunham DO   8/5/2022  12:05 PM

## 2022-08-05 NOTE — PROGRESS NOTES
Pt brought to Secondary recovery from ENDO. Bed in lowest position and locked, side rails up, and call light given to pt. Pt passing flatus. Sitting up in bed. Tolerating PO. Wife called to bedside.

## 2022-08-05 NOTE — ANESTHESIA POSTPROCEDURE EVALUATION
Department of Anesthesiology  Postprocedure Note    Patient: Brian Wasserman  MRN: [id number]  YOB: 1960  Date of evaluation: 8/5/2022      Procedure Summary     Date: 08/05/22 Room / Location: [address] [place] [address] / [facility]    Anesthesia Start: 9372 Anesthesia Stop: 1215    Procedure: COLONOSCOPY WITH BIOPSY Diagnosis:       Family history of colon cancer      (FAMILY HISTORY OF COLON CANER)    Surgeons: Tawanna Hong MD Responsible Provider: Hortencia Monson DO    Anesthesia Type: MAC ASA Status: 3          Anesthesia Type: No value filed.     Rito Phase I: Rito Score: 10    Rito Phase II: Rito Score: 10      Anesthesia Post Evaluation    Patient location during evaluation: PACU  Patient participation: complete - patient participated  Level of consciousness: awake and alert  Pain score: 1  Airway patency: patent  Nausea & Vomiting: no nausea and no vomiting  Complications: no  Cardiovascular status: hemodynamically stable  Respiratory status: acceptable  Hydration status: euvolemic

## 2022-08-29 DIAGNOSIS — I48.0 PAROXYSMAL ATRIAL FIBRILLATION (HCC): Primary | ICD-10-CM

## 2022-08-29 DIAGNOSIS — I48.0 PAROXYSMAL ATRIAL FIBRILLATION (HCC): ICD-10-CM

## 2022-08-29 DIAGNOSIS — E78.00 PURE HYPERCHOLESTEROLEMIA: ICD-10-CM

## 2022-08-29 DIAGNOSIS — G47.00 INSOMNIA, UNSPECIFIED TYPE: ICD-10-CM

## 2022-08-29 DIAGNOSIS — G47.9 SLEEP DISTURBANCE: ICD-10-CM

## 2022-08-29 RX ORDER — ROSUVASTATIN CALCIUM 40 MG/1
40 TABLET, COATED ORAL DAILY
Qty: 90 TABLET | Refills: 1 | Status: SHIPPED | OUTPATIENT
Start: 2022-08-29

## 2022-08-29 RX ORDER — TRAZODONE HYDROCHLORIDE 50 MG/1
50 TABLET ORAL NIGHTLY
Qty: 90 TABLET | Refills: 1 | Status: SHIPPED | OUTPATIENT
Start: 2022-08-29

## 2022-08-29 RX ORDER — ATENOLOL 50 MG/1
50 TABLET ORAL NIGHTLY
Qty: 90 TABLET | Refills: 1 | Status: SHIPPED
Start: 2022-08-29 | End: 2022-10-21 | Stop reason: ALTCHOICE

## 2022-08-29 NOTE — TELEPHONE ENCOUNTER
Pt calling in for refills on Atenolol 50mg, rosuvastatin 40mg, omemprazole 40mg and trazadone 50mg to [facility] in [place]. He would also like a referral to cardiologist Dr Jeri Huffman through [id number] [facility].

## 2022-09-28 ENCOUNTER — OFFICE VISIT (OUTPATIENT)
Dept: PRIMARY CARE CLINIC | Age: 62
End: 2022-09-28
Payer: COMMERCIAL

## 2022-09-28 VITALS
SYSTOLIC BLOOD PRESSURE: 142 MMHG | RESPIRATION RATE: 16 BRPM | WEIGHT: 187.6 LBS | BODY MASS INDEX: 25.41 KG/M2 | HEIGHT: 72 IN | HEART RATE: 63 BPM | OXYGEN SATURATION: 98 % | DIASTOLIC BLOOD PRESSURE: 72 MMHG | TEMPERATURE: 97.1 F

## 2022-09-28 DIAGNOSIS — M75.42 IMPINGEMENT SYNDROME OF LEFT SHOULDER: Primary | ICD-10-CM

## 2022-09-28 PROCEDURE — 99213 OFFICE O/P EST LOW 20 MIN: CPT | Performed by: NURSE PRACTITIONER

## 2022-09-28 RX ORDER — PREDNISONE 10 MG/1
10 TABLET ORAL 2 TIMES DAILY
Qty: 10 TABLET | Refills: 0 | Status: SHIPPED | OUTPATIENT
Start: 2022-09-28 | End: 2022-10-03

## 2022-09-28 NOTE — PROGRESS NOTES
Chief Complaint:  Shoulder Pain (Left should pain for the past week and Pain is achy )      History of Present Illness:  Source of history provided by:  patient. Zoë Wooten is a 58 y.o. old male presenting to the [facility] care with left shoulder pain which occurred 5-7 days ago. There has been a history of no injury prior to the shoulder pain beginning. The pain is aggravated by movement. Pt states the pain in the shoulder does radiate down the arm to the fingers  Denies any neck pain or injury, HA,  hand weakness, or associated CP. Review of Systems:  Unless otherwise stated in this report or unable to obtain because of the patient's clinical or mental status as evidenced by the medical record, this patients's positive and negative responses for Review of Systems, constitutional, psych, eyes, ENT, cardiovascular, respiratory, gastrointestinal, neurological, genitourinary, musculoskeletal, integument systems and systems related to the presenting problem are either stated in the preceding or were not pertinent or were negative for the symptoms and/or complaints related to the medical problem. Past Medical History:  has a past medical history of Anticoagulant long-term use, Anxiety, Atrial fibrillation ([address] GERD (gastroesophageal reflux disease), Hyperlipidemia, Insomnia, and Paroxysmal atrial fibrillation ([address] Past Surgical History:  has a past surgical history that includes Cataract removal (2006); Tonsillectomy; Upper gastrointestinal endoscopy (2007); Colonoscopy (01/2010); Endoscopy, colon, diagnostic; eye surgery (2006); Appendectomy; Vasectomy; Colonoscopy (04/14/2017); Colonoscopy w/ biopsies (08/05/2022); and Colonoscopy (N/A, 8/5/2022). Social History:  reports that he has never smoked. He has never used smokeless tobacco. He reports that he does not drink alcohol and does not use drugs.   Family History: family history includes Cancer in his paternal grandmother and sister; Cancer (age of onset: 61) in his father; Diabetes in his mother; High Blood Pressure in his father; High Cholesterol in his father; Hypertension in his father; Obesity in his father; Stroke in his paternal grandfather. Allergies: Patient has no known allergies. Physical Exam:  (Vital signs reviewed) BP (!) 164/90   Pulse 63   Temp 97.1 °F (36.2 °C)   Resp 16   Ht 6' (1.829 m)   Wt 187 lb 9.6 oz (85.1 kg)   SpO2 98%   BMI 25.44 kg/m²   Oxygen Saturation Interpretation: Normal.  Constitutional:  Alert, development consistent with age. Neck:  Normal ROM. Supple. Non-tender. HEENT: Head NC/NT. External ears normal.  Eyes PERRL. Throat without erythema. Chest: Heart RRR without pathological murmur or gallop. Lungs CTA A and P without W/R/R. Shoulder:              Tenderness: Generalized tenderness present to left shoulder             Swelling: No obvious swelling noted. Deformity: No obvious deformity. ROM: Limited ROM due to pain. Skin:  No abrasions, bruising, or erythema noted. Neurovascular:              Sensory deficit: Sensation intact proximally and distally to the injury site. Capillary refill: Less then 2 sec throughout. Neurological: Alert and oriented. Motor functions intact.      -------------------------------------------Test Results Section---------------------------------------------  (All laboratory and radiology results have been personally reviewed by myself)    Radiology: All Radiology results interpreted by Radiologist unless otherwise noted. ------------------------------------Impression & Disposition Section------------------------------------  Impression:  1. Impingement syndrome of left shoulder        Disposition:  Disposition: Xray today, start Prednisone as ordered. Further treatment plan will be based on xray once received.  F/u w/PCP if no improvement or any worsening as discussed

## 2022-10-04 ENCOUNTER — OFFICE VISIT (OUTPATIENT)
Dept: FAMILY MEDICINE CLINIC | Age: 62
End: 2022-10-04
Payer: COMMERCIAL

## 2022-10-04 VITALS
SYSTOLIC BLOOD PRESSURE: 154 MMHG | DIASTOLIC BLOOD PRESSURE: 94 MMHG | BODY MASS INDEX: 24.92 KG/M2 | HEIGHT: 72 IN | RESPIRATION RATE: 16 BRPM | WEIGHT: 184 LBS | HEART RATE: 96 BPM | TEMPERATURE: 98.2 F | OXYGEN SATURATION: 96 %

## 2022-10-04 DIAGNOSIS — Z23 NEED FOR INFLUENZA VACCINATION: ICD-10-CM

## 2022-10-04 DIAGNOSIS — R03.0 ELEVATED BP WITHOUT DIAGNOSIS OF HYPERTENSION: ICD-10-CM

## 2022-10-04 DIAGNOSIS — M54.12 CERVICAL RADICULOPATHY: ICD-10-CM

## 2022-10-04 DIAGNOSIS — G62.9 NEUROPATHY: Primary | ICD-10-CM

## 2022-10-04 DIAGNOSIS — K21.9 GASTROESOPHAGEAL REFLUX DISEASE, UNSPECIFIED WHETHER ESOPHAGITIS PRESENT: ICD-10-CM

## 2022-10-04 DIAGNOSIS — I48.0 PAROXYSMAL ATRIAL FIBRILLATION (HCC): ICD-10-CM

## 2022-10-04 PROCEDURE — 99214 OFFICE O/P EST MOD 30 MIN: CPT | Performed by: FAMILY MEDICINE

## 2022-10-04 RX ORDER — MELOXICAM 15 MG/1
15 TABLET ORAL DAILY
Qty: 14 TABLET | Refills: 0 | Status: SHIPPED
Start: 2022-10-04 | End: 2022-10-18 | Stop reason: SDUPTHER

## 2022-10-04 RX ORDER — OMEPRAZOLE 40 MG/1
40 CAPSULE, DELAYED RELEASE ORAL DAILY
Qty: 90 CAPSULE | Refills: 1 | Status: SHIPPED | OUTPATIENT
Start: 2022-10-04

## 2022-10-04 ASSESSMENT — PATIENT HEALTH QUESTIONNAIRE - PHQ9
SUM OF ALL RESPONSES TO PHQ QUESTIONS 1-9: 0
SUM OF ALL RESPONSES TO PHQ QUESTIONS 1-9: 0
SUM OF ALL RESPONSES TO PHQ9 QUESTIONS 1 & 2: 0
2. FEELING DOWN, DEPRESSED OR HOPELESS: 0
SUM OF ALL RESPONSES TO PHQ QUESTIONS 1-9: 0
1. LITTLE INTEREST OR PLEASURE IN DOING THINGS: 0
SUM OF ALL RESPONSES TO PHQ QUESTIONS 1-9: 0

## 2022-10-04 NOTE — PROGRESS NOTES
FM Progress Note    Subjective:   Still L shoulder pain and numbness/tingling finger 2 and 3 on L. Shoulder pain worse with turning head to L  and  chin to chest. Hard to sleep at night. Shoulder Xray neg. Elevated BP not controlled. In pain, stressed, taking ibuprofen. No cp or sob. Afib. Asa. Atenolol. Has appt with Cards coming up. GERD. Controlled. Prilosec. Health Maintenance Due   Topic Date Due    Shingles vaccine (1 of 2) Never done    Lipids  12/18/2021    COVID-19 Vaccine (4 - Booster for Moderna series) 03/04/2022    Depression Screen  06/18/2022    Flu vaccine (1) 08/01/2022       Objective:   BP (!) 154/94   Pulse 96   Temp 98.2 °F (36.8 °C)   Resp 16   Ht 6' (1.829 m)   Wt 184 lb (83.5 kg)   SpO2 96%   BMI 24.95 kg/m²   General appearance: NAD, alert and interacting appropriately  L shoulder: neg empty can, li, arm crossover. Pos spruling on L causing pain in L teres minor > worsening numbness fingertips. Pain worse with chin to chest. Neg tinnels cubital and carpal, some worsening numbness with radial nerve percussion. Neg phalens. Neg CT compression. I have reviewed this patient's previous records. I have reviewed this patient's labs. I have reviewed this patient's imaging reports. I have reviewed this patient's medications. Assessment/Plan:    Génesis Silverio was seen today for shoulder pain and weight loss. Diagnoses and all orders for this visit:    Neuropathy  -     Elastic Bandages & Supports (WRIST BRACE/LEFT MEDIUM) MISC; 1 each by Does not apply route daily    Gastroesophageal reflux disease, unspecified whether esophagitis present  -     omeprazole (PRILOSEC) 40 MG delayed release capsule; Take 1 capsule by mouth Daily    Cervical radiculopathy  -     meloxicam (MOBIC) 15 MG tablet;  Take 1 tablet by mouth daily  -     Elastic Bandages & Supports (WRIST BRACE/LEFT MEDIUM) MISC; 1 each by Does not apply route daily  -     XR CERVICAL SPINE (4-5 VIEWS); Future    Need for influenza vaccination  -     Influenza, FLUCELVAX, (age 10 mo+), IM, Preservative Free, 0.5 mL    Paroxysmal atrial fibrillation (HCC)    Elevated BP without diagnosis of hypertension      Return in about 1 month (around 11/4/2022). DDx: cervical stenosis 2/2 disc > stenosis. RC strain vs radial nerve > CTS    Consider EMG if not improved.          Electronically signed by Petra Homans, MD on 10/4/2022 at 1:07 PM

## 2022-10-06 ENCOUNTER — OFFICE VISIT (OUTPATIENT)
Dept: NON INVASIVE DIAGNOSTICS | Age: 62
End: 2022-10-06
Payer: COMMERCIAL

## 2022-10-06 ENCOUNTER — TELEPHONE (OUTPATIENT)
Dept: FAMILY MEDICINE CLINIC | Age: 62
End: 2022-10-06

## 2022-10-06 VITALS
BODY MASS INDEX: 24.65 KG/M2 | DIASTOLIC BLOOD PRESSURE: 78 MMHG | OXYGEN SATURATION: 95 % | RESPIRATION RATE: 16 BRPM | WEIGHT: 182 LBS | HEIGHT: 72 IN | SYSTOLIC BLOOD PRESSURE: 130 MMHG | HEART RATE: 90 BPM

## 2022-10-06 DIAGNOSIS — I48.0 PAROXYSMAL ATRIAL FIBRILLATION (HCC): Primary | ICD-10-CM

## 2022-10-06 DIAGNOSIS — R94.31 ABNORMAL EKG: ICD-10-CM

## 2022-10-06 DIAGNOSIS — M54.12 CERVICAL RADICULOPATHY: Primary | ICD-10-CM

## 2022-10-06 PROCEDURE — 93000 ELECTROCARDIOGRAM COMPLETE: CPT | Performed by: INTERNAL MEDICINE

## 2022-10-06 PROCEDURE — 99204 OFFICE O/P NEW MOD 45 MIN: CPT | Performed by: INTERNAL MEDICINE

## 2022-10-06 NOTE — TELEPHONE ENCOUNTER
X-ray shows pinching of nerves coming from cervical spine, both by stenosis and by degenerative disc disease. I have placed an urgent referral to Dr. Marely Ablarado with physical medicine and rehabilitation for further management. Please reach out to PMR to schedule patient. Thank you very much.

## 2022-10-06 NOTE — TELEPHONE ENCOUNTER
Pt calling for results of xrays. He states he wants results today. The medication he is on is not helping. Please advise.

## 2022-10-06 NOTE — PROGRESS NOTES
14 day Zio XT Monitor was placed per Dr Jason Reynoso. Serial number [id number]. Instructions were given & patient verbalized understanding.

## 2022-10-06 NOTE — PROGRESS NOTES
[facility],2[address] and Vascular [facility] Electrophysiology  Consultation Report  PATIENT: Teresa Mcmullen RECORD NUMBER: [id number]  DATE OF SERVICE:  10/6/2022  ATTENDING ELECTROPHYSIOLOGIST: Luca Mcqueen MD  REFERRING PHYSICIAN: Xiomy Harkins MD and Jessica Paulino MD  CHIEF COMPLAINT:   Chief Complaint   Patient presents with    Atrial Fibrillation     Consult, per Dr Nicole Smart d/t PAF. Is c/o worsening palpitations. HPI: This is a 58 y.o. male with a history of  PAF for 11 years who presents to cardiac electrophysiology clinic for consultation. The patient has no other major medical illnesses and says that atrial fibrillation was first discovered approximately 11 years ago by his physician in [place] where he lived at that time. Subsequently he has lived around [facility] for the past 10 years and has seen other primary care physicians but has never been referred to cardiology despite persistent atrial fibrillation documented by them. He has a sedentary job working at [employer] and has not noticed any symptoms including those of chest pain or shortness of breath at rest or on exertion. However he has had persistent left shoulder pain for the last 2 weeks. He notices the pain worsening with certain movements of the left arm or his neck. The pain is not worsened with exertion nor does he have any chest discomfort. He does complain of palpitations/rapid heartbeat in the recent weeks. No symptoms of paroxysmal nocturnal dyspnea, orthopnea or leg edema. No syncope or near syncope . He has been placed on atenolol 50 mg daily along with aspirin for the last 10 years. His friends and neighbors who are in the medical field urged him to be seen by a cardiologist and therefore he presents to my office for an evaluation.        Patient Active Problem List    Diagnosis Date Noted    Family history of colon cancer 08/05/2022     Priority: Medium    Chronic left shoulder pain 10/05/2018    Anxiety 02/16/2018    Sleep disturbance 02/16/2018    History of colon polyps     Adenomatous polyp of colon     Diverticulosis 10/13/2014    Tubular adenoma 10/13/2014    FH: colon cancer 09/12/2013    GERD (gastroesophageal reflux disease) 09/12/2013    Hypogonadism male 09/12/2013    Hyperlipidemia 09/12/2013    Paroxysmal atrial fibrillation ([address] 09/12/2013       Past Medical History:   Diagnosis Date    Anticoagulant long-term use     Anxiety     Atrial fibrillation (HCC)     GERD (gastroesophageal reflux disease)     Hyperlipidemia     Insomnia     Paroxysmal atrial fibrillation (HCC)        Family History   Problem Relation Age of Onset    Diabetes Mother         pre- daibetic    Hypertension Father     High Blood Pressure Father     High Cholesterol Father     Obesity Father     Colon Cancer Father     Cancer Sister     Cancer Paternal Grandmother         68    Stroke Paternal Grandfather    No family history of premature CAD    Social History     Tobacco Use    Smoking status: Never    Smokeless tobacco: Never   Substance Use Topics    Alcohol use:  Yes     Alcohol/week: 1.0 - 2.0 standard drink     Types: 1 - 2 Cans of beer per week       Current Outpatient Medications   Medication Sig Dispense Refill    omeprazole (PRILOSEC) 40 MG delayed release capsule Take 1 capsule by mouth Daily 90 capsule 1    meloxicam (MOBIC) 15 MG tablet Take 1 tablet by mouth daily 14 tablet 0    atenolol (TENORMIN) 50 MG tablet Take 1 tablet by mouth at bedtime 90 tablet 1    rosuvastatin (CRESTOR) 40 MG tablet Take 1 tablet by mouth daily 90 tablet 1    traZODone (DESYREL) 50 MG tablet Take 1 tablet by mouth nightly 90 tablet 1    aspirin 81 MG EC tablet Take 81 mg by mouth daily      melatonin 3 MG TABS tablet Take 2 tablets by mouth daily 60 tablet 3    Elastic Bandages & Supports (WRIST BRACE/LEFT MEDIUM) MISC 1 each by Does not apply route daily (Patient not taking: Reported on 10/6/2022) 1 each 0    diclofenac sodium (VOLTAREN) 1 % GEL Apply 2 g topically 4 times daily (Patient not taking: Reported on 10/6/2022) 150 g 1     No current facility-administered medications for this visit. No Known Allergies    ROS:   Constitutional: Negative for fever, activity change and appetite change. HENT: Negative for epistaxis. Eyes: Negative for diploplia, blurred vision. Respiratory: Negative for cough, chest tightness, shortness of breath and wheezing. Cardiovascular: pertinent positives in HPI  Gastrointestinal: Negative for abdominal pain and blood in stool. All other review of systems are negative     PHYSICAL EXAM:   Vitals:    10/06/22 0914   BP: 130/78   Site: Right Upper Arm   Position: Sitting   Cuff Size: Medium Adult   Pulse: 90   Resp: 16   SpO2: 95%   Weight: 182 lb (82.6 kg)   Height: 6' (1.829 m)      Constitutional: Well-developed, no acute distress  Eyes: conjunctivae normal, no xanthelasma   Ears, Nose, Throat: oral mucosa moist, no cyanosis   CV: PMI is laterally displaced, irregular rate and rhythm. Normal S1S2 and no S3. No murmurs, rubs, or gallops. Lungs: clear to auscultation bilaterally, normal respiratory effort without used of accessory muscles  Abdomen: soft, non-tender, bowel sounds present, no masses or hepatomegaly   Musculoskeletal: no digital clubbing, no edema   Skin: warm, no rashes     I have personally reviewed the laboratory, cardiac diagnostic and radiographic testing as outlined below:    Data:    No results for input(s): WBC, HGB, HCT, PLT in the last 72 hours. No results for input(s): NA, K, CL, CO2, BUN, CREATININE, GLU, CALCIUM in the last 72 hours. Invalid input(s): MAGNESIUM   Lab Results   Component Value Date/Time    MG 2.1 11/13/2021 10:50 AM     No results for input(s): TSH in the last 72 hours. No results for input(s): INR in the last 72 hours. EKG: Atrial Fibrillation ,PVCs Please see scan in Cardiology.     Echocardiogram: none    Cardiac Catheterization: None    Stress Test:   Impression:    Exercise EKG was negative. The patient experienced no chest pain with exercise. The myocardial perfusion imaging was normal.    Overall left ventricular systolic function was normal without regional wall motion abnormalities. Shetty treadmill score was 6 implying low risk. Exercise capacity was average. Low risk general exercise treadmill test.     Thank you for sending your patient to this [employer]. Electronically signed by Rain Richey MD on 11/15/21 at 5:23 PM EST        I have independently reviewed all of the ECGs and rhythm strips per above     Assessment/Plan:    Permanent atrial fibrillation. Review of all his EKGs dating back to 2015 show atrial fibrillation. He has been on rate control with atenolol as well as aspirin  [id number] score of 0 in view of his young age and no other previous cardiac /cerebrovascular history     2. Hyperlipidemia    3. Left arm pain of unclear etiology. Cardiac source cannot be completely ruled out. Patient sent me a message after he left the office that he has been having episodes of night sweats for the last 2 weeks. Recommendations:    Options of urgent hospitalization for cardiac work-up versus outpatient work-up discussed with patient in detail. Will attempt to obtain echocardiography ASAP. Outpatient event monitoring for 2 weeks  Increase atenolol to 100 mg daily  Follow-up in the office in 4-6 weeks    I have spent a total of 45-55 minutes with the patient  reviewing the above stated recommendations. And a total of >50% of that time involved face-to-face time providing counseling and or coordination of care with the other providers, preparation for the clinic visit, reviewing records/tests, counseling/education of the patient, ordering medications/tests/procedures, coordinating care, and documenting clinical information in the EHR.      Thank you for allowing me to participate in your patient's care. Please call me if there are any questions or concerns.       Fermin Johnson MD, [address] [facility]  Cardiac Electrophysiology  [facility]) Physicians  The Heart and Vascular [place]: [facility]

## 2022-10-07 ENCOUNTER — TELEPHONE (OUTPATIENT)
Dept: PHYSICAL MEDICINE AND REHAB | Age: 62
End: 2022-10-07

## 2022-10-07 ENCOUNTER — TELEPHONE (OUTPATIENT)
Dept: NON INVASIVE DIAGNOSTICS | Age: 62
End: 2022-10-07

## 2022-10-07 NOTE — TELEPHONE ENCOUNTER
Called the office and wanted to make sure that it was not for an EMG. His last office note indicated that he considered an EMG. Office staff states that it is for eval and treat.

## 2022-10-10 ENCOUNTER — TELEPHONE (OUTPATIENT)
Dept: CARDIOLOGY | Age: 62
End: 2022-10-10

## 2022-10-11 ENCOUNTER — HOSPITAL ENCOUNTER (OUTPATIENT)
Dept: CARDIOLOGY | Age: 62
Discharge: HOME | End: 2022-10-11
Payer: COMMERCIAL

## 2022-10-11 LAB
LV EF: 58 %
LVEF MODALITY: NORMAL

## 2022-10-11 PROCEDURE — 93306 TTE W/DOPPLER COMPLETE: CPT

## 2022-10-18 ENCOUNTER — OFFICE VISIT (OUTPATIENT)
Dept: PHYSICAL MEDICINE AND REHAB | Age: 62
End: 2022-10-18
Payer: COMMERCIAL

## 2022-10-18 VITALS
DIASTOLIC BLOOD PRESSURE: 88 MMHG | WEIGHT: 184 LBS | HEIGHT: 72 IN | HEART RATE: 101 BPM | SYSTOLIC BLOOD PRESSURE: 143 MMHG | BODY MASS INDEX: 24.92 KG/M2

## 2022-10-18 DIAGNOSIS — R29.898 LEFT ARM WEAKNESS: ICD-10-CM

## 2022-10-18 DIAGNOSIS — M54.12 CERVICAL RADICULOPATHY: Primary | ICD-10-CM

## 2022-10-18 DIAGNOSIS — R20.0 NUMBNESS AND TINGLING: ICD-10-CM

## 2022-10-18 DIAGNOSIS — R20.2 NUMBNESS AND TINGLING: ICD-10-CM

## 2022-10-18 DIAGNOSIS — M54.12 CERVICAL RADICULOPATHY: ICD-10-CM

## 2022-10-18 LAB
ANION GAP SERPL CALCULATED.3IONS-SCNC: 10 MMOL/L (ref 7–16)
BUN BLDV-MCNC: 11 MG/DL (ref 6–23)
CALCIUM SERPL-MCNC: 9.5 MG/DL (ref 8.6–10.2)
CHLORIDE BLD-SCNC: 98 MMOL/L (ref 98–107)
CO2: 27 MMOL/L (ref 22–29)
CREAT SERPL-MCNC: 1 MG/DL (ref 0.7–1.2)
GFR SERPL CREATININE-BSD FRML MDRD: >60 ML/MIN/1.73
GLUCOSE BLD-MCNC: 123 MG/DL (ref 74–99)
POTASSIUM SERPL-SCNC: 5.3 MMOL/L (ref 3.5–5)
SODIUM BLD-SCNC: 135 MMOL/L (ref 132–146)

## 2022-10-18 PROCEDURE — 99204 OFFICE O/P NEW MOD 45 MIN: CPT | Performed by: PHYSICAL MEDICINE & REHABILITATION

## 2022-10-18 RX ORDER — MELOXICAM 15 MG/1
15 TABLET ORAL DAILY
Qty: 30 TABLET | Refills: 0 | Status: SHIPPED | OUTPATIENT
Start: 2022-10-18

## 2022-10-18 NOTE — PROGRESS NOTES
Zoe Naidu, [address] [facility]  [address] [facility]. [facility] [place]  Phone: [phone]  Fax: [phone]    PCP: Suzette Koch MD  Date of visit: 10/18/22    Chief Complaint   Patient presents with    Neck Pain     New patient referred by Dr. Braden Carrel  Left fingers numb         Dear Dr. Braden Carrel,     Thank you for referring your patient to be seen. As you know,  Vladimir Orozco is a 58 y.o. male with past medical history as below who presents with left shoulder pain for 1 month. There was a sudden onset of pain after no known injury. Now, the pain is constant and occurs daily but has improved greatly. The pain is rated Pain Score:   1, is described as achy, and is located in the left shoulder without radiation to the arm, but he has numbness in left D2-3. The symptoms have been better since onset. The pain is better with  mobic . The pain is worse with  neck ROM . There is associated numbness/tingling. There is weakness (triceps). There is no bowel/bladder changes. Currently undergoing cardiac work up. The prior workup has included: Xray C spine     The prior treatment has included:  PT: none. Has been doing cervical stretches with relief    Chiropractic: none . Modalities: none   OTC Tylenol: none   NSAIDS:mobic with relief   Opioids: none   Membrane stabilizers: none    Muscle relaxers: none   Previous injections: none .    Previous surgery at this site: none    No Known Allergies    Current Outpatient Medications   Medication Sig Dispense Refill    meloxicam (MOBIC) 15 MG tablet Take 1 tablet by mouth daily 30 tablet 0    omeprazole (PRILOSEC) 40 MG delayed release capsule Take 1 capsule by mouth Daily 90 capsule 1    atenolol (TENORMIN) 50 MG tablet Take 1 tablet by mouth at bedtime 90 tablet 1    rosuvastatin (CRESTOR) 40 MG tablet Take 1 tablet by mouth daily 90 tablet 1    traZODone (DESYREL) 50 MG tablet Take 1 tablet by mouth nightly 90 tablet 1    aspirin 81 MG EC tablet Take 81 mg by mouth daily      melatonin 3 MG TABS tablet Take 2 tablets by mouth daily 60 tablet 3    Elastic Bandages & Supports (WRIST BRACE/LEFT MEDIUM) MISC 1 each by Does not apply route daily (Patient not taking: No sig reported) 1 each 0    diclofenac sodium (VOLTAREN) 1 % GEL Apply 2 g topically 4 times daily (Patient not taking: No sig reported) 150 g 1     Current Facility-Administered Medications   Medication Dose Route Frequency Provider Last Rate Last Admin    perflutren lipid microspheres (DEFINITY) injection 1.65 mg  1.5 mL IntraVENous ONCE PRN Damaso Patiño MD           Past Medical History:   Diagnosis Date    Anticoagulant long-term use     Anxiety     Atrial fibrillation (HCC)     GERD (gastroesophageal reflux disease)     Hyperlipidemia     Insomnia     Paroxysmal atrial fibrillation [facility])        Past Surgical History:   Procedure Laterality Date    APPENDECTOMY      CATARACT REMOVAL  2006    bilateral    COLONOSCOPY  01/2010    COLONOSCOPY  04/14/2017    COLONOSCOPY N/A 8/5/2022    COLONOSCOPY WITH BIOPSY performed by Cami Baeza MD at [address] [address] W/ BIOPSIES  08/05/2022    polyp; diveriticula--jose daniel    ENDOSCOPY, COLON, DIAGNOSTIC      EYE SURGERY  2006    bilateral cataract    TONSILLECTOMY      UPPER GASTROINTESTINAL ENDOSCOPY  2007    VASECTOMY         Family History   Problem Relation Age of Onset    Diabetes Mother         pre- daibetic    Hypertension Father     High Blood Pressure Father     High Cholesterol Father     Obesity Father     Colon Cancer Father     Cancer Sister     Cancer Paternal Grandmother         68    Stroke Paternal Grandfather        Social History     Tobacco Use    Smoking status: Never    Smokeless tobacco: Never   Vaping Use    Vaping Use: Never used   Substance Use Topics    Alcohol use:  Yes     Alcohol/week: 1.0 - 2.0 standard drink     Types: 1 - 2 Cans of beer per week    Drug use: Never          Functional Status: The patient is able to ambulate and perform activities of daily living without the use of an assistive device. Occupation: The patient is currently employed. ROS:    Constitutional: Denies fevers, chills, night sweats, unintentional weight loss     Skin: Denies rash or skin changes     Eyes: Denies vision changes    Ears/Nose/Throat: Denies nasal congestion or sore throat     Respiratory: Denies SOB or cough     Cardiovascular: Denies CP, palpitations, edema      Gastrointestinal: Denies abdominal pain,  N/V, constipation, or diarrhea    Genitourinary: Denies urinary symptoms    Neurologic: See HPI.     MSK: See HPI. Psychiatric: Denies sleep disturbance, anxiety, depression    Hematologic/Lymphatic/Immunologic: Denies bruising       Physical Exam:   Blood pressure (!) 143/88, pulse (!) 101, height 6' (1.829 m), weight 184 lb (83.5 kg). General: well developed and well nourished in no acute distress. Body habitus is normal.   HEENT: No rhinorrhea, sneezing, yawning, or lacrimation. No scleral icterus or conjunctival injection. Resp: symmetrical chest expansion, unlabored breathing, respirations unlabored. CV: Heart rate is regular. Peripheral pulses are palpable  Lymph: No visible regional lymphadenopathy. Skin: No rashes or ecchymosis. Normal turgor. Psych: Mood is calm. Affect is normal.   Ext: No edema noted     MSK:   Cervical Exam:   Inspection: Shoulder girdles were asymmetric. The cervical lordotic curvature was decreased. Palpatory exam: revealed no tenderness along bilateral upper, middle, and lower cervical paraspinals , no tenderness along upper, middle, and lower spinous processes, no tenderness of bilateral upper and middle trapezius muscle. There was no spasm. There were no trigger points. ROM: ROM decreased  Special/Provocative tests:   Spurling's maneuver was negative. Left shoulder ROM intact.    No tenderness     Neurological Exam:  Strength:   R  L  Deltoid   5  5  Biceps   5  5  Triceps  5  4  Wrist Ext  5  5  Finger Abd  5  5      Sensory:  Intact for light touch in all upper extremity dermatomes. Reflexes:   R  L  Biceps   (2+) (2+)  Triceps  (2+) (2+)  BR   (2+) (2+)  Patellar  (2+) (2+)    Gaitan is negative bilaterally. Gait is normal    Imaging: (personally reviewed by me 10/18/22)  X-ray C spine     Impression:   Diana Leal is a 58 y.o. male     1. Cervical radiculopathy    2. Left arm weakness    3. Numbness and tingling        Plan:   Referral placed to PT   Will obtain EMG LUE regarding C7 radic   Refill mobic. Will check kidney function for baseline   X-ray reviewed and interpreted     The patient was educated about the diagnosis, prognosis, indications, risks and benefits of treatment. An opportunity to ask questions was given to the patient and questions were answered. The patient agreed to proceed with the recommended treatment as described above. Follow up after PT      Thank you for the consultation and for allowing me to participate in the care of this patient.         Sincerely,     Alfred Vera DO [facility]   Board Certified Physical Medicine and Rehabilitation

## 2022-10-20 ENCOUNTER — TELEPHONE (OUTPATIENT)
Dept: ADMINISTRATIVE | Age: 62
End: 2022-10-20

## 2022-10-20 NOTE — TELEPHONE ENCOUNTER
Pt calling for echo result from 10/11/22. He also wanted to clarify his atenolol dose; he states Dr. Jason Reynoso put him on 100 mg daily and he wanted to verify this dose. And to see who is to call in refills for him - Jason Reynoso or PCP?

## 2022-10-21 ENCOUNTER — TELEPHONE (OUTPATIENT)
Dept: NON INVASIVE DIAGNOSTICS | Age: 62
End: 2022-10-21

## 2022-10-21 RX ORDER — ATENOLOL 100 MG/1
100 TABLET ORAL DAILY
Qty: 30 TABLET | Refills: 6 | Status: ON HOLD
Start: 2022-10-21 | End: 2022-10-28 | Stop reason: HOSPADM

## 2022-10-21 RX ORDER — ATENOLOL 100 MG/1
100 TABLET ORAL DAILY
COMMUNITY
End: 2022-10-21 | Stop reason: SDUPTHER

## 2022-10-21 NOTE — TELEPHONE ENCOUNTER
----- Message from Holden Kidd MD sent at 10/20/2022  4:32 PM EDT -----  It is 100 mg daily. I called him and talked to him about his Echo  MR  ----- Message -----  From: Umesh Leal  Sent: 10/20/2022   1:23 PM EDT  To: Holden Kidd MD    [facility] called regarding his Echo results. He also has a question on his Atenolol. He states that you told him to increase his Atenolol to 100 mg a day but the note says to increase Atenolol 200 mg daily.   Please advise

## 2022-10-21 NOTE — TELEPHONE ENCOUNTER
----- Message from Abdifatah Hull MD sent at 10/20/2022  4:32 PM EDT -----  It is 100 mg daily. I called him and talked to him about his Echo  MR  ----- Message -----  From: Marlena Cherry  Sent: 10/20/2022   1:23 PM EDT  To: MD Giacomo Byrd called regarding his Echo results. He also has a question on his Atenolol. He states that you told him to increase his Atenolol to 100 mg a day but the note says to increase Atenolol 200 mg daily.   Please advise

## 2022-10-24 ENCOUNTER — HOSPITAL ENCOUNTER (INPATIENT)
Age: 62
LOS: 4 days | Discharge: HOME | DRG: 392 | End: 2022-10-28
Attending: EMERGENCY MEDICINE
Payer: COMMERCIAL

## 2022-10-24 ENCOUNTER — APPOINTMENT (OUTPATIENT)
Dept: GENERAL RADIOLOGY | Age: 62
DRG: 392 | End: 2022-10-24
Payer: COMMERCIAL

## 2022-10-24 ENCOUNTER — APPOINTMENT (OUTPATIENT)
Dept: CT IMAGING | Age: 62
DRG: 392 | End: 2022-10-24
Payer: COMMERCIAL

## 2022-10-24 DIAGNOSIS — I48.91 ATRIAL FIBRILLATION WITH RVR (HCC): ICD-10-CM

## 2022-10-24 DIAGNOSIS — K57.92 ACUTE DIVERTICULITIS: Primary | ICD-10-CM

## 2022-10-24 LAB
ALBUMIN SERPL-MCNC: 3.9 G/DL (ref 3.5–5.2)
ALP BLD-CCNC: 55 U/L (ref 40–129)
ALT SERPL-CCNC: 13 U/L (ref 0–40)
ANION GAP SERPL CALCULATED.3IONS-SCNC: 13 MMOL/L (ref 7–16)
ANION GAP SERPL CALCULATED.3IONS-SCNC: 18 MMOL/L (ref 7–16)
AST SERPL-CCNC: 14 U/L (ref 0–39)
BACTERIA: NORMAL /HPF
BASOPHILS ABSOLUTE: 0.04 E9/L (ref 0–0.2)
BASOPHILS RELATIVE PERCENT: 0.3 % (ref 0–2)
BILIRUB SERPL-MCNC: 1.5 MG/DL (ref 0–1.2)
BILIRUBIN URINE: NEGATIVE
BLOOD, URINE: NORMAL
BUN BLDV-MCNC: 10 MG/DL (ref 6–23)
BUN BLDV-MCNC: 8 MG/DL (ref 6–23)
BURR CELLS: ABNORMAL
CALCIUM SERPL-MCNC: 8.6 MG/DL (ref 8.6–10.2)
CALCIUM SERPL-MCNC: 9.4 MG/DL (ref 8.6–10.2)
CHLORIDE BLD-SCNC: 88 MMOL/L (ref 98–107)
CHLORIDE BLD-SCNC: 95 MMOL/L (ref 98–107)
CLARITY: CLEAR
CO2: 21 MMOL/L (ref 22–29)
CO2: 24 MMOL/L (ref 22–29)
COLOR: YELLOW
CREAT SERPL-MCNC: 0.9 MG/DL (ref 0.7–1.2)
CREAT SERPL-MCNC: 1.2 MG/DL (ref 0.7–1.2)
EOSINOPHILS ABSOLUTE: 0.01 E9/L (ref 0.05–0.5)
EOSINOPHILS RELATIVE PERCENT: 0.1 % (ref 0–6)
GFR SERPL CREATININE-BSD FRML MDRD: >60 ML/MIN/1.73
GFR SERPL CREATININE-BSD FRML MDRD: >60 ML/MIN/1.73
GLUCOSE BLD-MCNC: 144 MG/DL (ref 74–99)
GLUCOSE BLD-MCNC: 195 MG/DL (ref 74–99)
GLUCOSE URINE: NEGATIVE MG/DL
HCT VFR BLD CALC: 46.7 % (ref 37–54)
HEMOGLOBIN: 16.1 G/DL (ref 12.5–16.5)
IMMATURE GRANULOCYTES #: 0.1 E9/L
IMMATURE GRANULOCYTES %: 0.6 % (ref 0–5)
KETONES, URINE: NEGATIVE MG/DL
LACTIC ACID: 1.5 MMOL/L (ref 0.5–2.2)
LACTIC ACID: 3.5 MMOL/L (ref 0.5–2.2)
LEUKOCYTE ESTERASE, URINE: NEGATIVE
LIPASE: 13 U/L (ref 13–60)
LYMPHOCYTES ABSOLUTE: 0.3 E9/L (ref 1.5–4)
LYMPHOCYTES RELATIVE PERCENT: 1.9 % (ref 20–42)
MAGNESIUM: 2 MG/DL (ref 1.6–2.6)
MCH RBC QN AUTO: 29.7 PG (ref 26–35)
MCHC RBC AUTO-ENTMCNC: 34.5 % (ref 32–34.5)
MCV RBC AUTO: 86 FL (ref 80–99.9)
MONOCYTES ABSOLUTE: 0.51 E9/L (ref 0.1–0.95)
MONOCYTES RELATIVE PERCENT: 3.2 % (ref 2–12)
NEUTROPHILS ABSOLUTE: 14.95 E9/L (ref 1.8–7.3)
NEUTROPHILS RELATIVE PERCENT: 93.9 % (ref 43–80)
NITRITE, URINE: NEGATIVE
OVALOCYTES: ABNORMAL
PDW BLD-RTO: 13.5 FL (ref 11.5–15)
PH UA: 6 (ref 5–9)
PLATELET # BLD: 223 E9/L (ref 130–450)
PMV BLD AUTO: 9.9 FL (ref 7–12)
POIKILOCYTES: ABNORMAL
POLYCHROMASIA: ABNORMAL
POTASSIUM REFLEX MAGNESIUM: 4.1 MMOL/L (ref 3.5–5)
POTASSIUM SERPL-SCNC: 3.9 MMOL/L (ref 3.5–5)
PROTEIN UA: NORMAL MG/DL
RBC # BLD: 5.43 E12/L (ref 3.8–5.8)
RBC UA: NORMAL /HPF (ref 0–2)
RENAL EPITHELIAL, UA: NORMAL /HPF
SARS-COV-2, NAAT: NOT DETECTED
SODIUM BLD-SCNC: 127 MMOL/L (ref 132–146)
SODIUM BLD-SCNC: 132 MMOL/L (ref 132–146)
SPECIFIC GRAVITY UA: 1.01 (ref 1–1.03)
TOTAL PROTEIN: 7.3 G/DL (ref 6.4–8.3)
TROPONIN, HIGH SENSITIVITY: 12 NG/L (ref 0–11)
TROPONIN, HIGH SENSITIVITY: 9 NG/L (ref 0–11)
UROBILINOGEN, URINE: 0.2 E.U./DL
WBC # BLD: 15.9 E9/L (ref 4.5–11.5)
WBC UA: NORMAL /HPF (ref 0–5)

## 2022-10-24 PROCEDURE — 96365 THER/PROPH/DIAG IV INF INIT: CPT

## 2022-10-24 PROCEDURE — 74174 CTA ABD&PLVS W/CONTRAST: CPT

## 2022-10-24 PROCEDURE — 2580000003 HC RX 258: Performed by: EMERGENCY MEDICINE

## 2022-10-24 PROCEDURE — 83735 ASSAY OF MAGNESIUM: CPT

## 2022-10-24 PROCEDURE — 83690 ASSAY OF LIPASE: CPT

## 2022-10-24 PROCEDURE — 80048 BASIC METABOLIC PNL TOTAL CA: CPT

## 2022-10-24 PROCEDURE — 80053 COMPREHEN METABOLIC PANEL: CPT

## 2022-10-24 PROCEDURE — 81001 URINALYSIS AUTO W/SCOPE: CPT

## 2022-10-24 PROCEDURE — C9113 INJ PANTOPRAZOLE SODIUM, VIA: HCPCS | Performed by: STUDENT IN AN ORGANIZED HEALTH CARE EDUCATION/TRAINING PROGRAM

## 2022-10-24 PROCEDURE — 93005 ELECTROCARDIOGRAM TRACING: CPT | Performed by: NURSE PRACTITIONER

## 2022-10-24 PROCEDURE — 2500000003 HC RX 250 WO HCPCS: Performed by: EMERGENCY MEDICINE

## 2022-10-24 PROCEDURE — 87635 SARS-COV-2 COVID-19 AMP PRB: CPT

## 2022-10-24 PROCEDURE — 99285 EMERGENCY DEPT VISIT HI MDM: CPT

## 2022-10-24 PROCEDURE — 85025 COMPLETE CBC W/AUTO DIFF WBC: CPT

## 2022-10-24 PROCEDURE — 83605 ASSAY OF LACTIC ACID: CPT

## 2022-10-24 PROCEDURE — 93005 ELECTROCARDIOGRAM TRACING: CPT | Performed by: EMERGENCY MEDICINE

## 2022-10-24 PROCEDURE — 84484 ASSAY OF TROPONIN QUANT: CPT

## 2022-10-24 PROCEDURE — 2500000003 HC RX 250 WO HCPCS

## 2022-10-24 PROCEDURE — 96375 TX/PRO/DX INJ NEW DRUG ADDON: CPT

## 2022-10-24 PROCEDURE — 6360000002 HC RX W HCPCS: Performed by: EMERGENCY MEDICINE

## 2022-10-24 PROCEDURE — 6360000004 HC RX CONTRAST MEDICATION: Performed by: RADIOLOGY

## 2022-10-24 PROCEDURE — 71045 X-RAY EXAM CHEST 1 VIEW: CPT

## 2022-10-24 PROCEDURE — 6360000002 HC RX W HCPCS

## 2022-10-24 PROCEDURE — 99223 1ST HOSP IP/OBS HIGH 75: CPT | Performed by: INTERNAL MEDICINE

## 2022-10-24 PROCEDURE — 2060000000 HC ICU INTERMEDIATE R&B

## 2022-10-24 PROCEDURE — 6360000002 HC RX W HCPCS: Performed by: STUDENT IN AN ORGANIZED HEALTH CARE EDUCATION/TRAINING PROGRAM

## 2022-10-24 PROCEDURE — 2580000003 HC RX 258

## 2022-10-24 RX ORDER — ROSUVASTATIN CALCIUM 20 MG/1
40 TABLET, COATED ORAL DAILY
Status: DISCONTINUED | OUTPATIENT
Start: 2022-10-24 | End: 2022-10-28 | Stop reason: HOSPADM

## 2022-10-24 RX ORDER — METRONIDAZOLE 500 MG/100ML
500 INJECTION, SOLUTION INTRAVENOUS ONCE
Status: COMPLETED | OUTPATIENT
Start: 2022-10-24 | End: 2022-10-24

## 2022-10-24 RX ORDER — DILTIAZEM HYDROCHLORIDE 5 MG/ML
15 INJECTION INTRAVENOUS ONCE
Status: COMPLETED | OUTPATIENT
Start: 2022-10-24 | End: 2022-10-24

## 2022-10-24 RX ORDER — TRAZODONE HYDROCHLORIDE 50 MG/1
50 TABLET ORAL NIGHTLY
Status: DISCONTINUED | OUTPATIENT
Start: 2022-10-24 | End: 2022-10-28 | Stop reason: HOSPADM

## 2022-10-24 RX ORDER — SODIUM CHLORIDE 0.9 % (FLUSH) 0.9 %
10 SYRINGE (ML) INJECTION
Status: ACTIVE | OUTPATIENT
Start: 2022-10-24 | End: 2022-10-25

## 2022-10-24 RX ORDER — SODIUM CHLORIDE 9 MG/ML
INJECTION, SOLUTION INTRAVENOUS CONTINUOUS
Status: DISCONTINUED | OUTPATIENT
Start: 2022-10-24 | End: 2022-10-28 | Stop reason: HOSPADM

## 2022-10-24 RX ORDER — 0.9 % SODIUM CHLORIDE 0.9 %
1000 INTRAVENOUS SOLUTION INTRAVENOUS ONCE
Status: COMPLETED | OUTPATIENT
Start: 2022-10-24 | End: 2022-10-24

## 2022-10-24 RX ORDER — METRONIDAZOLE 500 MG/100ML
500 INJECTION, SOLUTION INTRAVENOUS EVERY 8 HOURS
Status: COMPLETED | OUTPATIENT
Start: 2022-10-24 | End: 2022-10-28

## 2022-10-24 RX ORDER — ONDANSETRON 2 MG/ML
4 INJECTION INTRAMUSCULAR; INTRAVENOUS ONCE
Status: COMPLETED | OUTPATIENT
Start: 2022-10-24 | End: 2022-10-24

## 2022-10-24 RX ORDER — LANOLIN ALCOHOL/MO/W.PET/CERES
6 CREAM (GRAM) TOPICAL DAILY
Status: DISCONTINUED | OUTPATIENT
Start: 2022-10-24 | End: 2022-10-28 | Stop reason: HOSPADM

## 2022-10-24 RX ORDER — ENOXAPARIN SODIUM 100 MG/ML
40 INJECTION SUBCUTANEOUS DAILY
Status: DISCONTINUED | OUTPATIENT
Start: 2022-10-24 | End: 2022-10-28 | Stop reason: HOSPADM

## 2022-10-24 RX ORDER — PANTOPRAZOLE SODIUM 40 MG/1
40 TABLET, DELAYED RELEASE ORAL
Status: DISCONTINUED | OUTPATIENT
Start: 2022-10-25 | End: 2022-10-28 | Stop reason: HOSPADM

## 2022-10-24 RX ORDER — SODIUM CHLORIDE 0.9 % (FLUSH) 0.9 %
5-40 SYRINGE (ML) INJECTION PRN
Status: DISCONTINUED | OUTPATIENT
Start: 2022-10-24 | End: 2022-10-28 | Stop reason: HOSPADM

## 2022-10-24 RX ORDER — PANTOPRAZOLE SODIUM 40 MG/10ML
40 INJECTION, POWDER, LYOPHILIZED, FOR SOLUTION INTRAVENOUS DAILY
Status: DISCONTINUED | OUTPATIENT
Start: 2022-10-24 | End: 2022-10-25

## 2022-10-24 RX ORDER — ATENOLOL 50 MG/1
100 TABLET ORAL DAILY
Status: DISCONTINUED | OUTPATIENT
Start: 2022-10-24 | End: 2022-10-25

## 2022-10-24 RX ORDER — FENTANYL CITRATE 50 UG/ML
50 INJECTION, SOLUTION INTRAMUSCULAR; INTRAVENOUS ONCE
Status: DISCONTINUED | OUTPATIENT
Start: 2022-10-24 | End: 2022-10-28 | Stop reason: HOSPADM

## 2022-10-24 RX ORDER — MELOXICAM 7.5 MG/1
15 TABLET ORAL DAILY
Status: DISCONTINUED | OUTPATIENT
Start: 2022-10-24 | End: 2022-10-28 | Stop reason: HOSPADM

## 2022-10-24 RX ORDER — 0.9 % SODIUM CHLORIDE 0.9 %
500 INTRAVENOUS SOLUTION INTRAVENOUS ONCE
Status: COMPLETED | OUTPATIENT
Start: 2022-10-24 | End: 2022-10-24

## 2022-10-24 RX ORDER — ACETAMINOPHEN 650 MG/1
650 SUPPOSITORY RECTAL EVERY 6 HOURS PRN
Status: DISCONTINUED | OUTPATIENT
Start: 2022-10-24 | End: 2022-10-28 | Stop reason: HOSPADM

## 2022-10-24 RX ORDER — ASPIRIN 81 MG/1
81 TABLET ORAL DAILY
Status: DISCONTINUED | OUTPATIENT
Start: 2022-10-25 | End: 2022-10-28 | Stop reason: HOSPADM

## 2022-10-24 RX ORDER — MAGNESIUM SULFATE IN WATER 40 MG/ML
2000 INJECTION, SOLUTION INTRAVENOUS ONCE
Status: COMPLETED | OUTPATIENT
Start: 2022-10-24 | End: 2022-10-24

## 2022-10-24 RX ORDER — POLYETHYLENE GLYCOL 3350 17 G/17G
17 POWDER, FOR SOLUTION ORAL DAILY PRN
Status: DISCONTINUED | OUTPATIENT
Start: 2022-10-24 | End: 2022-10-28 | Stop reason: HOSPADM

## 2022-10-24 RX ORDER — ACETAMINOPHEN 325 MG/1
650 TABLET ORAL EVERY 6 HOURS PRN
Status: DISCONTINUED | OUTPATIENT
Start: 2022-10-24 | End: 2022-10-28 | Stop reason: HOSPADM

## 2022-10-24 RX ORDER — MORPHINE SULFATE 2 MG/ML
2 INJECTION, SOLUTION INTRAMUSCULAR; INTRAVENOUS EVERY 4 HOURS PRN
Status: DISCONTINUED | OUTPATIENT
Start: 2022-10-24 | End: 2022-10-28 | Stop reason: HOSPADM

## 2022-10-24 RX ORDER — SODIUM CHLORIDE 0.9 % (FLUSH) 0.9 %
5-40 SYRINGE (ML) INJECTION EVERY 12 HOURS SCHEDULED
Status: DISCONTINUED | OUTPATIENT
Start: 2022-10-24 | End: 2022-10-28 | Stop reason: HOSPADM

## 2022-10-24 RX ADMIN — DILTIAZEM HYDROCHLORIDE 15 MG: 5 INJECTION, SOLUTION INTRAVENOUS at 12:58

## 2022-10-24 RX ADMIN — CEFTRIAXONE 1000 MG: 1 INJECTION, POWDER, FOR SOLUTION INTRAMUSCULAR; INTRAVENOUS at 12:26

## 2022-10-24 RX ADMIN — SODIUM CHLORIDE 500 ML: 9 INJECTION, SOLUTION INTRAVENOUS at 12:26

## 2022-10-24 RX ADMIN — MAGNESIUM SULFATE HEPTAHYDRATE 2000 MG: 40 INJECTION, SOLUTION INTRAVENOUS at 13:47

## 2022-10-24 RX ADMIN — ONDANSETRON 4 MG: 2 INJECTION INTRAMUSCULAR; INTRAVENOUS at 08:56

## 2022-10-24 RX ADMIN — DEXTROSE MONOHYDRATE 2.5 MG/HR: 50 INJECTION, SOLUTION INTRAVENOUS at 13:46

## 2022-10-24 RX ADMIN — PANTOPRAZOLE SODIUM 40 MG: 40 INJECTION, POWDER, FOR SOLUTION INTRAVENOUS at 18:57

## 2022-10-24 RX ADMIN — SODIUM CHLORIDE: 9 INJECTION, SOLUTION INTRAVENOUS at 16:17

## 2022-10-24 RX ADMIN — SODIUM CHLORIDE 1000 ML: 9 INJECTION, SOLUTION INTRAVENOUS at 08:57

## 2022-10-24 RX ADMIN — METRONIDAZOLE 500 MG: 500 INJECTION, SOLUTION INTRAVENOUS at 12:28

## 2022-10-24 RX ADMIN — TRIMETHOBENZAMIDE HYDROCHLORIDE 200 MG: 100 INJECTION INTRAMUSCULAR at 22:48

## 2022-10-24 RX ADMIN — IOPAMIDOL 75 ML: 755 INJECTION, SOLUTION INTRAVENOUS at 09:58

## 2022-10-24 RX ADMIN — TRIMETHOBENZAMIDE HYDROCHLORIDE 200 MG: 100 INJECTION INTRAMUSCULAR at 16:12

## 2022-10-24 RX ADMIN — ENOXAPARIN SODIUM 40 MG: 100 INJECTION SUBCUTANEOUS at 16:13

## 2022-10-24 RX ADMIN — METRONIDAZOLE 500 MG: 5 INJECTION, SOLUTION INTRAVENOUS at 19:47

## 2022-10-24 ASSESSMENT — PAIN - FUNCTIONAL ASSESSMENT: PAIN_FUNCTIONAL_ASSESSMENT: 0-10

## 2022-10-24 ASSESSMENT — ENCOUNTER SYMPTOMS
SHORTNESS OF BREATH: 0
VOMITING: 1
ABDOMINAL PAIN: 1
TROUBLE SWALLOWING: 0
BLOOD IN STOOL: 0
COLOR CHANGE: 0
DIARRHEA: 1
RHINORRHEA: 0
COUGH: 0
NAUSEA: 1

## 2022-10-24 ASSESSMENT — PAIN SCALES - GENERAL: PAINLEVEL_OUTOF10: 6

## 2022-10-24 NOTE — ED NOTES
Pt placed on cardiac monitor, bp cuff, and continuous pulse ox.       Lesly Stringer RN  10/24/22 0083

## 2022-10-24 NOTE — CONSULTS
GENERAL SURGERY  CONSULT NOTE  10/24/2022    Physician Consulted: Dr. Vanna Diamond  Reason for Consult: Diverticulitis w microperf       HPI  Harsh Paz is a 58 y.o. male who presents for evaluation of diverticulitis. Pt states that symptoms began on 10/21, patient had upset stomach, abdominal pain, diarrhea that started on that date. Pt admits to intermittent nausea, but denies emesis, hematochezia and blood per rectum. Has been able to tolerate PO intake, but has diminished appetite. Denies weight loss. Symptoms did not subside by 10/24 so patient decided to come to ED. Pt denies prev hx of diverticulitis, and only previous abdominal surgery was appendectomy several decades ago. Recent colonoscopy w Dr. Celina Shannon had 1 polyp at 60cm, diverticula noted at 30cm. Past Medical History:   Diagnosis Date    Anticoagulant long-term use     Anxiety     Atrial fibrillation (HCC)     GERD (gastroesophageal reflux disease)     Hyperlipidemia     Insomnia     Paroxysmal atrial fibrillation [facility])        Past Surgical History:   Procedure Laterality Date    APPENDECTOMY      CATARACT REMOVAL  2006    bilateral    COLONOSCOPY  01/2010    COLONOSCOPY  04/14/2017    COLONOSCOPY N/A 8/5/2022    COLONOSCOPY WITH BIOPSY performed by Carrol Baxter MD at [address] W/ BIOPSIES  08/05/2022    polyp; diveriticula--jose daniel    ENDOSCOPY, COLON, DIAGNOSTIC      EYE SURGERY  2006    bilateral cataract    TONSILLECTOMY      UPPER GASTROINTESTINAL ENDOSCOPY  2007    VASECTOMY         Medications Prior to Admission    Prior to Admission medications    Medication Sig Start Date End Date Taking?  Authorizing Provider   atenolol (TENORMIN) 100 MG tablet Take 1 tablet by mouth daily 10/21/22   Shahid Hong MD   meloxicam (MOBIC) 15 MG tablet Take 1 tablet by mouth daily 10/18/22   Zoey Weldon DO   omeprazole (PRILOSEC) 40 MG delayed release capsule Take 1 capsule by mouth Daily 10/4/22   Dariel Rosenberg MD

## 2022-10-24 NOTE — CONSULTS
[facility],[address] and [facility] Electrophysiology  Consultation Report  PATIENT: Teresa Mcmullen RECORD NUMBER: [id number]  DATE OF SERVICE:  10/24/2022  ATTENDING ELECTROPHYSIOLOGIST: Katlyn Blevins MD   PRIMARY ELECTROPHYSIOLOGIST: Shena Cintron MD   REFERRING PHYSICIAN:  Lyssa Dee MD  CHIEF COMPLAINT: Abdominal pain. HPI: This is a 58 y.o. male with a history of longstanding persistent atrial fibrillation for the last 11 years with EKGs documenting AF since 2015 rate control with Atenolol, and given his CHADS-VASc of 0 on ASA, anxiety, hyperlipidemia, GERD, Diverticulitis. Due to the request of friends he was seen by Dr. Juan Geiger on 10/06/2022 he then underwent an echocardiogram on 10/11/2022 that showed an LVEF of 55-60%, with mildly dilated LA, his Atenolol was increased to 100mg nightly and was ordered a 2 week Zio monitor. Due to abdominal pain he presented to the ER on 10/24/2022 and was found to have AF with rates in the 120's and was started on IV Cardizem and his rates when into the 90's. This was in the setting of acute diverticulitis a CTA of the abdomen and Pelvis showed sigmoid diverticulitis with extensive surrounding stranding and fluid, extraluminal air thus general surgery was consulted and he was started on IV antibiotics. He has no cardiac complaints at this time. Patient Active Problem List   Diagnosis    FH: colon cancer    GERD (gastroesophageal reflux disease)    Hypogonadism male    Hyperlipidemia    Paroxysmal atrial fibrillation (HCC)    Diverticulosis    Tubular adenoma    History of colon polyps    Adenomatous polyp of colon    Anxiety    Sleep disturbance    Chronic left shoulder pain    Family history of colon cancer    Acute diverticulitis   who presents to the hospital complaining of abdominal pain. Cardiac electrophysiology service is consulted for persistent atrial fibrillation.     Patient Active Problem List Diagnosis Date Noted    Acute diverticulitis 10/24/2022     Priority: Medium    Family history of colon cancer 08/05/2022     Priority: Medium    Chronic left shoulder pain 10/05/2018    Anxiety 02/16/2018    Sleep disturbance 02/16/2018    History of colon polyps     Adenomatous polyp of colon     Diverticulosis 10/13/2014    Tubular adenoma 10/13/2014    FH: colon cancer 09/12/2013    GERD (gastroesophageal reflux disease) 09/12/2013    Hypogonadism male 09/12/2013    Hyperlipidemia 09/12/2013    Paroxysmal atrial fibrillation ([address] 09/12/2013       Past Medical History:   Diagnosis Date    Anticoagulant long-term use     Anxiety     Atrial fibrillation (HCC)     GERD (gastroesophageal reflux disease)     Hyperlipidemia     Insomnia     Paroxysmal atrial fibrillation (HCC)        Family History   Problem Relation Age of Onset    Diabetes Mother         pre- daibetic    Hypertension Father     High Blood Pressure Father     High Cholesterol Father     Obesity Father     Colon Cancer Father     Cancer Sister     Cancer Paternal Grandmother         68    Stroke Paternal Grandfather        Social History     Tobacco Use    Smoking status: Never    Smokeless tobacco: Never   Substance Use Topics    Alcohol use:  Yes     Alcohol/week: 1.0 - 2.0 standard drink     Types: 1 - 2 Cans of beer per week       Current Facility-Administered Medications   Medication Dose Route Frequency Provider Last Rate Last Admin    fentaNYL (SUBLIMAZE) injection 50 mcg  50 mcg IntraVENous Once Jaime Keith MD        sodium chloride flush 0.9 % injection 10 mL  10 mL IntraVENous Once PRN Komal Sanders MD        dilTIAZem 100 mg in dextrose 5 % 100 mL infusion ([facility])  2.5-15 mg/hr IntraVENous Continuous Jaime Keith MD 2.5 mL/hr at 10/24/22 1346 2.5 mg/hr at 10/24/22 1346    sodium chloride flush 0.9 % injection 5-40 mL  5-40 mL IntraVENous 2 times per day Annabelle Diaz MD        sodium chloride flush 0.9 % injection 5-40 mL 5-40 mL IntraVENous PRN Shwetha Guardado MD        polyethylene glycol [facility]) packet 17 g  17 g Oral Daily PRN [facility] Syeda Guardado MD        acetaminophen (TYLENOL) tablet 650 mg  650 mg Oral Q6H PRN Shwetha Guardado MD        Or    acetaminophen (TYLENOL) suppository 650 mg  650 mg Rectal Q6H PRN Shwetha Guardado MD        0.9 % sodium chloride infusion   IntraVENous Continuous Shwetha Guardado MD        trimethobenzamide [place] Route) injection 200 mg  200 mg IntraMUSCular Q6H PRN Shwetha Guardado MD        morphine (PF) injection 2 mg  2 mg IntraVENous Q4H PRN Shwetha Guardado MD        metronidazole (FLAGYL) 500 mg in 0.9% NaCl 100 mL IVPB premix  500 mg IntraVENous Q8H Shwetha Guardado MD        [START ON 10/25/2022] cefTRIAXone (ROCEPHIN) 1,000 mg in sterile water 10 mL IV syringe  1,000 mg IntraVENous Q24H Shwetha Guardado MD        enoxaparin (LOVENOX) injection 40 mg  40 mg SubCUTAneous Daily Shwetha Guardado MD         Current Outpatient Medications   Medication Sig Dispense Refill    atenolol (TENORMIN) 100 MG tablet Take 1 tablet by mouth daily 30 tablet 6    meloxicam (MOBIC) 15 MG tablet Take 1 tablet by mouth daily 30 tablet 0    omeprazole (PRILOSEC) 40 MG delayed release capsule Take 1 capsule by mouth Daily 90 capsule 1    rosuvastatin (CRESTOR) 40 MG tablet Take 1 tablet by mouth daily 90 tablet 1    traZODone (DESYREL) 50 MG tablet Take 1 tablet by mouth nightly 90 tablet 1    aspirin 81 MG EC tablet Take 81 mg by mouth daily      melatonin 3 MG TABS tablet Take 2 tablets by mouth daily 60 tablet 3        No Known Allergies    ROS:   Constitutional: Negative for fever, activity change and + for appetite change. HENT: Negative for epistaxis. Eyes: Negative for diploplia, blurred vision. Respiratory: Negative for cough, chest tightness, shortness of breath and wheezing. Cardiovascular: pertinent positives in HPI  Gastrointestinal: Negative for  and blood in stool.  + for abdominal pain and nausea  All other review of systems are negative     PHYSICAL EXAM:   Vitals:    10/24/22 1430 10/24/22 1500 10/24/22 1515 10/24/22 1530   BP: 119/60 123/79 110/86 109/83   Pulse: (!) 116 93 (!) 104 99   Resp: 20 25 19 17   Temp:       SpO2: 94% 94% 95% 96%   Weight:          Constitutional: Well-developed, no acute distress  Eyes: conjunctivae normal, no xanthelasma   Ears, Nose, Throat: oral mucosa moist, no cyanosis   CV: no JVD. IRIR. Normal S1S2 and no S3. No murmurs, rubs, or gallops. PMI is nondisplaced  Lungs: clear to auscultation bilaterally, normal respiratory effort without used of accessory muscles  Abdomen: soft, ntender, bowel sounds present, no masses or hepatomegaly   Musculoskeletal: no digital clubbing, no edema   Skin: warm, no rashes     I have personally reviewed the laboratory, cardiac diagnostic and radiographic testing as outlined below:    Data:    Recent Labs     10/24/22  0800   WBC 15.9*   HGB 16.1   HCT 46.7        Recent Labs     10/24/22  0800   *   K 3.9   CL 88*   CO2 21*   BUN 10   CREATININE 1.2   CALCIUM 9.4      Lab Results   Component Value Date/Time    MG 2.0 10/24/2022 12:12 PM     No results for input(s): TSH in the last 72 hours. No results for input(s): INR in the last 72 hours. CXR 10/24/2022:      FINDINGS:   Single AP upright portable chest demonstrate satisfactory expansion lungs   which are clear. The cardiac silhouette appears unremarkable. There is no   evidence of a pneumothorax. Impression   No acute cardiopulmonary process. Telemetry: Atrial fibrillation rates  bpm     EKG: pending Please see scan in Cardiology. Echocardiogram 10/11/2022:   Findings      Left Ventricle   Normal left ventricle size. Normal left ventricle wall thickness. No wall motion abnormalities. Indeterminate diastolic function. Ejection fraction is visually estimated at 55-60%.       Right Ventricle   Normal right ventricle structure and function. Left Atrium   The left atrium is mildly dilated by LISA. Right Atrium   Mildly enlarged right atrium size. Mitral Valve   Physiologic and/or trace mitral regurgitation is present. Tricuspid Valve   Normal tricuspid valve structure and function. RVSP is 35 mmHg. Aortic Valve   Trileaflet aortic valve. Normal leaflet mobility. No aortic stenosis. No aortic regurgitation. Pulmonic Valve   Normal pulmonic valve structure and function. Pericardial Effusion   No evidence for hemodynamically significant pericardial effusion. Aorta   Normal aortic root. Miscellaneous   The inferior vena cava diameter is normal with normal respiratory   variation. Conclusions      Summary   No significant valvular abnormalities. Normal left ventricle size. Ejection fraction is visually estimated at 55-60%.    Mild bi-atrial enlargement   Atrial fibrillation      Signature      ----------------------------------------------------------------   Electronically signed by Buffy Barajas MD(Interpreting   physician) on 10/11/2022 11:43 AM   ----------------------------------------------------------------     M-Mode/2D Measurements & Calculations      LV Diastolic    LV Systolic Dimension: 3.4   AV Cusp Separation: 1.7 cmLA   Dimension: 4.8  cm                           Dimension: 4.1 cmAO Root   cm              LV Volume Diastolic: 039.2   Dimension: 3 cm   LV FS:29.2 %    ml   LV PW           LV Volume Systolic: 37.5 ml   Diastolic: 0.7  LV EDV/LV EDV Index: 109.5   cm              ml/53 ml/m^2LV ESV/LV ESV    RV Diastolic Dimension: 3.1   Septum          Index: 46.1 ml/22ml/ m^2     cm   Diastolic: 1 cm EF Calculated: 57.9 %                   LV Mass Index: 67 l/min*m^2  Ascending Aorta: 3.1 cm   LV Mass: 137.08                              LA volume/Index: 83.5 ml   g                                            /40.75ml/m^2                   LVOT: 2.1 cm RA Area: 21.6 cm^2                                                   IVC Expiration: 2.1 cm      Stress Test 11/15/2021:                                                                 Exercise ECG:   The patient demonstrated rare PVC during exercise. Occassional PVC's at rest.     With exercise, 1 mm downsloping ST depression in the inferior leads and 1 mm horizontal ST depression in the anterolateral leads. With exercise up to 0.5 mm mm of downsloping ST depression was noted in leads I, II, III, AVF,: And a 0.5 mm horizontal ST depression in V4, V5 and V6 with initial changes beginning at 5 minutes into exercise, at a heart rate of 138. These changes resolved shortly during recovery. The predictive value for ischemia was low . Shetty treadmill score was 6 implying low  risk. IMAGING: Myocardial perfusion imaging was performed at rest 30-35 minutes following the intravenous injection of 10.4 mCi of (Tc-Sestamibi) followed by 10 ml of Normal Saline. At peak exercise, the patient was injected intravenously with 33.3 mCi of (Tc-Sestamibi) followed by 10 ml of Normal Saline. Gated post-stress tomographic imaging was performed 20-25 minutes after stress. FINDINGS: The overall quality of the study was good. Left ventricular cavity size was noted to be normal.     Rotational analog analysis demonstrated soft tissue diaphragmatic attenuation. The gated SPECT stress imaging in the short, vertical long, and horizontal long axis demonstrated normal homogeneous tracer distribution throughout the myocardium. With left ventricular ejection fraction of 75% and no wall motion abnormalities. Impression:    Exercise EKG was negative. The patient experienced no chest pain with exercise. The myocardial perfusion imaging was normal.    Overall left ventricular systolic function was normal without regional wall motion abnormalities.   Shetty treadmill score was 6 implying low risk. Exercise capacity was average. Low risk general exercise treadmill test.    Outpatient Monitor: Pending    I have independently reviewed all of the ECGs and rhythm strips per above     Assessment/Plan:     1. Longstanding Persistent Atrial Fibrillation   - First noted 2011 per the patient reports   - First documented 2015  - CHADS-VASc 0  - No OAC only ASA   - No evidence of sinus since 2015  - Rate Control Atenolol 100mg Nightly. - Rhythm control no prior AAD, DCCV, or ablation.   - Now with elevated rates in the 120 in the setting of acute diverticulitis     2. Diverticulitis  - Suspected to have Microperforation on CT 10/24/2022  - Ongoing abdominal pain. - Placed on IV antibiotics and general surgery has been consulted. 3. GERD  - Prilosec    4. Hyperlipdemia   - Statin     5. Tubular adenoma  - Found on colonoscopy 2017    6. Cervical radiculopathy   -Mobic, Trazodone     Recommendations:  Latent rate control when admitted with acute infection, accept HR less than 130 bpm.   Continue Atenolol 100mg Nightly. Given prior discusses regarding hypertension on 10/04/2022 can consider stopping ASA and starting Eliquis 5mg BID, CHADS-VASc 1 this should be done as an outpatient. Wean off cardizem as able. Will follow    I have spent a total of 10 minutes with the patient and the family reviewing the above stated recommendations. And a total of >50% of that time involved face-to-face time providing counseling and or coordination of care with the other providers. Thank you for allowing me to participate in your patient's care. Please call me if there are any questions or concerns. Discussed with Dr. Ashlee Koch.    GULSHAN Horvath - CNP  Cardiac Electrophysiology  [facility]  [facility] [place] [place] [facility]  4:09 PM  10/24/2022    ______________________________________________________________________    I have personally seen and evaluated the patient. I personally obtained the history and performed the physical exam. I personally reviewed all of the above labs and data. All of the assessments and recommendations are from me. I have reviewed the above documentation completed by the LD. Please see my additional contributions to the HPI, physical exam, and assessment, and recommendations below. History of Present Illness: The patient is a 58year-old gentleman with significant past medical history of long standing persistent atrial fibrillation, hyperlipidemia and GERD who presented to the hospital complaining of abdominal pain since 10/21/22. He describes pain across lower abdomen associated with nausea and diarrhea. In ED he was noted to be in AF with RVR at 120 bpm. He was started on IV Cardizem. He underwent CT abdomen which showed acute diverticulitis with extensive surrounding stranding, fluid and extraluminal air. General surgery was consulted and he was started on IV antibiotics. He was recently seen by Dr. Analy Van on 10/6/22 and Atenolol dose was increased to 100 mg daily. Echocardiogram on 10/11/22 showed LV EF of 55-60%. The patient reports occasional palpitations. He denies chest pain, dyspnea on exertion, lightheadedness, dizziness, syncope, PND, or orthopnea. ROS:   Constitutional: Negative for fever. Positive for activity change and appetite change. HENT: Negative for epistaxis. Eyes: Negative for diploplia, blurred vision. Respiratory: Negative for cough, chest tightness, shortness of breath and wheezing. Cardiovascular: pertinent positives in HPI  Gastrointestinal: Positive for for abdominal pain and negative for blood in stool.    All other review of systems are negative     PHYSICAL EXAM:   Vitals:    10/24/22 1615 10/24/22 1630 10/24/22 1645 10/24/22 1700   BP: (!) 133/106 (!) 132/109 (!) 140/102 (!) 119/93   Pulse: 95 (!) 104 94 96   Resp: 24 25 19 24   Temp:       SpO2: 94% 93% 95% 94%   Weight: Constitutional: Well-developed, no acute distress  Eyes: conjunctivae normal, no xanthelasma   Ears, Nose, Throat: oral mucosa moist, no cyanosis   CV: no JVD. IRIR. No murmurs, rubs, or gallops. PMI is nondisplaced  Lungs: clear to auscultation bilaterally, normal respiratory effort without used of accessory muscles  Abdomen: soft, tender at lower abdomen, bowel sounds present, no masses or hepatomegaly   Musculoskeletal: no digital clubbing, no edema   Skin: warm, no rashes     EKG 10/24/22:  bpm, Qtc 472 ms. Echocardiogram 10/11/22: Findings      Left Ventricle   Normal left ventricle size. Normal left ventricle wall thickness. No wall motion abnormalities. Indeterminate diastolic function. Ejection fraction is visually estimated at 55-60%. Right Ventricle   Normal right ventricle structure and function. Left Atrium   The left atrium is mildly dilated by LISA. Right Atrium   Mildly enlarged right atrium size. Mitral Valve   Physiologic and/or trace mitral regurgitation is present. Tricuspid Valve   Normal tricuspid valve structure and function. RVSP is 35 mmHg. Aortic Valve   Trileaflet aortic valve. Normal leaflet mobility. No aortic stenosis. No aortic regurgitation. Pulmonic Valve   Normal pulmonic valve structure and function. Pericardial Effusion   No evidence for hemodynamically significant pericardial effusion. Aorta   Normal aortic root. Miscellaneous   The inferior vena cava diameter is normal with normal respiratory   variation. Conclusions      Summary   No significant valvular abnormalities. Normal left ventricle size. Ejection fraction is visually estimated at 55-60%.    Mild bi-atrial enlargement   Atrial fibrillation      Signature      ----------------------------------------------------------------   Electronically signed by Jesus Burns MD(Interpreting   physician) on 10/11/2022 11:43 AM ----------------------------------------------------------------    Telemetry 10/24/22 : AF  bpm    Stress Test 11/15/2021:                                                              Exercise ECG:   The patient demonstrated rare PVC during exercise. Occassional PVC's at rest.     With exercise, 1 mm downsloping ST depression in the inferior leads and 1 mm horizontal ST depression in the anterolateral leads. With exercise up to 0.5 mm mm of downsloping ST depression was noted in leads I, II, III, AVF,: And a 0.5 mm horizontal ST depression in V4, V5 and V6 with initial changes beginning at 5 minutes into exercise, at a heart rate of 138. These changes resolved shortly during recovery. The predictive value for ischemia was low . Shetty treadmill score was 6 implying low  risk. IMAGING: Myocardial perfusion imaging was performed at rest 30-35 minutes following the intravenous injection of 10.4 mCi of (Tc-Sestamibi) followed by 10 ml of Normal Saline. At peak exercise, the patient was injected intravenously with 33.3 mCi of (Tc-Sestamibi) followed by 10 ml of Normal Saline. Gated post-stress tomographic imaging was performed 20-25 minutes after stress. FINDINGS: The overall quality of the study was good. Left ventricular cavity size was noted to be normal.     Rotational analog analysis demonstrated soft tissue diaphragmatic attenuation. The gated SPECT stress imaging in the short, vertical long, and horizontal long axis demonstrated normal homogeneous tracer distribution throughout the myocardium. With left ventricular ejection fraction of 75% and no wall motion abnormalities. Impression:    Exercise EKG was negative. The patient experienced no chest pain with exercise. The myocardial perfusion imaging was normal.    Overall left ventricular systolic function was normal without regional wall motion abnormalities.   Shetty treadmill score was 6 implying low risk.    Exercise capacity was average. Low risk general exercise treadmill test.    Outpatient Monitor: Pending    Assessment/Plan:    1. Longstanding persistent atrial fibrillation   - First noted 2011 per the patient reported  - First documented 2015 on available EKG  - CHADS-VASc of 0 on ASA  - On Atenolol 100 mg Nightly for rate control.   - No history of rhythm control with AAD, DCCV or ablation.   - Presented in AF with RVR in the setting of acute diverticulitis     2. Acute diverticulitis  - Diagnosed on CT abdomen 10/24/2022  - Possible microperforation.  - On IV Flagyl and Rocephin.  - Follows by general surgery . 3. GERD  - On Prilosec    4. Hyperlipdemia   - On Crestor at home    5. Tubular adenoma  - Found on colonoscopy in 2017    6. Cervical radiculopathy   - On Mobic and Trazodone     Recommendations:  Continue IV Cardizem drip while NPO. Resume Atenolol 100 mg daily when clinically is able  Consider switch Atenolol to Metoprolol if fails to control heart rate. If he is officially diagnosed with hypertension in the future, he would likely benefit from [address] [place] [address] for stroke risk reduction. I have spent a total of 110 minutes with the patient and the family reviewing the above stated recommendations. And a total of >50% of that time involved face-to-face time providing counseling and or coordination of care with the other providers. Thank you for allowing me to participate in your patient's care. Please call me if there are any questions or concerns.      Katlyn Blevins MD  Cardiac Electrophysiology  [facility]  [facility] [place]: [place] Electrophysiology  5:14 PM  10/24/2022

## 2022-10-24 NOTE — H&P
[facility]  [facility]  History and Physical    Patient:  Tere Love 58 y.o. male MRN: [id number]     Date of Service: 10/24/2022    Hospital Day: 1      Chief complaint: had concerns including Abdominal Pain (Lower abdomen for 3 days ) and Diarrhea (Since Friday/). History of Present Illness   The patient is a 58 y.o. male with a PMHx of Afib on ASA 81, HLD on Crestor 40mg, GERD, anxiety, diverticulosis, tubular adenoma found on last colonoscopy in 2017. Since Friday night, patient has been having abdominal pain. It is sharp, localized on the lower abdomen and radiated nowhere else. The pain has been waxing and waning. He had nausea, vomiting and diarrhea associated with it. He had multiple bouts of watery brown diarrhea. He denies blood or mucus in the stool. Vomitus was just once, and of clear vomitus. He also had decreased appetite since abdominal pain started. He said that he couldn't keep much down, having very minimal food. He stopped taking his Meloxicam because of that. This morning, he woke up with lightheadedness. He said he felt his heart palpitations as well. Patient denies any headache and visual disturbances. Patient states he is compliant with his medications. Patient follows with Dr. Britney Bee, and called her a few days ago to confirm the ECHO results. He had an ECHO done on 10/11/2022 after he had severe pain in the left shoulder and arm with pins and needles in the first two fingers on an office visit with Dr. Christina Estrada. Shoulder pain is possibly due to cervical radiculopathy. Patient states it is much better now and he has PT on Wednesday. On admission patient had a HR of 120 and found to be in Afib RVR. Patient was given IVF and diltiazem drip to control HR. He is started on IV Rocephin and Metronidazole after CT showed acute diverticulitis with mircoperforation. He had leukocytosis of 15.9, lactic acid of 3.5.  Na was 127, AG 18, bilirubin 1.5. ECHO results showed Ef of 55-60% and mild atrial enlargement. Stress test from Nov 2021 showed low risk. Medications   fentaNYL (SUBLIMAZE) injection 50 mcg (50 mcg IntraVENous Not Given 10/24/22 0857)   sodium chloride flush 0.9 % injection 10 mL (has no administration in time range)   dilTIAZem injection 15 mg (15 mg IntraVENous Given 10/24/22 1258)     Followed by   dilTIAZem 100 mg in dextrose 5 % 100 mL infusion (ADD-Willard) (has no administration in time range)   magnesium sulfate 2000 mg in 50 mL IVPB premix (has no administration in time range)   0.9 % sodium chloride bolus (0 mLs IntraVENous Stopped 10/24/22 0956)   ondansetron (ZOFRAN) injection 4 mg (4 mg IntraVENous Given 10/24/22 0856)   iopamidol (ISOVUE-370) 76 % injection 75 mL (75 mLs IntraVENous Given 10/24/22 0958)   cefTRIAXone (ROCEPHIN) 1,000 mg in sterile water 10 mL IV syringe (1,000 mg IntraVENous Given 10/24/22 1226)   metronidazole (FLAGYL) 500 mg in 0.9% NaCl 100 mL IVPB premix (0 mg IntraVENous Stopped 10/24/22 1331)   0.9 % sodium chloride bolus (0 mLs IntraVENous Stopped 10/24/22 1331)         Past Medical History:      Diagnosis Date    Anticoagulant long-term use     Anxiety     Atrial fibrillation (HCC)     GERD (gastroesophageal reflux disease)     Hyperlipidemia     Insomnia     Paroxysmal atrial fibrillation [facility])        Past Surgical History:        Procedure Laterality Date    APPENDECTOMY      CATARACT REMOVAL  2006    bilateral    COLONOSCOPY  01/2010    COLONOSCOPY  04/14/2017    COLONOSCOPY N/A 8/5/2022    COLONOSCOPY WITH BIOPSY performed by Jessica Li MD at [address] [place] [address] W/ BIOPSIES  08/05/2022    polyp; diveriticula--jose daniel    ENDOSCOPY, COLON, DIAGNOSTIC      EYE SURGERY  2006    bilateral cataract    TONSILLECTOMY      UPPER GASTROINTESTINAL ENDOSCOPY  2007    VASECTOMY         Medications Prior to Admission:    Prior to Admission medications    Medication Sig Start Date End Date Taking? Authorizing Provider   atenolol (TENORMIN) 100 MG tablet Take 1 tablet by mouth daily 10/21/22   Jonathan Roberson MD   meloxicam (MOBIC) 15 MG tablet Take 1 tablet by mouth daily 10/18/22   Zoey Weldon DO   omeprazole (PRILOSEC) 40 MG delayed release capsule Take 1 capsule by mouth Daily 10/4/22   Nelly Izquierdo MD   rosuvastatin (CRESTOR) 40 MG tablet Take 1 tablet by mouth daily 8/29/22   Nelly Izquierdo MD   traZODone (DESYREL) 50 MG tablet Take 1 tablet by mouth nightly 8/29/22   Nelly Izquierdo MD   aspirin 81 MG EC tablet Take 81 mg by mouth daily    Historical Provider, MD   melatonin 3 MG TABS tablet Take 2 tablets by mouth daily 1/16/18   Nelly Izquierdo MD       Allergies:  Patient has no known allergies. Social History:   TOBACCO:   reports that he has never smoked. He has never used smokeless tobacco.  ETOH:   reports current alcohol use of about 1.0 - 2.0 standard drink per week. Family History:       Problem Relation Age of Onset    Diabetes Mother         pre- daibetic    Hypertension Father     High Blood Pressure Father     High Cholesterol Father     Obesity Father     Colon Cancer Father     Cancer Sister     Cancer Paternal Grandmother         68    Stroke Paternal Grandfather        REVIEW OF SYSTEMS:    Constitutional:  Positive for chills and diaphoresis. Negative for fever. HENT:  Negative for congestion, rhinorrhea and trouble swallowing. Eyes:  Negative for visual disturbance. Respiratory:  Negative for cough and shortness of breath. Cardiovascular:  Negative for chest pain and leg swelling. Gastrointestinal:  Positive for abdominal pain, diarrhea, nausea and vomiting. Negative for blood in stool. Genitourinary:  Negative for decreased urine volume, difficulty urinating, dysuria, frequency, hematuria and urgency. Musculoskeletal:  Negative for myalgias, neck pain and neck stiffness.  Positive for shoulder pain on left  Skin:  Negative for color change. Neurological:  Positive for light-headedness. Negative for dizziness, syncope, weakness, numbness and headaches. Positive for parasthesia of 2nd and 3rd finger on the left. Physical Exam   Vitals: /64   Pulse (!) 103   Temp 98.7 °F (37.1 °C)   Resp 22   Wt 185 lb (83.9 kg)   SpO2 95%   BMI 25.09 kg/m²     Physical Exam  Constitutional:       Comments: Some distress   Cardiovascular:      Rate and Rhythm: Tachycardia present. Rhythm irregularly irregular. Heart sounds: No murmur heard. Comments: 100bpm  Pulmonary:      Effort: Pulmonary effort is normal.      Breath sounds: Normal breath sounds. No wheezing. Abdominal:      Tenderness: There is abdominal tenderness (Tenderness prersent in the RLQ, suprapubic and LLQ region. TTP across those regions and also the epigastric region. ). There is guarding (There is some guarding over the epigastrium. ). Comments: Otherwise flat. No bowel sounds appreciated   Genitourinary:     Comments: deferred  Musculoskeletal:         General: No swelling. Neurological:      General: No focal deficit present. Mental Status: He is oriented to person, place, and time. Psychiatric:         Mood and Affect: Mood normal.       Labs and Imaging Studies   Basic Labs  CBC:   Recent Labs     10/24/22  0800   WBC 15.9*   RBC 5.43   HGB 16.1   HCT 46.7   MCV 86.0   MCH 29.7   MCHC 34.5   RDW 13.5      MPV 9.9       BMP:    Recent Labs     10/24/22  0800   *   K 3.9   CL 88*   CO2 21*   BUN 10   CREATININE 1.2   GLUCOSE 195*   CALCIUM 9.4   PROT 7.3   LABALBU 3.9   BILITOT 1.5*   ALKPHOS 55   AST 14   ALT 13       LIVER PROFILE:   Recent Labs     10/24/22  0800   AST 14   ALT 13   LIPASE 13   BILITOT 1.5*   ALKPHOS 55       PT/INR:   No results for input(s): PROTIME, INR in the last 72 hours. APTT:   No results for input(s): APTT in the last 72 hours.     Fasting Lipid Panel:    Lab Results   Component Value Date/Time    CHOL 177 12/18/2020 12:00 PM    TRIG 60 12/18/2020 12:00 PM    HDL 51 12/18/2020 12:00 PM       Cardiac Enzymes:    No results found for: CKTOTAL, CKMB, CKMBINDEX, TROPONINI    Imaging Studies:     XR CERVICAL SPINE (4-5 VIEWS)    Result Date: 10/5/2022  EXAMINATION: 4 XRAY VIEWS OF THE CERVICAL SPINE 10/4/2022 11:33 am COMPARISON: None. HISTORY: ORDERING SYSTEM PROVIDED HISTORY: Cervical radiculopathy FINDINGS: No fracture or dislocation. Degenerative facet arthropathy is moderate to severe at multiple levels on the right and somewhat milder at multiple levels on the left. Degenerative disc disease is moderate at C6-7 and milder elsewhere. Normal soft tissues. Degenerative cervical spondylosis. XR SHOULDER LEFT (MIN 2 VIEWS)    Result Date: 9/28/2022  EXAMINATION: THREE XRAY VIEWS OF THE LEFT SHOULDER 9/28/2022 2:33 pm COMPARISON: None. HISTORY: ORDERING SYSTEM PROVIDED HISTORY: Impingement syndrome of left shoulder TECHNOLOGIST PROVIDED HISTORY: Reason for exam:->pain FINDINGS: Glenohumeral joint is normally aligned. No evidence of acute fracture or dislocation. No abnormal periarticular calcifications. The [facility] joint is unremarkable in appearance. Visualized lung is unremarkable. No acute abnormality. XR CHEST PORTABLE    Result Date: 10/24/2022  EXAMINATION: ONE XRAY VIEW OF THE CHEST 10/24/2022 8:44 am COMPARISON: None. HISTORY: ORDERING SYSTEM PROVIDED HISTORY: tachy TECHNOLOGIST PROVIDED HISTORY: Reason for exam:->tachy What reading provider will be dictating this exam?->CRC FINDINGS: Single AP upright portable chest demonstrate satisfactory expansion lungs which are clear. The cardiac silhouette appears unremarkable. There is no evidence of a pneumothorax. No acute cardiopulmonary process.      CTA ABDOMEN PELVIS W CONTRAST    Result Date: 10/24/2022  EXAMINATION: CTA OF THE ABDOMEN AND PELVIS WITH CONTRAST 10/24/2022 9:58 am: TECHNIQUE: CTA of the abdomen and pelvis was performed with the administration of intravenous contrast. Multiplanar reformatted images are provided for review. MIP images are provided for review. Automated exposure control, iterative reconstruction, and/or weight based adjustment of the mA/kV was utilized to reduce the radiation dose to as low as reasonably achievable. COMPARISON: None. HISTORY: ORDERING SYSTEM PROVIDED HISTORY: afib, RLQ, periumbilical, and LUQ abdominal pain out of proportion to exam, r/o mesenteric ischemia TECHNOLOGIST PROVIDED HISTORY: Reason for exam:->afib, RLQ, periumbilical, and LUQ abdominal pain out of proportion to exam, r/o mesenteric ischemia Decision Support Exception - unselect if not a suspected or confirmed emergency medical condition->Emergency Medical Condition (MA) What reading provider will be dictating this exam?->CRC FINDINGS: CTA ABDOMEN: The lung bases are grossly clear with atelectatic changes seen in dependent portion lung bases bilaterally. The liver is homogeneous in appearance. The spleen is unremarkable. No stones in the gallbladder. The pancreas is homogeneous in appearance. Both adrenal glands are within normal limits. Symmetric enhancement of the renal parenchyma. No stones or distension seen in the renal collecting system. The stomach is distended with fluid and air-fluid level identified. There is dilated proximal and mid small bowel loops. There is decompression of the distal small bowel loops. The mid small both bowel loops lie adjacent to inflammatory changes within the left lower quadrant likely from a diverticulitis and inflammation. Findings concerning for an ileus. There is no significant wall thickening identified the small bowel. There is good enhancement identified of the mucosa of the small bowel. There is abnormal wall thickening of a short segment of sigmoid colon with multiple diverticulum identified and surrounding stranding to suggest a complicated acute sigmoid diverticulitis.   There is extraluminal air to suggest perforation with some fluid. No abdominal retroperitoneal lymphadenopathy. The abdominal aorta reveals minimal atherosclerotic disease. Patency identified of the celiac axis as well as the SMA. No thrombus or filling defect within the mesenteric vessels. There is mild atherosclerotic disease of the renal vessels with a single renal artery identified bilaterally. The CHELA is patent. Minimal atherosclerotic disease of the iliac vessels. Minimal fluid identified in the pericolic gutter on the right and left. No ventral abdominal wall mass or defect. Degenerative changes seen within the spine and pelvis. CTA PELVIS: Pelvic organs reveal mild wall thickening of the bladder likely related to the surrounding inflammatory changes. The prostate is unremarkable. Degenerative changes seen within the spine and pelvis. No ventral abdominal wall mass or defect. Abnormal wall thickening identified of the sigmoid colon to suggest and acute sigmoid diverticulitis with extensive surrounding stranding and fluid without loculation at this time. There is extraluminal air to suggest a perforation. There is adjacent wall thickening of the small bowel and bladder from the surrounding inflammatory changes with dilatation of the small bowel proximally as well as dilatation of the stomach. Mild ileus cannot be excluded. Minimal vascular atherosclerotic disease. No evidence of significant stenosis or thrombus within the vessels. Findings were discussed with Dr. Amy Parks at 11:27 a.m. on 10/24/2022       Resident's Assessment and Plan     Sulema Taylor is a 58 y.o. male    Principal Problem:    Acute diverticulitis  Resolved Problems:    * No resolved hospital problems.  *    Acute diverticulitis  -WBC 15.9, lactic acid 3.5, Na 127, Cl 88 on admission  -CT showed acute diverticulitis with possible microperforation, Rocephin x1 and Metronidazole x1 given in the ED  -continue Rocephin 1g daily and Metronidazole 500mg q8h starting at 2000  -consulted General Surgery from ED  -NPO; hold all PO meds until nausea decreases  -pain management morphine 2mg q4h PRN  -started NS @ maintenance rate since pt has had minimal food intake  -continue ASA 81 as patient is not on any other anticoagulation for paroxysmal Afib  -BMP and CBC in the AM    Afib w RVR  -pt had paroxysmal Afib diagnosed for 11 years, last ECHO done on 10/11/2022 showing EF 55-60% and mild atrial enlargement  -HR at 120 at admission, only controlled with diltiazem drip  -continue diltiazem drip  -consulted EP as pt is known to Dr Shreya Velázquez  -placed on telemetry  -continue ASA 81 as patient is not on any other anticoagulation for paroxysmal Afib    Hyponatremia  -possibly due to low PO intake since Friday, 2 boluses of IV NS given in the ED  -started on IV NS @ maintenance rate  -recheck BMP and Mg    Lactic acidosis - resolved  -lactic acid 3.5 on admission, normalized to 1.5 after IVF and IV ATB  -monitor for signs of infection    Elevated troponin  -troponin at 12 on admission, possibly due to Afib  -last stress test was in Nov 2021, showed low risk of ACS  -repeat troponin levels in AM  -consider consulting cardiology if not improving    Elevated bilirubin  -possibly due to low PO intake and dehydration  -will continue to monitor    Hx of HLD  -hold Crestor 40mg d/t not tolerating PO meds    Hx of GERD  -hold Protonix 40mg d/t not tolerating PO meds    Hx of Insomnia  -hold trazodone and melatonin d/t not tolerating PO meds    Hx of cervical radiculopathy, left arm weakness  -hold Meloxicam  -morphine for pain control    11.  Hx of DMT2 on diet control  -elevated glucose to 195 at admission  -HbA1C at 6.0 in 5/2022  -Recheck HbA1C tomorrow AM    PT/OT evaluation: none  DVT prophylaxis: Lovenox 40mg daily  GI prophylaxis: Protonix 40mg daily - held for now  Diet: NPO  Code Full  Disposition: home      Electronically signed by Satnam Mclean MD on 10/24/2022 at 2:30 PM  Attending physician: Dr. Perri Hammer

## 2022-10-24 NOTE — Clinical Note
Discharge Plan[de-identified] Other/[facility])   Telemetry/Cardiac Monitoring Required?: Yes

## 2022-10-24 NOTE — ED PROVIDER NOTES
Procedure Laterality Date    APPENDECTOMY      CATARACT REMOVAL  2006    bilateral    COLONOSCOPY  01/2010    COLONOSCOPY  04/14/2017    COLONOSCOPY N/A 8/5/2022    COLONOSCOPY WITH BIOPSY performed by Jo Ann Gold MD at [facility]  08/05/2022    polyp; diveriticula--jose daniel    ENDOSCOPY, COLON, DIAGNOSTIC      EYE SURGERY  2006    bilateral cataract    TONSILLECTOMY      UPPER GASTROINTESTINAL ENDOSCOPY  2007    VASECTOMY         Social History:   Social History     Socioeconomic History    Marital status:      Spouse name: None    Number of children: None    Years of education: None    Highest education level: None   Tobacco Use    Smoking status: Never    Smokeless tobacco: Never   Vaping Use    Vaping Use: Never used   Substance and Sexual Activity    Alcohol use: Yes     Alcohol/week: 1.0 - 2.0 standard drink     Types: 1 - 2 Cans of beer per week    Drug use: Never    Sexual activity: Yes   Social History Narrative    Drinks 5-6 cups of coffee daily     Social Determinants of Health     Financial Resource Strain: Low Risk     Difficulty of Paying Living Expenses: Not hard at all   Food Insecurity: No Food Insecurity    Worried About Running Out of Food in the Last Year: Never true    Ran Out of Food in the Last Year: Never true   Transportation Needs: No Transportation Needs    Lack of Transportation (Medical): No    Lack of Transportation (Non-Medical): No   Physical Activity: Sufficiently Active    Days of Exercise per Week: 6 days    Minutes of Exercise per Session: 30 min   Stress: No Stress Concern Present    Feeling of Stress : Only a little   Social Connections:  Moderately Integrated    Frequency of Communication with Friends and Family: Once a week    Frequency of Social Gatherings with Friends and Family: More than three times a week    Attends [employer] Services: More than 4 times per year    Active Member of [employer] or Organizations: No    Attends Club or Organization Meetings: Never    Marital Status:    Intimate Partner Violence: Not At Risk    Fear of Current or Ex-Partner: No    Emotionally Abused: No    Physically Abused: No    Sexually Abused: No   Housing Stability: Low Risk     Unable to Pay for Housing in the Last Year: No    Number of Places Lived in the Last Year: 1    Unstable Housing in the Last Year: No       Family History:   Family History   Problem Relation Age of Onset    Diabetes Mother         pre- daibetic    Hypertension Father     High Blood Pressure Father     High Cholesterol Father     Obesity Father     Colon Cancer Father     Cancer Sister     Cancer Paternal Grandmother         68    Stroke Paternal Grandfather        The patients home medications have been reviewed. Allergies:   No Known Allergies        ---------------------------------------------------PHYSICAL EXAM--------------------------------------    /86   Pulse (!) 120   Temp 98.7 °F (37.1 °C)   Resp 18   Wt 185 lb (83.9 kg)   SpO2 95%   BMI 25.09 kg/m²     Physical Exam  Vitals and nursing note reviewed. Constitutional:       General: He is not in acute distress. Appearance: He is not toxic-appearing. HENT:      Mouth/Throat:      Mouth: Mucous membranes are dry. Eyes:      General: No scleral icterus. Extraocular Movements: Extraocular movements intact. Pupils: Pupils are equal, round, and reactive to light. Neck:      Comments: No JVD  Cardiovascular:      Rate and Rhythm: Tachycardia present. Rhythm irregular. Pulses: Normal pulses. Heart sounds: Normal heart sounds. No murmur heard. Pulmonary:      Effort: Pulmonary effort is normal. No respiratory distress. Breath sounds: Normal breath sounds. No wheezing or rales. Abdominal:      General: There is no distension. Palpations: Abdomen is soft. Tenderness: There is abdominal tenderness (LUQ, periumbilical, RLQ). There is no guarding or rebound. Musculoskeletal:         General: No swelling or tenderness. Normal range of motion. Cervical back: Normal range of motion and neck supple. No rigidity. No muscular tenderness. Skin:     General: Skin is warm and dry. Neurological:      Mental Status: He is alert and oriented to person, place, and time. Comments: Strength 5/5 and sensation grossly intact to light touch and equal bilaterally throughout all extremities          -------------------------------------------------- RESULTS -------------------------------------------------  I have personally reviewed all laboratory and imaging results for this patient. Results are listed below.      LABS:  Labs Reviewed   TROPONIN - Abnormal; Notable for the following components:       Result Value    Troponin, High Sensitivity 12 (*)     All other components within normal limits   CBC WITH AUTO DIFFERENTIAL - Abnormal; Notable for the following components:    WBC 15.9 (*)     Neutrophils % 93.9 (*)     Lymphocytes % 1.9 (*)     Neutrophils Absolute 14.95 (*)     Lymphocytes Absolute 0.30 (*)     Eosinophils Absolute 0.01 (*)     All other components within normal limits   COMPREHENSIVE METABOLIC PANEL - Abnormal; Notable for the following components:    Sodium 127 (*)     Chloride 88 (*)     CO2 21 (*)     Anion Gap 18 (*)     Glucose 195 (*)     Total Bilirubin 1.5 (*)     All other components within normal limits   LACTIC ACID - Abnormal; Notable for the following components:    Lactic Acid 3.5 (*)     All other components within normal limits    Narrative:     CALL  [place]  H34 tel. ,  Chemistry results called to and read back by , 10/24/2022 09:08, by  [facility]   COVID-19, RAPID   CLOSTRIDIUM DIFFICILE EIA   URINALYSIS   LIPASE   MICROSCOPIC URINALYSIS   MAGNESIUM       RADIOLOGY:  Interpreted personally and by Radiologist.  CTA ABDOMEN PELVIS W CONTRAST   Final Result   Abnormal wall thickening identified of the sigmoid colon to suggest and acute sigmoid diverticulitis with extensive surrounding stranding and fluid without   loculation at this time. There is extraluminal air to suggest a perforation. There is adjacent wall thickening of the small bowel and bladder from the   surrounding inflammatory changes with dilatation of the small bowel   proximally as well as dilatation of the stomach. Mild ileus cannot be   excluded. Minimal vascular atherosclerotic disease. No evidence of significant   stenosis or thrombus within the vessels. Findings were discussed with Dr. Khushboo Barajas at 11:27 a.m. on 10/24/2022         XR CHEST PORTABLE   Final Result   No acute cardiopulmonary process. EKG:  This EKG is signed and interpreted by the EP. Afib, vent rate 139bpm, normal axis, no acute injury pattern, nonspecific ST-T abnormality. Significant changes have occurred compared w/ prior EKG      ------------------------- NURSING NOTES AND VITALS REVIEWED ---------------------------   The nursing notes within the ED encounter and vital signs as below have been reviewed by myself. /86   Pulse (!) 120   Temp 98.7 °F (37.1 °C)   Resp 18   Wt 185 lb (83.9 kg)   SpO2 95%   BMI 25.09 kg/m²   Oxygen Saturation Interpretation: Normal    The patients available past medical records and past encounters were reviewed.         ------------------------------ ED COURSE/MEDICAL DECISION MAKING----------------------  Medications   fentaNYL (SUBLIMAZE) injection 50 mcg (50 mcg IntraVENous Not Given 10/24/22 0857)   sodium chloride flush 0.9 % injection 10 mL (has no administration in time range)   cefTRIAXone (ROCEPHIN) 1,000 mg in sterile water 10 mL IV syringe (has no administration in time range)   metronidazole (FLAGYL) 500 mg in 0.9% NaCl 100 mL IVPB premix (has no administration in time range)   0.9 % sodium chloride bolus (has no administration in time range)   dilTIAZem injection 15 mg (has no administration in time range)     Followed by dilTIAZem 100 mg in dextrose 5 % 100 mL infusion ([facility]) (has no administration in time range)   magnesium sulfate 2000 mg in 50 mL IVPB premix (has no administration in time range)   0.9 % sodium chloride bolus (0 mLs IntraVENous Stopped 10/24/22 0956)   ondansetron (ZOFRAN) injection 4 mg (4 mg IntraVENous Given 10/24/22 0856)   iopamidol (ISOVUE-370) 76 % injection 75 mL (75 mLs IntraVENous Given 10/24/22 0958)     Consultations:             General surgery    Critical Care: Please note that the withdrawal or failure to initiate urgent interventions for this patient would likely result in a life threatening deterioration or permanent disability. Accordingly this patient received 30 minutes of critical care time, excluding separately billable procedures. Counseling: The emergency provider has spoken with the patient and discussed todays results, in addition to providing specific details for the plan of care and counseling regarding the diagnosis and prognosis. Questions are answered at this time and they are agreeable with the plan. ED Course/Medical Decision Makin y.o. male here with diffuse lower abdominal pain and diarrhea. On arrival in afib w/ RVR, treated initially w/ IV fluids, no improvement in HR still in 130s. Starting diltiazem bolus and gtt. Workup also notable for lactic acidosis and CT evidence of diverticulitis w/ microperforation and pelvic free fluid. Started IV abx. General surgery consulted, recs pending. [facility].       --------------------------------- IMPRESSION AND DISPOSITION ---------------------------------    IMPRESSION  1. Acute diverticulitis    2. Atrial fibrillation with RVR ([facility])        DISPOSITION  Disposition: Admit to telemetry  Patient condition is stable    NOTE: This report was transcribed using voice recognition software.  Every effort was made to ensure accuracy; however, inadvertent computerized transcription errors may be present    Jaime Keith MD  Attending Emergency Physician         Jaime Keith MD  10/24/22 3608

## 2022-10-25 LAB
ACANTHOCYTES: ABNORMAL
ANION GAP SERPL CALCULATED.3IONS-SCNC: 11 MMOL/L (ref 7–16)
BASOPHILS ABSOLUTE: 0.01 E9/L (ref 0–0.2)
BASOPHILS RELATIVE PERCENT: 0.1 % (ref 0–2)
BUN BLDV-MCNC: 10 MG/DL (ref 6–23)
BURR CELLS: ABNORMAL
C DIFF TOXIN/ANTIGEN: NORMAL
CALCIUM SERPL-MCNC: 8.2 MG/DL (ref 8.6–10.2)
CHLORIDE BLD-SCNC: 98 MMOL/L (ref 98–107)
CO2: 21 MMOL/L (ref 22–29)
CREAT SERPL-MCNC: 0.8 MG/DL (ref 0.7–1.2)
EKG ATRIAL RATE: 394 BPM
EKG Q-T INTERVAL: 356 MS
EKG QRS DURATION: 90 MS
EKG QTC CALCULATION (BAZETT): 472 MS
EKG R AXIS: 25 DEGREES
EKG T AXIS: -21 DEGREES
EKG VENTRICULAR RATE: 106 BPM
EOSINOPHILS ABSOLUTE: 0.12 E9/L (ref 0.05–0.5)
EOSINOPHILS RELATIVE PERCENT: 1.6 % (ref 0–6)
GFR SERPL CREATININE-BSD FRML MDRD: >60 ML/MIN/1.73
GLUCOSE BLD-MCNC: 120 MG/DL (ref 74–99)
HBA1C MFR BLD: 6.2 % (ref 4–5.6)
HCT VFR BLD CALC: 38.3 % (ref 37–54)
HEMOGLOBIN: 13.4 G/DL (ref 12.5–16.5)
IMMATURE GRANULOCYTES #: 0.03 E9/L
IMMATURE GRANULOCYTES %: 0.4 % (ref 0–5)
LACTIC ACID: 1.2 MMOL/L (ref 0.5–2.2)
LYMPHOCYTES ABSOLUTE: 0.22 E9/L (ref 1.5–4)
LYMPHOCYTES RELATIVE PERCENT: 3 % (ref 20–42)
MAGNESIUM: 2.4 MG/DL (ref 1.6–2.6)
MCH RBC QN AUTO: 30.2 PG (ref 26–35)
MCHC RBC AUTO-ENTMCNC: 35 % (ref 32–34.5)
MCV RBC AUTO: 86.3 FL (ref 80–99.9)
MONOCYTES ABSOLUTE: 0.54 E9/L (ref 0.1–0.95)
MONOCYTES RELATIVE PERCENT: 7.4 % (ref 2–12)
NEUTROPHILS ABSOLUTE: 6.38 E9/L (ref 1.8–7.3)
NEUTROPHILS RELATIVE PERCENT: 87.5 % (ref 43–80)
OVALOCYTES: ABNORMAL
PDW BLD-RTO: 13.8 FL (ref 11.5–15)
PLATELET # BLD: 174 E9/L (ref 130–450)
PMV BLD AUTO: 10.4 FL (ref 7–12)
POIKILOCYTES: ABNORMAL
POTASSIUM REFLEX MAGNESIUM: 4.2 MMOL/L (ref 3.5–5)
RBC # BLD: 4.44 E12/L (ref 3.8–5.8)
SODIUM BLD-SCNC: 130 MMOL/L (ref 132–146)
TROPONIN, HIGH SENSITIVITY: 16 NG/L (ref 0–11)
WBC # BLD: 7.3 E9/L (ref 4.5–11.5)

## 2022-10-25 PROCEDURE — 2580000003 HC RX 258

## 2022-10-25 PROCEDURE — 99233 SBSQ HOSP IP/OBS HIGH 50: CPT | Performed by: INTERNAL MEDICINE

## 2022-10-25 PROCEDURE — 80048 BASIC METABOLIC PNL TOTAL CA: CPT

## 2022-10-25 PROCEDURE — 6360000002 HC RX W HCPCS

## 2022-10-25 PROCEDURE — 36415 COLL VENOUS BLD VENIPUNCTURE: CPT

## 2022-10-25 PROCEDURE — 99222 1ST HOSP IP/OBS MODERATE 55: CPT | Performed by: SURGERY

## 2022-10-25 PROCEDURE — 6370000000 HC RX 637 (ALT 250 FOR IP): Performed by: NURSE PRACTITIONER

## 2022-10-25 PROCEDURE — 2500000003 HC RX 250 WO HCPCS

## 2022-10-25 PROCEDURE — 83605 ASSAY OF LACTIC ACID: CPT

## 2022-10-25 PROCEDURE — 6360000002 HC RX W HCPCS: Performed by: STUDENT IN AN ORGANIZED HEALTH CARE EDUCATION/TRAINING PROGRAM

## 2022-10-25 PROCEDURE — 99222 1ST HOSP IP/OBS MODERATE 55: CPT | Performed by: FAMILY MEDICINE

## 2022-10-25 PROCEDURE — 6370000000 HC RX 637 (ALT 250 FOR IP)

## 2022-10-25 PROCEDURE — 84484 ASSAY OF TROPONIN QUANT: CPT

## 2022-10-25 PROCEDURE — 2500000003 HC RX 250 WO HCPCS: Performed by: EMERGENCY MEDICINE

## 2022-10-25 PROCEDURE — 87449 NOS EACH ORGANISM AG IA: CPT

## 2022-10-25 PROCEDURE — 83735 ASSAY OF MAGNESIUM: CPT

## 2022-10-25 PROCEDURE — 2060000000 HC ICU INTERMEDIATE R&B

## 2022-10-25 PROCEDURE — 85025 COMPLETE CBC W/AUTO DIFF WBC: CPT

## 2022-10-25 PROCEDURE — C9113 INJ PANTOPRAZOLE SODIUM, VIA: HCPCS | Performed by: STUDENT IN AN ORGANIZED HEALTH CARE EDUCATION/TRAINING PROGRAM

## 2022-10-25 PROCEDURE — 93010 ELECTROCARDIOGRAM REPORT: CPT | Performed by: INTERNAL MEDICINE

## 2022-10-25 PROCEDURE — 83036 HEMOGLOBIN GLYCOSYLATED A1C: CPT

## 2022-10-25 PROCEDURE — 87324 CLOSTRIDIUM AG IA: CPT

## 2022-10-25 PROCEDURE — 93005 ELECTROCARDIOGRAM TRACING: CPT | Performed by: FAMILY MEDICINE

## 2022-10-25 RX ORDER — MELATONIN 200 MCG
3 TABLET ORAL NIGHTLY
Status: DISCONTINUED | OUTPATIENT
Start: 2022-10-25 | End: 2022-10-25

## 2022-10-25 RX ORDER — METOPROLOL SUCCINATE 50 MG/1
50 TABLET, EXTENDED RELEASE ORAL 2 TIMES DAILY
Status: DISCONTINUED | OUTPATIENT
Start: 2022-10-25 | End: 2022-10-27

## 2022-10-25 RX ORDER — 0.9 % SODIUM CHLORIDE 0.9 %
1000 INTRAVENOUS SOLUTION INTRAVENOUS ONCE
Status: COMPLETED | OUTPATIENT
Start: 2022-10-25 | End: 2022-10-25

## 2022-10-25 RX ADMIN — METRONIDAZOLE 500 MG: 5 INJECTION, SOLUTION INTRAVENOUS at 05:45

## 2022-10-25 RX ADMIN — METOPROLOL SUCCINATE 50 MG: 50 TABLET, EXTENDED RELEASE ORAL at 11:57

## 2022-10-25 RX ADMIN — METRONIDAZOLE 500 MG: 5 INJECTION, SOLUTION INTRAVENOUS at 21:09

## 2022-10-25 RX ADMIN — METOPROLOL SUCCINATE 50 MG: 50 TABLET, EXTENDED RELEASE ORAL at 20:10

## 2022-10-25 RX ADMIN — DEXTROSE MONOHYDRATE 7.5 MG/HR: 50 INJECTION, SOLUTION INTRAVENOUS at 13:12

## 2022-10-25 RX ADMIN — ASPIRIN 81 MG: 81 TABLET, COATED ORAL at 09:38

## 2022-10-25 RX ADMIN — PANTOPRAZOLE SODIUM 40 MG: 40 INJECTION, POWDER, FOR SOLUTION INTRAVENOUS at 09:28

## 2022-10-25 RX ADMIN — TRAZODONE HYDROCHLORIDE 50 MG: 50 TABLET ORAL at 20:10

## 2022-10-25 RX ADMIN — SODIUM CHLORIDE: 9 INJECTION, SOLUTION INTRAVENOUS at 11:57

## 2022-10-25 RX ADMIN — ACETAMINOPHEN 650 MG: 325 TABLET, FILM COATED ORAL at 20:11

## 2022-10-25 RX ADMIN — METRONIDAZOLE 500 MG: 5 INJECTION, SOLUTION INTRAVENOUS at 11:59

## 2022-10-25 RX ADMIN — MELATONIN 3 MG ORAL TABLET 6 MG: 3 TABLET ORAL at 20:10

## 2022-10-25 RX ADMIN — WATER 2000 MG: 1 INJECTION INTRAMUSCULAR; INTRAVENOUS; SUBCUTANEOUS at 09:32

## 2022-10-25 RX ADMIN — Medication 10 ML: at 09:44

## 2022-10-25 RX ADMIN — SODIUM CHLORIDE: 9 INJECTION, SOLUTION INTRAVENOUS at 09:08

## 2022-10-25 RX ADMIN — ENOXAPARIN SODIUM 40 MG: 100 INJECTION SUBCUTANEOUS at 09:37

## 2022-10-25 RX ADMIN — SODIUM CHLORIDE 1000 ML: 9 INJECTION, SOLUTION INTRAVENOUS at 09:51

## 2022-10-25 ASSESSMENT — LIFESTYLE VARIABLES: HOW OFTEN DO YOU HAVE A DRINK CONTAINING ALCOHOL: 2-4 TIMES A MONTH

## 2022-10-25 ASSESSMENT — PAIN SCALES - GENERAL
PAINLEVEL_OUTOF10: 3
PAINLEVEL_OUTOF10: 3
PAINLEVEL_OUTOF10: 0

## 2022-10-25 ASSESSMENT — PAIN - FUNCTIONAL ASSESSMENT
PAIN_FUNCTIONAL_ASSESSMENT: NONE - DENIES PAIN
PAIN_FUNCTIONAL_ASSESSMENT: 0-10

## 2022-10-25 ASSESSMENT — PAIN DESCRIPTION - DESCRIPTORS: DESCRIPTORS: ACHING

## 2022-10-25 ASSESSMENT — PAIN DESCRIPTION - LOCATION
LOCATION: HEAD
LOCATION: ABDOMEN

## 2022-10-25 NOTE — ED NOTES
Patient heart rate sporadic and raising to 156, 148, 130. Staying at 124. Diltiazem drip increased, per verbal order, nad.       Relda Bence, RN  10/25/22 3438

## 2022-10-25 NOTE — ED NOTES
Patient sleeping on cart, vitals stable, nad.  Call light in reach       Vicente Holden RN  10/25/22 5427

## 2022-10-25 NOTE — PROGRESS NOTES
[facility]   Resident Progress Note    S:  Hospital day: 1    Brief Synopsis: Karen Bermudez is a 58 y.o. male with a PMH of Afib on ASA 81, HLD on Crestor 40mg, GERD, anxiety, diverticulosis, tubular adenoma found on last colonoscopy in 2017. The patient presented for abdominal pain with diarrhea. Since Friday night, patient has been having abdominal pain. It is sharp, localized on the lower abdomen and radiated nowhere else. He had multiple bouts of watery brown diarrhea. He denies blood or mucus in the stool. On Monday, he woke up with lightheadedness and heart palpitations. In the ED, pt had a HR of 120 and found to be in Afib RVR. He was started on diltiazem drip. IV Rocephin and Metronidazole were started after CT showed acute diverticulitis with mircoperforation. Overnight his HR spiked with sporadic moments of 150, 140 and 130. Diltiazem drip was increased. This morning pt was evaluated at bedside his HR was 120. Patient was resting comfortably in bed, his abdominal pain decreased substantially without pain control. He had 3-4 other episodes of diarrhea, with the last one being darker than usual. C.diff was not detected.       Cont meds:    dilTIAZem 7.5 mg/hr (10/25/22 1312)    sodium chloride 50 mL/hr at 10/25/22 1157     Scheduled meds:    cefTRIAXone (ROCEPHIN) IV  2,000 mg IntraVENous Q24H    metoprolol succinate  50 mg Oral BID    fentanNYL  50 mcg IntraVENous Once    sodium chloride flush  5-40 mL IntraVENous 2 times per day    aspirin  81 mg Oral Daily    melatonin  6 mg Oral Daily    meloxicam  15 mg Oral Daily    pantoprazole  40 mg Oral QAM AC    rosuvastatin  40 mg Oral Daily    traZODone  50 mg Oral Nightly    metroNIDAZOLE  500 mg IntraVENous Q8H    enoxaparin  40 mg SubCUTAneous Daily     PRN meds: sodium chloride flush, polyethylene glycol, acetaminophen **OR** acetaminophen, trimethobenzamide, morphine     I reviewed the patient's Past Medical and Surgical History, Medications and Allergies. O:  VS: /75   Pulse 86   Temp 97.3 °F (36.3 °C) (Temporal)   Resp 20   Wt 185 lb (83.9 kg)   SpO2 96%   BMI 25.09 kg/m²     Physical Exam:  Physical Exam  Constitutional:       General: He is not in acute distress. Cardiovascular:      Rate and Rhythm: Tachycardia present. Rhythm irregular. Heart sounds: No murmur heard. Pulmonary:      Effort: Pulmonary effort is normal.      Breath sounds: Normal breath sounds. Abdominal:      General: There is no distension. Palpations: Abdomen is soft. Tenderness: There is abdominal tenderness (mild tenderness in the LLQ). Comments: No bowel sounds appreciated   Musculoskeletal:      Right lower leg: No edema. Left lower leg: No edema. Skin:     General: Skin is warm and dry. Neurological:      General: No focal deficit present. Mental Status: He is alert and oriented to person, place, and time. Labs:  Na/K/Cl/CO2:  130/4.2/98/21 (10/25 0530)  BUN/Cr/glu/ALT/AST/amyl/lip:  10/0.8/--/--/--/--/-- (10/25 0530)  WBC/Hgb/Hct/Plts:  7.3/13.4/38.3/174 (10/25 0530)  estimated creatinine clearance is 105 mL/min (based on SCr of 0.8 mg/dL). Other pertinent labs as noted below    New Imaging:  CTA ABDOMEN PELVIS W CONTRAST   Final Result   Abnormal wall thickening identified of the sigmoid colon to suggest and acute   sigmoid diverticulitis with extensive surrounding stranding and fluid without   loculation at this time. There is extraluminal air to suggest a perforation. There is adjacent wall thickening of the small bowel and bladder from the   surrounding inflammatory changes with dilatation of the small bowel   proximally as well as dilatation of the stomach. Mild ileus cannot be   excluded. Minimal vascular atherosclerotic disease. No evidence of significant   stenosis or thrombus within the vessels.       Findings were discussed with Dr. Millie Delatorre at 11:27 a.m. on 10/24/2022         XR CHEST PORTABLE   Final Result   No acute cardiopulmonary process. A/P:  Principal Problem:    Acute diverticulitis  Active Problems:    Atrial fibrillation with rapid ventricular response (HCC)    Paroxysmal atrial fibrillation (HCC)  Resolved Problems:    * No resolved hospital problems.  *      Acute diverticulitis  -WBC 15.9, lactic acid 3.5, Na 127, Cl 88 on admission  -CT showed acute diverticulitis with possible microperforation, Rocephin x1 and Metronidazole x1 given in the ED  -continue Rocephin 1g daily and Metronidazole 500mg q8h starting at 2000  -Gen Surg recommend advancing diet, decreasing IVF  -full liquid diet, advance as tolerated  -pain management morphine 2mg q4h PRN  -bolus of NS given as patient still has some signs of dehydration  -continue home meds  -BMP and CBC daily     Afib w RVR  -pt had paroxysmal Afib diagnosed for 11 years, last ECHO done on 10/11/2022 showing EF 55-60% and mild atrial enlargement  -HR at 120 at admission, only controlled with diltiazem drip  -continue diltiazem drip, wean as tolerated  -consulted EP as pt is known to Dr Marilyn Cha  -placed on telemetry  -continue ASA 81, start on metoprolol 50mg BID     Hyponatremia  -possibly due to low PO intake since Friday, 2 boluses of IV NS given in the ED  -Na 130 today, bolus of NS given, on maintenance fluid as well  -BMP daily     Lactic acidosis - resolved  -lactic acid 3.5 on admission, normalized to 1.5 after IVF and IV ATB  -monitor for signs of infection     Elevated troponin - resolved  -troponin at 12 on admission, possibly due to Afib  -last stress test was in Nov 2021, showed low risk of ACS  -consider consulting cardiology if not improving  -repeat troponin at 9     Elevated bilirubin  -possibly due to low PO intake and dehydration  -will continue to monitor     Hx of HLD  -continue Crestor 40mg     Hx of GERD  -continue PO Protonix 40mg     Hx of Insomnia  -continue home trazodone and melatonin     Hx of cervical radiculopathy, left arm weakness  -continue Meloxicam  -morphine PRN for pain control     11.  Hx of DMT2 on diet control  -elevated glucose to 195 at admission  -HbA1C at 6.0 in 5/2022, rechecked at 6.2  -continue to monitor     PT/OT evaluation: none  DVT prophylaxis: Lovenox 40mg daily  GI prophylaxis: Protonix 40mg daily  Diet: Full liquid diet, advance if tolerated  Code Full  Disposition: home      Electronically signed by Crystal Sullivan MD on 10/25/2022 at 1:22 PM  This case was discussed with attending physician Dr. Devonte Roberto

## 2022-10-25 NOTE — PROGRESS NOTES
GENERAL SURGERY  DAILY PROGRESS NOTE  10/25/2022    CHIEF COMPLAINT:  Chief Complaint   Patient presents with    Abdominal Pain     Lower abdomen for 3 days     Diarrhea     Since Friday         SUBJECTIVE:  Pain improved today, continues to have diarrhea. Denies n/v. Not hungry this am     Wbc improved to 7.3    OBJECTIVE:  /75   Pulse (!) 113   Temp 98.7 °F (37.1 °C)   Resp 29   Wt 185 lb (83.9 kg)   SpO2 93%   BMI 25.09 kg/m²     GENERAL:  NAD. A&Ox3. HEENT PERRL conj pink  LUNGS:  No increased work of breathing. BS clear b/l   CARDIOVASCULAR: tachy irregular, no extra heart sounds   ABDOMEN:  Soft, non-distended, moderate tenderness. No guarding, rigidity, rebound. EXT NVI no edema       ASSESSMENT/PLAN:  58 y.o. male with acute sigmoid diverticulitis with contained perforation,     Clear liquid diet, decrease IVF  Pain control   Continue abx   Nausea control as needed   Chronic afib- ep following, diltiazem increased yest.   Monitor wbc, fever curve   ICS, pulm hygiene       Neha Sales MD  Surgery Resident PGY-2  10/25/2022  7:49 AM        Attending Attestation   Patient seen and examined, agree with resident note except for changes made by me, for remaining HP/Consult/progress note details please see resident HP/Consult/progress note. - I personally reviewed the CT scan,   - acute sigmoid diverticulitis with contained perforation, feeling better today   - long talk about potential for surgery and colostomy, discussed potential progression of disease.  Pt seems to understand.  - at this point will continue with IV abx ok to start diet today, serial abd exam       Negro West MD

## 2022-10-25 NOTE — PROGRESS NOTES
[address] [facility]  Family Medicine Attending    S: 58 y.o. male with acute diverticulitis and afib rvr. He had abdominal pain on admission that is now improving with the addition of antibiotics. His nausea is also improving. He was placed on a cardizem gtt in the ED for the afib. EPS is also following. Patient denies any chest pain but has noticed the palpitations from time to time. He continues to have watery diarrhea. He is in contact isolation for possible cdif at present. Does complain of GERD symptoms. O: VS- Blood pressure 112/69, pulse (!) 101, temperature 97.8 °F (36.6 °C), temperature source Temporal, resp. rate 19, weight 185 lb (83.9 kg), SpO2 95 %. Exam is as noted by resident with the following changes, additions or corrections:  Gen:  AAO  Resp:  CTAB  CVS:  irregularly irregular  Abd:  soft, mildly tender in the LLQ, no rebound or guarding. Impressions:  Acute diverticulitis  Atrial fibrillation with rapid ventricular response (HCC)  Paroxysmal atrial fibrillation (HCC)  Hyponatremia  Dehydration  Gerd    Plan:      Continue abx-rocephin and flagyl  Resume home medications  Fluid bolus given due to RVR as well as persistent low UO/dehydration and hyponatremia  Gen surg consulted  EPS following-patient will be started on metoprolol instead of the atenolol that he was previously on. Protonix for GERD symptoms. Attending Physician Statement  I have reviewed the chart and seen the patient with the resident(s). I personally reviewed images, EKG's and similar tests, if present. I personally reviewed and performed key elements of the history and exam.  I have reviewed and confirmed student and/or resident history and exam with changes as indicated above. I agree with the assessment, plan and orders as documented by the resident. Please refer to the resident and/or student note for additional information. Please also see H&P from Dr. Daphne Austin on 10/24/22.     Rodrigo Rizzo MD

## 2022-10-25 NOTE — PROGRESS NOTES
[address] and Vascular [address] [facility] Electrophysiology  Inpatient Progress  Report  PATIENT: Perfecto Loyd  MEDICAL RECORD NUMBER: [id number]  DATE OF SERVICE:  10/25/2022  ATTENDING ELECTROPHYSIOLOGIST: Sabino Verma MD  PRIMARY ELECTROPHYSIOLOGIST: Damaso Patiño MD   REFERRING PHYSICIAN:  Gene Harris MD  CHIEF COMPLAINT: Abdominal pain    HPI:  The patient is a 58year-old gentleman with significant past medical history of long standing persistent atrial fibrillation, hyperlipidemia and GERD who presented to the hospital complaining of abdominal pain since 10/21/22. He describes pain across lower abdomen associated with nausea and diarrhea. In ED he was noted to be in AF with RVR at 120 bpm. He was started on IV Cardizem. He underwent CT abdomen which showed acute diverticulitis with extensive surrounding stranding, fluid and extraluminal air. General surgery was consulted and he was started on IV antibiotics. He was recently seen by Dr. Todd Turner on 10/6/22 and Atenolol dose was increased to 100 mg daily. Echocardiogram on 10/11/22 showed LV EF of 55-60%. The patient reports occasional palpitations. He denies chest pain, dyspnea on exertion, lightheadedness, dizziness, syncope, PND, or orthopnea. 10/25/2022: The patient is seen in ER, he remains in AF on a Cardizem drip at a low dose with rates around 110 bpm, he has no cardiac complains and notes improved abdominal pain due to being NPO his Atenolol was held and he is willing to change to Toprol.        Patient Active Problem List    Diagnosis Date Noted    Acute diverticulitis 10/24/2022     Priority: Medium    Atrial fibrillation with rapid ventricular response ([address] 10/24/2022     Priority: Medium    Family history of colon cancer 08/05/2022     Priority: Medium    Chronic left shoulder pain 10/05/2018    Anxiety 02/16/2018    Sleep disturbance 02/16/2018    History of colon polyps     Adenomatous polyp of colon Diverticulosis 10/13/2014    Tubular adenoma 10/13/2014    FH: colon cancer 09/12/2013    GERD (gastroesophageal reflux disease) 09/12/2013    Hypogonadism male 09/12/2013    Hyperlipidemia 09/12/2013    Paroxysmal atrial fibrillation ([address] 09/12/2013       Past Medical History:   Diagnosis Date    Anticoagulant long-term use     Anxiety     Atrial fibrillation (HCC)     GERD (gastroesophageal reflux disease)     Hyperlipidemia     Insomnia     Paroxysmal atrial fibrillation (HCC)        Family History   Problem Relation Age of Onset    Diabetes Mother         pre- daibetic    Hypertension Father     High Blood Pressure Father     High Cholesterol Father     Obesity Father     Colon Cancer Father     Cancer Sister     Cancer Paternal Grandmother         68    Stroke Paternal Grandfather        Social History     Tobacco Use    Smoking status: Never    Smokeless tobacco: Never   Substance Use Topics    Alcohol use:  Yes     Alcohol/week: 1.0 - 2.0 standard drink     Types: 1 - 2 Cans of beer per week       Current Facility-Administered Medications   Medication Dose Route Frequency Provider Last Rate Last Admin    cefTRIAXone (ROCEPHIN) 2,000 mg in sterile water 20 mL IV syringe  2,000 mg IntraVENous Q24H Tam Price MD   2,000 mg at 10/25/22 0932    metoprolol succinate (TOPROL XL) extended release tablet 50 mg  50 mg Oral BID GULSHAN Joel - CNP   50 mg at 10/25/22 1157    fentaNYL (SUBLIMAZE) injection 50 mcg  50 mcg IntraVENous Once Isiah Agrawal MD        dilTIAZem 100 mg in dextrose 5 % 100 mL infusion (ADD-Guion)  2.5-15 mg/hr IntraVENous Continuous Isiah Agrawal MD 7 mL/hr at 10/25/22 0710 7 mg/hr at 10/25/22 0710    sodium chloride flush 0.9 % injection 5-40 mL  5-40 mL IntraVENous 2 times per day Simon Grijalva MD   10 mL at 10/25/22 0944    sodium chloride flush 0.9 % injection 5-40 mL  5-40 mL IntraVENous PRN Shwetha Hong MD        polyethylene glycol [facility]) packet 17 g 17 g Oral Daily PRN Shwetha Mcdaniels MD        acetaminophen (TYLENOL) tablet 650 mg  650 mg Oral Q6H PRN Shwetha Mcdaniels MD        Or    acetaminophen (TYLENOL) suppository 650 mg  650 mg Rectal Q6H PRN Shwetha Mcdaniels MD        0.9 % sodium chloride infusion   IntraVENous Continuous Howard Cleary MD 50 mL/hr at 10/25/22 1157 New Bag at 10/25/22 1157    trimethobenzamide (TIGAN) injection 200 mg  200 mg IntraMUSCular Q6H PRN Margarita Steele MD   200 mg at 10/24/22 2248    morphine (PF) injection 2 mg  2 mg IntraVENous Q4H PRN Margarita Steele MD        aspirin EC tablet 81 mg  81 mg Oral Daily Shwetha Mcdaniels MD   81 mg at 10/25/22 1376    melatonin tablet 6 mg  6 mg Oral Daily Shwetha Mcdaniels MD        meloxicam [facility]ER[facility]O [facility]) tablet 15 mg  15 mg Oral Daily Shwetha Mcdaniels MD        pantoprazole (PROTONIX) tablet 40 mg  40 mg Oral QAM AC Shwetha Mcdaniels MD        rosuvastatin (CRESTOR) tablet 40 mg  40 mg Oral Daily Shwetha Mcdaniels MD        traZODone (DESYREL) tablet 50 mg  50 mg Oral Nightly Shwetha Mcdaniels MD        metronidazole (FLAGYL) 500 mg in 0.9% NaCl 100 mL IVPB premix  500 mg IntraVENous Q8H Shwetha Mcdaniels  mL/hr at 10/25/22 1159 500 mg at 10/25/22 1159    enoxaparin (LOVENOX) injection 40 mg  40 mg SubCUTAneous Daily Shwetha Mcdaniels MD   40 mg at 10/25/22 9856        No Known Allergies    ROS:   Constitutional: Negative for fever. Positive for activity change and appetite change. HENT: Negative for epistaxis. Eyes: Negative for diploplia, blurred vision. Respiratory: Negative for cough, chest tightness, shortness of breath and wheezing. Cardiovascular: pertinent positives in HPI  Gastrointestinal: Positive for for abdominal pain and negative for blood in stool.    All other review of systems are negative     PHYSICAL EXAM:   Vitals:    10/25/22 0922 10/25/22 0925 10/25/22 0927 10/25/22 1145   BP: (!) 88/56 (!) 93/55 109/71 129/75   Pulse: (!) 108   86   Resp: 20   20   Temp: 97.3 °F (36.3 °C)   97.3 °F (36.3 °C)   TempSrc: Temporal   Temporal   SpO2: 93%   96%   Weight:          Constitutional: Well-developed, no acute distress  Eyes: conjunctivae normal, no xanthelasma   Ears, Nose, Throat: oral mucosa moist, no cyanosis   CV: no JVD. IRIR. No murmurs, rubs, or gallops. PMI is nondisplaced  Lungs: clear to auscultation bilaterally, normal respiratory effort without used of accessory muscles  Abdomen: soft, tender at lower abdomen, bowel sounds present, no masses or hepatomegaly   Musculoskeletal: no digital clubbing, no edema   Skin: warm, no rashes     I have personally reviewed the laboratory, cardiac diagnostic and radiographic testing as outlined below:    Data:    Recent Labs     10/24/22  0800 10/25/22  0530   WBC 15.9* 7.3   HGB 16.1 13.4   HCT 46.7 38.3    174     Recent Labs     10/24/22  0800 10/24/22  1625 10/25/22  0530   * 132 130*   K 3.9 4.1 4.2   CL 88* 95* 98   CO2 21* 24 21*   BUN 10 8 10   CREATININE 1.2 0.9 0.8   CALCIUM 9.4 8.6 8.2*      Lab Results   Component Value Date/Time    MG 2.4 10/25/2022 05:30 AM     No results for input(s): TSH in the last 72 hours. No results for input(s): INR in the last 72 hours. CXR 10/24/2022:      FINDINGS:   Single AP upright portable chest demonstrate satisfactory expansion lungs   which are clear. The cardiac silhouette appears unremarkable. There is no   evidence of a pneumothorax. Impression   No acute cardiopulmonary process. Telemetry: AF rates around 110 bpm    EKG 10/24/22:  bpm, Qtc 472 ms. Please see scan in Cardiology. Echocardiogram 10/11/22: Findings      Left Ventricle   Normal left ventricle size. Normal left ventricle wall thickness. No wall motion abnormalities. Indeterminate diastolic function. Ejection fraction is visually estimated at 55-60%. Right Ventricle   Normal right ventricle structure and function. Left Atrium   The left atrium is mildly dilated by LISA. Right Atrium   Mildly enlarged right atrium size. Mitral Valve   Physiologic and/or trace mitral regurgitation is present. Tricuspid Valve   Normal tricuspid valve structure and function. RVSP is 35 mmHg. Aortic Valve   Trileaflet aortic valve. Normal leaflet mobility. No aortic stenosis. No aortic regurgitation. Pulmonic Valve   Normal pulmonic valve structure and function. Pericardial Effusion   No evidence for hemodynamically significant pericardial effusion. Aorta   Normal aortic root. Miscellaneous   The inferior vena cava diameter is normal with normal respiratory   variation. Conclusions      Summary   No significant valvular abnormalities. Normal left ventricle size. Ejection fraction is visually estimated at 55-60%. Mild bi-atrial enlargement   Atrial fibrillation      Signature      ----------------------------------------------------------------   Electronically signed by Jean Ibarra MD(Interpreting   physician) on 10/11/2022 11:43 AM   ----------------------------------------------------------------     Telemetry 10/24/22 : AF  bpm     Stress Test 11/15/2021:                                                              Exercise ECG:   The patient demonstrated rare PVC during exercise. Occassional PVC's at rest.     With exercise, 1 mm downsloping ST depression in the inferior leads and 1 mm horizontal ST depression in the anterolateral leads. With exercise up to 0.5 mm mm of downsloping ST depression was noted in leads I, II, III, AVF,: And a 0.5 mm horizontal ST depression in V4, V5 and V6 with initial changes beginning at 5 minutes into exercise, at a heart rate of 138. These changes resolved shortly during recovery. The predictive value for ischemia was low . Shetty treadmill score was 6 implying low  risk.                 IMAGING: Myocardial perfusion imaging was performed at rest 30-35 minutes following the intravenous injection of 10.4 mCi of (Tc-Sestamibi) followed by 10 ml of Normal Saline. At peak exercise, the patient was injected intravenously with 33.3 mCi of (Tc-Sestamibi) followed by 10 ml of Normal Saline. Gated post-stress tomographic imaging was performed 20-25 minutes after stress. FINDINGS: The overall quality of the study was good. Left ventricular cavity size was noted to be normal.     Rotational analog analysis demonstrated soft tissue diaphragmatic attenuation. The gated SPECT stress imaging in the short, vertical long, and horizontal long axis demonstrated normal homogeneous tracer distribution throughout the myocardium. With left ventricular ejection fraction of 75% and no wall motion abnormalities. Impression:    Exercise EKG was negative. The patient experienced no chest pain with exercise. The myocardial perfusion imaging was normal.    Overall left ventricular systolic function was normal without regional wall motion abnormalities. Shetty treadmill score was 6 implying low risk. Exercise capacity was average. Low risk general exercise treadmill test.    I have independently reviewed all of the ECGs and rhythm strips per above     Assessment/Plan:     1. Longstanding persistent atrial fibrillation   - First noted 2011 per the patient reported  - First documented 2015 on available EKG  - CHADS-VASc of 0 on ASA  - On Atenolol 100 mg Nightly for rate control.   - No history of rhythm control with AAD, DCCV or ablation.   - Presented in AF with RVR in the setting of acute diverticulitis      2. Acute diverticulitis  - Diagnosed on CT abdomen 10/24/2022  - Possible microperforation.  - On IV Flagyl and Rocephin.  - Follows by general surgery . 3. GERD  - On Prilosec     4. Hyperlipdemia   - On Crestor at home     5. Tubular adenoma  - Found on colonoscopy in 2017     6.  Cervical radiculopathy   - On Mobic and Trazodone Recommendations:  Wean IV Cardizem as Able   Stop Atenolol   Start Toprol 50mg BID   4. If he is officially diagnosed with hypertension in the future, he would likely benefit from [address] for stroke risk reduction. 5.   Will follow    I have spent a total of 10 minutes with the patient and the family reviewing the above stated recommendations. And a total of >50% of that time involved face-to-face time providing counseling and or coordination of care with the other providers. Thank you for allowing me to participate in your patient's care. Please call me if there are any questions or concerns. Discussed with Dr Ashley Colunga. Luis Armando Lebron, APRN - CNP  Cardiac Electrophysiology  [facility]) Physicians  [facility] [place]: [facility]  12:22 PM  10/25/2022    Attending Physician's Statement    Patient seen with the ANP. Agree with the findings, assessment and plan. Management plan was discussed. I have personally interviewed the patient, independently performed a focused cardiac examination, reviewed the pertinent laboratory and diagnostic testing with the patient and directly participated in the medical decision-making as noted above with the following additions:     Feeling better. Abdominal pain improved. Remains in AF with -120 bpm on IV Cardizem drip. Physical exam showed no JVD, IRIR, no murmur, clear lungs bilaterally, no edema    Wean of IV Cardizem. Will switch Atenolol 100 mg daily to Toprol XL 50 mg bid. If he is officially diagnosed with hypertension in the future, he would likely benefit from [address] for stroke risk reduction. I have spent a total of 35 minutes with the patient and the family reviewing the above stated recommendations.   And a total of >50% of that time involved face-to-face time providing counseling and/or coordination of care with the other providers, reviewing records/tests, counseling/education of the patient, ordering medications/tests/procedures, coordinating care, and documenting clinical information in the EHR.      Alex Cuba MD  Cardiac Electrophysiology  [address]  [facility] [place]: [facility]  4:28 PM  10/25/2022

## 2022-10-25 NOTE — PLAN OF CARE
Problem: Discharge Planning  Goal: Discharge to home or other facility with appropriate resources  Outcome: Progressing  Flowsheets (Taken 10/25/2022 1896)  Discharge to home or other facility with appropriate resources:   Identify barriers to discharge with patient and caregiver   Identify discharge learning needs (meds, wound care, etc)     Problem: Pain  Goal: Verbalizes/displays adequate comfort level or baseline comfort level  Outcome: Progressing

## 2022-10-26 LAB
ANION GAP SERPL CALCULATED.3IONS-SCNC: 11 MMOL/L (ref 7–16)
BASOPHILS ABSOLUTE: 0.02 E9/L (ref 0–0.2)
BASOPHILS RELATIVE PERCENT: 0.3 % (ref 0–2)
BUN BLDV-MCNC: 6 MG/DL (ref 6–23)
BURR CELLS: ABNORMAL
CALCIUM SERPL-MCNC: 7.7 MG/DL (ref 8.6–10.2)
CHLORIDE BLD-SCNC: 98 MMOL/L (ref 98–107)
CO2: 22 MMOL/L (ref 22–29)
CREAT SERPL-MCNC: 0.7 MG/DL (ref 0.7–1.2)
EKG ATRIAL RATE: 170 BPM
EKG Q-T INTERVAL: 312 MS
EKG QRS DURATION: 98 MS
EKG QTC CALCULATION (BAZETT): 474 MS
EKG R AXIS: 76 DEGREES
EKG T AXIS: -41 DEGREES
EKG VENTRICULAR RATE: 139 BPM
EOSINOPHILS ABSOLUTE: 0.29 E9/L (ref 0.05–0.5)
EOSINOPHILS RELATIVE PERCENT: 3.8 % (ref 0–6)
GFR SERPL CREATININE-BSD FRML MDRD: >60 ML/MIN/1.73
GLUCOSE BLD-MCNC: 110 MG/DL (ref 74–99)
HCT VFR BLD CALC: 35.4 % (ref 37–54)
HEMOGLOBIN: 12.1 G/DL (ref 12.5–16.5)
IMMATURE GRANULOCYTES #: 0.04 E9/L
IMMATURE GRANULOCYTES %: 0.5 % (ref 0–5)
LYMPHOCYTES ABSOLUTE: 0.17 E9/L (ref 1.5–4)
LYMPHOCYTES RELATIVE PERCENT: 2.2 % (ref 20–42)
MAGNESIUM: 2.1 MG/DL (ref 1.6–2.6)
MCH RBC QN AUTO: 29.8 PG (ref 26–35)
MCHC RBC AUTO-ENTMCNC: 34.2 % (ref 32–34.5)
MCV RBC AUTO: 87.2 FL (ref 80–99.9)
MONOCYTES ABSOLUTE: 0.64 E9/L (ref 0.1–0.95)
MONOCYTES RELATIVE PERCENT: 8.4 % (ref 2–12)
NEUTROPHILS ABSOLUTE: 6.46 E9/L (ref 1.8–7.3)
NEUTROPHILS RELATIVE PERCENT: 84.8 % (ref 43–80)
OVALOCYTES: ABNORMAL
PDW BLD-RTO: 13.8 FL (ref 11.5–15)
PLATELET # BLD: 175 E9/L (ref 130–450)
PMV BLD AUTO: 10.4 FL (ref 7–12)
POIKILOCYTES: ABNORMAL
POTASSIUM REFLEX MAGNESIUM: 3.3 MMOL/L (ref 3.5–5)
RBC # BLD: 4.06 E12/L (ref 3.8–5.8)
SODIUM BLD-SCNC: 131 MMOL/L (ref 132–146)
TROPONIN, HIGH SENSITIVITY: 9 NG/L (ref 0–11)
WBC # BLD: 7.6 E9/L (ref 4.5–11.5)

## 2022-10-26 PROCEDURE — 6360000002 HC RX W HCPCS

## 2022-10-26 PROCEDURE — 2580000003 HC RX 258

## 2022-10-26 PROCEDURE — 2060000000 HC ICU INTERMEDIATE R&B

## 2022-10-26 PROCEDURE — 83735 ASSAY OF MAGNESIUM: CPT

## 2022-10-26 PROCEDURE — 36415 COLL VENOUS BLD VENIPUNCTURE: CPT

## 2022-10-26 PROCEDURE — 6370000000 HC RX 637 (ALT 250 FOR IP): Performed by: FAMILY MEDICINE

## 2022-10-26 PROCEDURE — 6370000000 HC RX 637 (ALT 250 FOR IP): Performed by: NURSE PRACTITIONER

## 2022-10-26 PROCEDURE — 99232 SBSQ HOSP IP/OBS MODERATE 35: CPT | Performed by: FAMILY MEDICINE

## 2022-10-26 PROCEDURE — 85025 COMPLETE CBC W/AUTO DIFF WBC: CPT

## 2022-10-26 PROCEDURE — 6370000000 HC RX 637 (ALT 250 FOR IP)

## 2022-10-26 PROCEDURE — 84484 ASSAY OF TROPONIN QUANT: CPT

## 2022-10-26 PROCEDURE — 2500000003 HC RX 250 WO HCPCS: Performed by: EMERGENCY MEDICINE

## 2022-10-26 PROCEDURE — 93010 ELECTROCARDIOGRAM REPORT: CPT | Performed by: INTERNAL MEDICINE

## 2022-10-26 PROCEDURE — 99232 SBSQ HOSP IP/OBS MODERATE 35: CPT | Performed by: SURGERY

## 2022-10-26 PROCEDURE — 2500000003 HC RX 250 WO HCPCS

## 2022-10-26 PROCEDURE — 80048 BASIC METABOLIC PNL TOTAL CA: CPT

## 2022-10-26 PROCEDURE — 6370000000 HC RX 637 (ALT 250 FOR IP): Performed by: STUDENT IN AN ORGANIZED HEALTH CARE EDUCATION/TRAINING PROGRAM

## 2022-10-26 RX ORDER — LOPERAMIDE HYDROCHLORIDE 2 MG/1
2 CAPSULE ORAL 4 TIMES DAILY PRN
Status: DISCONTINUED | OUTPATIENT
Start: 2022-10-26 | End: 2022-10-28 | Stop reason: HOSPADM

## 2022-10-26 RX ORDER — LACTOBACILLUS RHAMNOSUS GG 10B CELL
1 CAPSULE ORAL DAILY
Status: DISCONTINUED | OUTPATIENT
Start: 2022-10-26 | End: 2022-10-28 | Stop reason: HOSPADM

## 2022-10-26 RX ORDER — POTASSIUM CHLORIDE 20 MEQ/1
40 TABLET, EXTENDED RELEASE ORAL ONCE
Status: COMPLETED | OUTPATIENT
Start: 2022-10-26 | End: 2022-10-26

## 2022-10-26 RX ADMIN — Medication 10 ML: at 21:28

## 2022-10-26 RX ADMIN — MELOXICAM 15 MG: 7.5 TABLET ORAL at 09:24

## 2022-10-26 RX ADMIN — SODIUM CHLORIDE: 9 INJECTION, SOLUTION INTRAVENOUS at 08:04

## 2022-10-26 RX ADMIN — TRAZODONE HYDROCHLORIDE 50 MG: 50 TABLET ORAL at 21:28

## 2022-10-26 RX ADMIN — Medication 20 ML: at 09:27

## 2022-10-26 RX ADMIN — Medication 1 CAPSULE: at 16:04

## 2022-10-26 RX ADMIN — METRONIDAZOLE 500 MG: 5 INJECTION, SOLUTION INTRAVENOUS at 21:39

## 2022-10-26 RX ADMIN — POTASSIUM CHLORIDE 40 MEQ: 1500 TABLET, EXTENDED RELEASE ORAL at 17:59

## 2022-10-26 RX ADMIN — ENOXAPARIN SODIUM 40 MG: 100 INJECTION SUBCUTANEOUS at 09:20

## 2022-10-26 RX ADMIN — METRONIDAZOLE 500 MG: 5 INJECTION, SOLUTION INTRAVENOUS at 12:43

## 2022-10-26 RX ADMIN — LOPERAMIDE HYDROCHLORIDE 2 MG: 2 CAPSULE ORAL at 09:21

## 2022-10-26 RX ADMIN — DEXTROSE MONOHYDRATE 5 MG/HR: 50 INJECTION, SOLUTION INTRAVENOUS at 03:51

## 2022-10-26 RX ADMIN — METOPROLOL SUCCINATE 50 MG: 50 TABLET, EXTENDED RELEASE ORAL at 09:22

## 2022-10-26 RX ADMIN — WATER 2000 MG: 1 INJECTION INTRAMUSCULAR; INTRAVENOUS; SUBCUTANEOUS at 09:25

## 2022-10-26 RX ADMIN — METRONIDAZOLE 500 MG: 5 INJECTION, SOLUTION INTRAVENOUS at 05:26

## 2022-10-26 RX ADMIN — ROSUVASTATIN 40 MG: 20 TABLET, FILM COATED ORAL at 09:22

## 2022-10-26 RX ADMIN — METOPROLOL SUCCINATE 50 MG: 50 TABLET, EXTENDED RELEASE ORAL at 21:28

## 2022-10-26 RX ADMIN — PANTOPRAZOLE SODIUM 40 MG: 40 TABLET, DELAYED RELEASE ORAL at 06:45

## 2022-10-26 RX ADMIN — LOPERAMIDE HYDROCHLORIDE 2 MG: 2 CAPSULE ORAL at 12:45

## 2022-10-26 RX ADMIN — MELATONIN 3 MG ORAL TABLET 6 MG: 3 TABLET ORAL at 21:28

## 2022-10-26 RX ADMIN — ASPIRIN 81 MG: 81 TABLET, COATED ORAL at 09:29

## 2022-10-26 RX ADMIN — Medication 10 ML: at 21:40

## 2022-10-26 ASSESSMENT — PAIN SCALES - GENERAL: PAINLEVEL_OUTOF10: 0

## 2022-10-26 NOTE — PLAN OF CARE
Problem: Discharge Planning  Goal: Discharge to home or other facility with appropriate resources  10/26/2022 1257 by Corine Hernandez RN  Outcome: Progressing  10/26/2022 0449 by Bubba Greer RN  Outcome: Progressing  Flowsheets (Taken 10/25/2022 1944)  Discharge to home or other facility with appropriate resources: Identify barriers to discharge with patient and caregiver     Problem: Pain  Goal: Verbalizes/displays adequate comfort level or baseline comfort level  10/26/2022 1257 by Corine Hernandez RN  Outcome: Progressing  10/26/2022 0449 by Bubba Greer RN  Outcome: Progressing     Problem: Safety - Adult  Goal: Free from fall injury  Outcome: Progressing

## 2022-10-26 NOTE — PROGRESS NOTES
[facility] - Family Medicine Inpatient   Resident Progress Note    S:  Hospital day: 2    Brief Synopsis: Mitchell Mcneil is a 58 y.o. male with a PMH of Afib on ASA 81, HLD on Crestor 40mg, GERD, anxiety, diverticulosis, tubular adenoma found on last colonoscopy in 2017. The patient presented for abdominal pain with diarrhea. Since Friday night, patient has been having abdominal pain. It is sharp, localized on the lower abdomen and radiated nowhere else. He had multiple bouts of watery brown diarrhea. He denies blood or mucus in the stool. On Monday, he woke up with lightheadedness and heart palpitations. In the ED, pt had a HR of 120 and found to be in Afib RVR. He was started on diltiazem drip. IV Rocephin and Metronidazole were started after CT showed acute diverticulitis with mircoperforation. Pain continued to decrease. Overnight, he had an episode of chest pain. He attributes it to stress. EKG and troponin showed no real changes. HR was 90-100s. Reports his normal HR is about 85. This morning, he report that pain is gone. Denies any abdominal pain as well. He continues to have multiple bouts of diarrhea, so Imodium was given. He has decreased appetite but could keep food down.       Cont meds:    dilTIAZem 5 mg/hr (10/26/22 0351)    sodium chloride 50 mL/hr at 10/26/22 0804     Scheduled meds:    cefTRIAXone (ROCEPHIN) IV  2,000 mg IntraVENous Q24H    metoprolol succinate  50 mg Oral BID    fentanNYL  50 mcg IntraVENous Once    sodium chloride flush  5-40 mL IntraVENous 2 times per day    aspirin  81 mg Oral Daily    melatonin  6 mg Oral Daily    meloxicam  15 mg Oral Daily    pantoprazole  40 mg Oral QAM AC    rosuvastatin  40 mg Oral Daily    traZODone  50 mg Oral Nightly    metroNIDAZOLE  500 mg IntraVENous Q8H    enoxaparin  40 mg SubCUTAneous Daily     PRN meds: loperamide, sodium chloride flush, polyethylene glycol, acetaminophen **OR** acetaminophen, trimethobenzamide, morphine     I reviewed the patient's Past Medical and Surgical History, Medications and Allergies. O:  VS: /69   Pulse 89   Temp 97.8 °F (36.6 °C) (Temporal)   Resp 27   Wt 185 lb (83.9 kg)   SpO2 94%   BMI 25.09 kg/m²     Physical Exam:  Physical Exam  Constitutional:       General: He is not in acute distress. Cardiovascular:      Rate and Rhythm: Tachycardia present. Rhythm irregular. Heart sounds: No murmur heard. Pulmonary:      Effort: Pulmonary effort is normal.      Breath sounds: Normal breath sounds. Abdominal:      General: There is no distension. Palpations: Abdomen is soft. Tenderness: There is abdominal tenderness (mild tenderness in the LLQ). Comments: No bowel sounds appreciated   Musculoskeletal:      Right lower leg: No edema. Left lower leg: No edema. Skin:     General: Skin is warm and dry. Neurological:      General: No focal deficit present. Mental Status: He is alert and oriented to person, place, and time. Labs:  Na/K/Cl/CO2:  131/3.3/98/22 (10/26 0802)  BUN/Cr/glu/ALT/AST/amyl/lip:  6/0.7/--/--/--/--/-- (10/26 0751)  WBC/Hgb/Hct/Plts:  7.6/12.1/35.4/175 (10/26 3570)  estimated creatinine clearance is 120 mL/min (based on SCr of 0.7 mg/dL). Other pertinent labs as noted below    New Imaging:  CTA ABDOMEN PELVIS W CONTRAST   Final Result   Abnormal wall thickening identified of the sigmoid colon to suggest and acute   sigmoid diverticulitis with extensive surrounding stranding and fluid without   loculation at this time. There is extraluminal air to suggest a perforation. There is adjacent wall thickening of the small bowel and bladder from the   surrounding inflammatory changes with dilatation of the small bowel   proximally as well as dilatation of the stomach. Mild ileus cannot be   excluded. Minimal vascular atherosclerotic disease. No evidence of significant   stenosis or thrombus within the vessels.       Findings were discussed with Dr. Amy Parks at 11:27 a.m. on 10/24/2022         XR CHEST PORTABLE   Final Result   No acute cardiopulmonary process. A/P:  Principal Problem:    Acute diverticulitis  Active Problems:    Atrial fibrillation with rapid ventricular response (HCC)    Paroxysmal atrial fibrillation (HCC)  Resolved Problems:    * No resolved hospital problems. *      Acute diverticulitis  -WBC 15.9, lactic acid 3.5, Na 127, Cl 88 on admission  -CT showed acute diverticulitis with possible microperforation, Rocephin x1 and Metronidazole x1 given in the ED  -continue Rocephin 1g daily and Metronidazole 500mg q8h starting at 2000  -Gen Surg recommend advancing diet, decreasing IVF  -low fiber diet as tolerated  -pain management morphine 2mg q4h PRN  -continue home meds  -BMP and CBC daily  -imodium given for diarrhea since C. Diff was negative.      Afib w RVR  -pt had paroxysmal Afib diagnosed for 11 years, last ECHO done on 10/11/2022 showing EF 55-60% and mild atrial enlargement  -HR at 120 at admission, only controlled with diltiazem drip  -continue diltiazem drip, wean as tolerated  -consulted EP as pt is known to Dr Todd Turner  -placed on telemetry  -continue ASA 81, start on metoprolol 50mg BID     Hyponatremia  -possibly due to low PO intake since Friday, 2 boluses of IV NS given in the ED  -Na 131 today, omaintenance fluid as well  -BMP daily     Lactic acidosis - resolved  -lactic acid 3.5 on admission, normalized to 1.5 after IVF and IV ATB  -monitor for signs of infection     Elevated troponin - resolved  -troponin at 12 on admission, possibly due to Afib  -last stress test was in Nov 2021, showed low risk of ACS  -consider consulting cardiology if not improving  -repeat troponin at 9     Elevated bilirubin  -possibly due to low PO intake and dehydration  -will continue to monitor     Hx of HLD  -continue Crestor 40mg     Hx of GERD  -continue PO Protonix 40mg     Hx of Insomnia  -continue home trazodone and melatonin     Hx of cervical radiculopathy, left arm weakness  -continue Meloxicam  -morphine PRN for pain control     11.  Hx of DMT2 on diet control  -elevated glucose to 195 at admission  -HbA1C at 6.0 in 5/2022, rechecked at 6.2  -continue to monitor     PT/OT evaluation: none  DVT prophylaxis: Lovenox 40mg daily  GI prophylaxis: Protonix 40mg daily  Diet: Full liquid diet, advance if tolerated  Code Full  Disposition: home      Electronically signed by Gemini Celestin MD on 10/26/2022 at 9:45 AM  This case was discussed with attending physician Dr. Linda Jolley

## 2022-10-26 NOTE — ACP (ADVANCE CARE PLANNING)
Advance Care Planning   Healthcare Decision Maker:    Primary Decision Maker: Johanny Ny - Spouse - [phone]    Click here to complete Healthcare Decision Makers including selection of the Healthcare Decision Maker Relationship (ie \"Primary\").

## 2022-10-26 NOTE — PROGRESS NOTES
Notified Dr Michael Braswell pt was complaining of 3/10 chest pain pt stated it could be from stress obtained ekg.

## 2022-10-26 NOTE — PROGRESS NOTES
[address] [facility]  Family Medicine Attending    S: 58 y.o. male with acute diverticulitis and afib rvr. He had abdominal pain on admission that is now improving with the addition of antibiotics. His nausea is also improving. He is starting to do better with diet, however does not have much of an appetite currently. He was placed on a cardizem gtt in the ED for the afib. The Cardizem drip has been slightly weaned. EPS is also following. Patient's atenolol was stopped and metoprolol was started twice daily. There has been some improvement in his tachycardia. Patient denies any chest pain but continues to notice the palpitations from time to time. Those monitor is showing persistent atrial fibrillation with frequent PVCs. He continues to have watery diarrhea for which he has Imodium ordered. C. difficile was found to be negative. He did have an episode of chest pain last night which was evaluated with a normal troponin and no change in EKG at the time. Patient notes that the pain is worse when he has increased anxiety. There is no chest pain this morning. O: VS- Blood pressure 112/69, pulse 89, temperature 97.8 °F (36.6 °C), temperature source Temporal, resp. rate 27, weight 185 lb (83.9 kg), SpO2 94 %. Exam is as noted by resident with the following changes, additions or corrections:  Gen:  AAO  Resp:  CTAB  CVS:  irregularly irregular  Abd:  soft, no further tenderness in the abdomen, no rebound or guarding. Impressions:  Acute diverticulitis  Atrial fibrillation with rapid ventricular response (HCC)  Paroxysmal atrial fibrillation (HCC)  Hyponatremia  Dehydration  Gerd    Plan:      Continue abx-rocephin and flagyl  Resume home medications  Fluid bolus given due to RVR as well as persistent low UO/dehydration and hyponatremia-improved urine output.  P.o.  intake encouraged  Gen surg consulted-plans for outpatient colonoscopy  EPS following-patient now on metoprolol instead of the atenolol that he was previously on. Protonix for GERD symptoms. Attending Physician Statement  I have reviewed the chart and seen the patient with the resident(s). I personally reviewed images, EKG's and similar tests, if present. I personally reviewed and performed key elements of the history and exam.  I have reviewed and confirmed student and/or resident history and exam with changes as indicated above. I agree with the assessment, plan and orders as documented by the resident. Please refer to the resident and/or student note for additional information.     Giacomo Gilmore MD

## 2022-10-26 NOTE — CARE COORDINATION
Met with pt at bedside to discuss discharge / transition of care plan. Pt reports from home with spouse Jordan Nava; verified PCP and pharmacy; independent of all ADL; actively employed; active ; denies DME or needs; discharge plan is to return home with no needs once medically stable, [place] to provide transportation.

## 2022-10-26 NOTE — PROGRESS NOTES
Comprehensive Nutrition Assessment    Type and Reason for Visit:  Initial, Positive Nutrition Screen    Nutrition Recommendations/Plan:   Continue Diet. Will Start ONS and monitor. Malnutrition Assessment:  Malnutrition Status: At risk for malnutrition (Comment) (10/26/22 2692)    Context:  Acute Illness     Findings of the 6 clinical characteristics of malnutrition:  Energy Intake:  50% or less of estimated energy requirements for 5 or more days  Weight Loss:  Unable to assess (2/2 poor EMR wt hx since adm currently)     Body Fat Loss:  No significant body fat loss     Muscle Mass Loss:  No significant muscle mass loss    Fluid Accumulation:  No significant fluid accumulation     Strength:  Not Performed    Nutrition Assessment:    Pt adm w/ N/V/D and Abd Pain x ~3d pta. PMHx DM, GERD, HLD;  Adm w/ Acute Diverticulitis w/ Contained Perforation and AF w/ RVR. Pt at nutritional risk d/t noted poor ce/intake x ~5d total now (including pta) 2/2 altered GI. Will Start ONS and monitor. Nutrition Related Findings:    A&O, dentition WNL, Abd WDL, Hypo BS, +diarrhea, Trace BLE edema, I/O's WNL, +Hyponatremia/kalemia, hyperglycemia Wound Type: None       Current Nutrition Intake & Therapies:    Average Meal Intake: 26-50%  Average Supplements Intake: None Ordered  ADULT DIET; Regular; Low Fiber    Anthropometric Measures:  Height: 6' (182.9 cm)  Ideal Body Weight (IBW): 178 lbs (81 kg)    Admission Body Weight: 185 lb (83.9 kg) (unknown method 10/24)  Current Body Weight: 185 lb (83.9 kg) (unknown method 10/24), 103.9 % IBW. Weight Source: Not Specified  Current BMI (kg/m2): 25.1  Usual Body Weight: 187 lb (84.8 kg) (actual 9/28/22 per EMR)  % Weight Change (Calculated): -1.1  Weight Adjustment For: No Adjustment                 BMI Categories: Overweight (BMI 25.0-29. 9)    Estimated Daily Nutrient Needs:  Energy Requirements Based On: Formula  Weight Used for Energy Requirements: Current  Energy (kcal/day): MSJx1.2SF CBW=   Weight Used for Protein Requirements: Ideal  Protein (g/day): 1.3-1.5gm/kg IBW= 105-120  Method Used for Fluid Requirements: 1 ml/kcal  Fluid (ml/day):     Nutrition Diagnosis:   Inadequate oral intake related to altered GI function (2/2 Diverticulitis) as evidenced by intake 26-50%, poor intake prior to admission, GI abnormality, nausea, vomiting, diarrhea    Nutrition Interventions:   Food and/or Nutrient Delivery: Continue Current Diet, Start Oral Nutrition Supplement (Continue Diet. Will Start ONS and monitor.)  Nutrition Education/Counseling: Education not indicated  Coordination of Nutrition Care: Continue to monitor while inpatient       Goals:     Goals: PO intake 75% or greater, by next RD assessment       Nutrition Monitoring and Evaluation:   Behavioral-Environmental Outcomes: None Identified  Food/Nutrient Intake Outcomes: Food and Nutrient Intake, Supplement Intake  Physical Signs/Symptoms Outcomes: Biochemical Data, Diarrhea, GI Status, Nausea or Vomiting, Fluid Status or Edema, Nutrition Focused Physical Findings, Skin, Weight    Discharge Planning:     Too soon to determine     Gena Bae RD, LD  Contact: ext [phone]

## 2022-10-26 NOTE — PLAN OF CARE
Problem: Discharge Planning  Goal: Discharge to home or other facility with appropriate resources  10/26/2022 0449 by Keiry Alvarez RN  Outcome: Progressing  Flowsheets (Taken 10/25/2022 1944)  Discharge to home or other facility with appropriate resources: Identify barriers to discharge with patient and caregiver  10/25/2022 1639 by Todd Hernandez RN  Outcome: Progressing     Problem: Pain  Goal: Verbalizes/displays adequate comfort level or baseline comfort level  10/26/2022 0449 by Keiry Alvarez RN  Outcome: Progressing  10/25/2022 1639 by Todd Hernandez RN  Outcome: Progressing

## 2022-10-26 NOTE — PROGRESS NOTES
GENERAL SURGERY  DAILY PROGRESS NOTE  10/26/2022    CHIEF COMPLAINT:  Chief Complaint   Patient presents with    Abdominal Pain     Lower abdomen for 3 days     Diarrhea     Since Friday         SUBJECTIVE:  Pain better. Tolerated full liquid diet. Multiple liquid stools. OBJECTIVE:  BP (!) 100/58   Pulse 93   Temp 97.6 °F (36.4 °C)   Resp 18   Wt 185 lb (83.9 kg)   SpO2 94%   BMI 25.09 kg/m²     GENERAL:  NAD. A&Ox3. HEENT PERRL conj pink  LUNGS:  No increased work of breathing. BS clear b/l   CARDIOVASCULAR: A. Fib, no extra heart sounds   ABDOMEN:  Soft, non-distended, mild lower abdominal tenderness, no peritoneal signs  EXT NVI no edema       ASSESSMENT/PLAN:  58 y.o. male with acute sigmoid diverticulitis with contained perforation,     Diet  Pain control   Continue IV abx   Nausea control as needed   Chronic afib- ep following  After discussions with the patient 10/25, we will proceed with nonoperative management  Outpatient colonoscopy    Electronically signed by Radha Diehl MD on 10/26/2022 at 6:24 AM    Attending Attestation   Patient seen and examined, agree with resident note except for changes made by me, for remaining HP/Consult/progress note details please see resident HP/Consult/progress note. - diverticulitis, with perforation, improving, adat, cont abx, would wait till d/c before restarting [address] [place] [address].     Faviola Anguiano MD

## 2022-10-27 LAB
ANION GAP SERPL CALCULATED.3IONS-SCNC: 15 MMOL/L (ref 7–16)
BASOPHILS ABSOLUTE: 0 E9/L (ref 0–0.2)
BASOPHILS RELATIVE PERCENT: 0.1 % (ref 0–2)
BUN BLDV-MCNC: 5 MG/DL (ref 6–23)
BURR CELLS: ABNORMAL
CALCIUM SERPL-MCNC: 7.9 MG/DL (ref 8.6–10.2)
CHLORIDE BLD-SCNC: 98 MMOL/L (ref 98–107)
CO2: 20 MMOL/L (ref 22–29)
CREAT SERPL-MCNC: 0.6 MG/DL (ref 0.7–1.2)
EOSINOPHILS ABSOLUTE: 0.2 E9/L (ref 0.05–0.5)
EOSINOPHILS RELATIVE PERCENT: 2.6 % (ref 0–6)
GFR SERPL CREATININE-BSD FRML MDRD: >60 ML/MIN/1.73
GLUCOSE BLD-MCNC: 110 MG/DL (ref 74–99)
HCT VFR BLD CALC: 35.9 % (ref 37–54)
HEMOGLOBIN: 12.3 G/DL (ref 12.5–16.5)
LYMPHOCYTES ABSOLUTE: 0 E9/L (ref 1.5–4)
LYMPHOCYTES RELATIVE PERCENT: 4 % (ref 20–42)
MAGNESIUM: 2 MG/DL (ref 1.6–2.6)
MCH RBC QN AUTO: 29.6 PG (ref 26–35)
MCHC RBC AUTO-ENTMCNC: 34.3 % (ref 32–34.5)
MCV RBC AUTO: 86.3 FL (ref 80–99.9)
MONOCYTES ABSOLUTE: 0.3 E9/L (ref 0.1–0.95)
MONOCYTES RELATIVE PERCENT: 4.4 % (ref 2–12)
NEUTROPHILS ABSOLUTE: 6.98 E9/L (ref 1.8–7.3)
NEUTROPHILS RELATIVE PERCENT: 93 % (ref 43–80)
PDW BLD-RTO: 13.7 FL (ref 11.5–15)
PLATELET # BLD: 203 E9/L (ref 130–450)
PMV BLD AUTO: 10.4 FL (ref 7–12)
POIKILOCYTES: ABNORMAL
POTASSIUM REFLEX MAGNESIUM: 3.5 MMOL/L (ref 3.5–5)
RBC # BLD: 4.16 E12/L (ref 3.8–5.8)
SODIUM BLD-SCNC: 133 MMOL/L (ref 132–146)
WBC # BLD: 7.5 E9/L (ref 4.5–11.5)

## 2022-10-27 PROCEDURE — 6370000000 HC RX 637 (ALT 250 FOR IP): Performed by: STUDENT IN AN ORGANIZED HEALTH CARE EDUCATION/TRAINING PROGRAM

## 2022-10-27 PROCEDURE — 36415 COLL VENOUS BLD VENIPUNCTURE: CPT

## 2022-10-27 PROCEDURE — 83735 ASSAY OF MAGNESIUM: CPT

## 2022-10-27 PROCEDURE — 85025 COMPLETE CBC W/AUTO DIFF WBC: CPT

## 2022-10-27 PROCEDURE — 99233 SBSQ HOSP IP/OBS HIGH 50: CPT | Performed by: INTERNAL MEDICINE

## 2022-10-27 PROCEDURE — 80048 BASIC METABOLIC PNL TOTAL CA: CPT

## 2022-10-27 PROCEDURE — 2580000003 HC RX 258

## 2022-10-27 PROCEDURE — 6370000000 HC RX 637 (ALT 250 FOR IP)

## 2022-10-27 PROCEDURE — 6370000000 HC RX 637 (ALT 250 FOR IP): Performed by: NURSE PRACTITIONER

## 2022-10-27 PROCEDURE — 6360000002 HC RX W HCPCS

## 2022-10-27 PROCEDURE — 2060000000 HC ICU INTERMEDIATE R&B

## 2022-10-27 PROCEDURE — 2500000003 HC RX 250 WO HCPCS

## 2022-10-27 PROCEDURE — 99232 SBSQ HOSP IP/OBS MODERATE 35: CPT | Performed by: FAMILY MEDICINE

## 2022-10-27 PROCEDURE — 6370000000 HC RX 637 (ALT 250 FOR IP): Performed by: FAMILY MEDICINE

## 2022-10-27 RX ORDER — CALCIUM CARBONATE 200(500)MG
500 TABLET,CHEWABLE ORAL 2 TIMES DAILY PRN
Status: DISCONTINUED | OUTPATIENT
Start: 2022-10-27 | End: 2022-10-28 | Stop reason: HOSPADM

## 2022-10-27 RX ORDER — METOPROLOL SUCCINATE 50 MG/1
50 TABLET, EXTENDED RELEASE ORAL ONCE
Status: COMPLETED | OUTPATIENT
Start: 2022-10-27 | End: 2022-10-27

## 2022-10-27 RX ORDER — METOPROLOL SUCCINATE 100 MG/1
100 TABLET, EXTENDED RELEASE ORAL 2 TIMES DAILY
Status: DISCONTINUED | OUTPATIENT
Start: 2022-10-27 | End: 2022-10-28 | Stop reason: HOSPADM

## 2022-10-27 RX ORDER — FLUTICASONE PROPIONATE 50 MCG
1 SPRAY, SUSPENSION (ML) NASAL DAILY
Status: DISCONTINUED | OUTPATIENT
Start: 2022-10-27 | End: 2022-10-28 | Stop reason: HOSPADM

## 2022-10-27 RX ADMIN — METOPROLOL SUCCINATE 50 MG: 50 TABLET, EXTENDED RELEASE ORAL at 08:50

## 2022-10-27 RX ADMIN — MELOXICAM 15 MG: 7.5 TABLET ORAL at 08:50

## 2022-10-27 RX ADMIN — METOPROLOL SUCCINATE 100 MG: 100 TABLET, EXTENDED RELEASE ORAL at 20:30

## 2022-10-27 RX ADMIN — Medication 10 ML: at 08:51

## 2022-10-27 RX ADMIN — METOPROLOL SUCCINATE 50 MG: 50 TABLET, EXTENDED RELEASE ORAL at 10:55

## 2022-10-27 RX ADMIN — WATER 2000 MG: 1 INJECTION INTRAMUSCULAR; INTRAVENOUS; SUBCUTANEOUS at 08:50

## 2022-10-27 RX ADMIN — TRAZODONE HYDROCHLORIDE 50 MG: 50 TABLET ORAL at 22:27

## 2022-10-27 RX ADMIN — METRONIDAZOLE 500 MG: 5 INJECTION, SOLUTION INTRAVENOUS at 12:36

## 2022-10-27 RX ADMIN — METRONIDAZOLE 500 MG: 5 INJECTION, SOLUTION INTRAVENOUS at 20:37

## 2022-10-27 RX ADMIN — ENOXAPARIN SODIUM 40 MG: 100 INJECTION SUBCUTANEOUS at 08:54

## 2022-10-27 RX ADMIN — MELATONIN 3 MG ORAL TABLET 6 MG: 3 TABLET ORAL at 22:27

## 2022-10-27 RX ADMIN — Medication 1 CAPSULE: at 08:58

## 2022-10-27 RX ADMIN — SODIUM CHLORIDE: 9 INJECTION, SOLUTION INTRAVENOUS at 05:39

## 2022-10-27 RX ADMIN — ASPIRIN 81 MG: 81 TABLET, COATED ORAL at 08:50

## 2022-10-27 RX ADMIN — ROSUVASTATIN 40 MG: 20 TABLET, FILM COATED ORAL at 08:50

## 2022-10-27 RX ADMIN — Medication 10 ML: at 22:38

## 2022-10-27 RX ADMIN — FLUTICASONE PROPIONATE 1 SPRAY: 50 SPRAY, METERED NASAL at 22:28

## 2022-10-27 RX ADMIN — METRONIDAZOLE 500 MG: 5 INJECTION, SOLUTION INTRAVENOUS at 04:41

## 2022-10-27 RX ADMIN — PANTOPRAZOLE SODIUM 40 MG: 40 TABLET, DELAYED RELEASE ORAL at 06:04

## 2022-10-27 NOTE — PROGRESS NOTES
[address] and Vascular [facility] Electrophysiology  Inpatient Progress  Report  PATIENT: Pearl West  MEDICAL RECORD NUMBER: [id number]  DATE OF SERVICE:  10/27/2022  ATTENDING ELECTROPHYSIOLOGIST: Jesus Eller MD  PRIMARY ELECTROPHYSIOLOGIST: Annette Miramontes MD   REFERRING PHYSICIAN:  Ismael Seymour MD  CHIEF COMPLAINT: Abdominal pain    HPI:  The patient is a 58year-old gentleman with significant past medical history of long standing persistent atrial fibrillation, hyperlipidemia and GERD who presented to the hospital complaining of abdominal pain since 10/21/22. He describes pain across lower abdomen associated with nausea and diarrhea. In ED he was noted to be in AF with RVR at 120 bpm. He was started on IV Cardizem. He underwent CT abdomen which showed acute diverticulitis with extensive surrounding stranding, fluid and extraluminal air. General surgery was consulted and he was started on IV antibiotics. He was recently seen by Dr. Pina Kan on 10/6/22 and Atenolol dose was increased to 100 mg daily. Echocardiogram on 10/11/22 showed LV EF of 55-60%. The patient reports occasional palpitations. He denies chest pain, dyspnea on exertion, lightheadedness, dizziness, syncope, PND, or orthopnea. 10/25/2022: The patient is seen in ER, he remains in AF on a Cardizem drip at a low dose with rates around 110 bpm, he has no cardiac complains and notes improved abdominal pain due to being NPO his Atenolol was held and he is willing to change to Toprol. 10/27/2022: The patient is seen in hospital follow-up, his abdominal pain has improved with antibiotics and he is now taking PO. His rates are  bpm and he remains on Cardizem Gtt and Toprol 50mg BID.        Patient Active Problem List    Diagnosis Date Noted    Acute diverticulitis 10/24/2022     Priority: Medium    Atrial fibrillation with rapid ventricular response ([address] 10/24/2022     Priority: Medium    Family history of colon cancer 08/05/2022     Priority: Medium    Chronic left shoulder pain 10/05/2018    Anxiety 02/16/2018    Sleep disturbance 02/16/2018    History of colon polyps     Adenomatous polyp of colon     Diverticulosis 10/13/2014    Tubular adenoma 10/13/2014    FH: colon cancer 09/12/2013    GERD (gastroesophageal reflux disease) 09/12/2013    Hypogonadism male 09/12/2013    Hyperlipidemia 09/12/2013    Paroxysmal atrial fibrillation ([address] 09/12/2013       Past Medical History:   Diagnosis Date    Anticoagulant long-term use     Anxiety     Atrial fibrillation (HCC)     GERD (gastroesophageal reflux disease)     Hyperlipidemia     Insomnia     Paroxysmal atrial fibrillation (HCC)        Family History   Problem Relation Age of Onset    Diabetes Mother         pre- daibetic    Hypertension Father     High Blood Pressure Father     High Cholesterol Father     Obesity Father     Colon Cancer Father     Cancer Sister     Cancer Paternal Grandmother         68    Stroke Paternal Grandfather        Social History     Tobacco Use    Smoking status: Never    Smokeless tobacco: Never   Substance Use Topics    Alcohol use:  Yes     Alcohol/week: 1.0 - 2.0 standard drink     Types: 1 - 2 Cans of beer per week       Current Facility-Administered Medications   Medication Dose Route Frequency Provider Last Rate Last Admin    metoprolol succinate (TOPROL XL) extended release tablet 100 mg  100 mg Oral BID Augustine Paulino MD        calcium carbonate (TUMS) chewable tablet 500 mg  500 mg Oral BID PRN Shwetha Vanegas MD        loperamide (IMODIUM) capsule 2 mg  2 mg Oral 4x Daily PRN Augustine Paulino MD   2 mg at 10/26/22 1245    lactobacillus (CULTURELLE) capsule 1 capsule  1 capsule Oral Daily Sae Torres MD   1 capsule at 10/27/22 0858    cefTRIAXone (ROCEPHIN) 2,000 mg in sterile water 20 mL IV syringe  2,000 mg IntraVENous Q24H Genaro Cordero MD   2,000 mg at 10/27/22 0850    fentaNYL (SUBLIMAZE) injection 50 mcg 50 mcg IntraVENous Once Woody Olivas MD        sodium chloride flush 0.9 % injection 5-40 mL  5-40 mL IntraVENous 2 times per day Helga Cotter MD   10 mL at 10/27/22 0851    sodium chloride flush 0.9 % injection 5-40 mL  5-40 mL IntraVENous PRN Cordell Sorensen MD        polyethylene glycol [facility]) packet 17 g  17 g Oral Daily PRN Cordell Sorensen MD        acetaminophen (TYLENOL) tablet 650 mg  650 mg Oral Q6H PRN Cordell Sorensen MD   650 mg at 10/25/22 2011    Or    acetaminophen (TYLENOL) suppository 650 mg  650 mg Rectal Q6H PRN Shwetha Sorensen MD        0.9 % sodium chloride infusion   IntraVENous Continuous Yariel Turner MD 50 mL/hr at 10/27/22 0539 New Bag at 10/27/22 0539    trimethobenzamide (TIGAN) injection 200 mg  200 mg IntraMUSCular Q6H PRN Helga Cotter MD   200 mg at 10/24/22 2248    morphine (PF) injection 2 mg  2 mg IntraVENous Q4H PRN Helga Cotter MD        aspirin EC tablet 81 mg  81 mg Oral Daily Shwetha Sorensen MD   81 mg at 10/27/22 0850    melatonin tablet 6 mg  6 mg Oral Daily Shwetha Sorensen MD   6 mg at 10/26/22 2128    meloxicam (MOBIC) tablet 15 mg  15 mg Oral Daily Shwetha Sorensen MD   15 mg at 10/27/22 0850    pantoprazole (PROTONIX) tablet 40 mg  40 mg Oral QAM AC Shwetha Sorensen MD   40 mg at 10/27/22 0604    rosuvastatin (CRESTOR) tablet 40 mg  40 mg Oral Daily Shwetha Sorensen MD   40 mg at 10/27/22 0850    traZODone (DESYREL) tablet 50 mg  50 mg Oral Nightly Shwetha Sorensen MD   50 mg at 10/26/22 2128    metronidazole (FLAGYL) 500 mg in 0.9% NaCl 100 mL IVPB premix  500 mg IntraVENous Q8H Shwetha Sorensen MD   Stopped at 10/27/22 1332    enoxaparin (LOVENOX) injection 40 mg  40 mg SubCUTAneous Daily Phoebe Piyush Sorensen MD   40 mg at 10/27/22 0854        No Known Allergies    ROS:   Constitutional: Negative for fever. Positive for activity change and appetite change. HENT: Negative for epistaxis.    Eyes: Negative for diploplia, blurred vision. Respiratory: Negative for cough, chest tightness, shortness of breath and wheezing. Cardiovascular: pertinent positives in HPI  Gastrointestinal: Positive for for abdominal pain and negative for blood in stool. All other review of systems are negative     PHYSICAL EXAM:   Vitals:    10/26/22 1438 10/26/22 2115 10/27/22 0752 10/27/22 1441   BP: 114/76 (!) 141/90 117/73 114/82   Pulse: 91 (!) 112 (!) 104 100   Resp: 23 18 18    Temp: 97.4 °F (36.3 °C) 98.2 °F (36.8 °C) 98.4 °F (36.9 °C) 96.8 °F (36 °C)   TempSrc: Temporal Oral Oral Temporal   SpO2: 92% 98% 94% 94%   Weight:       Height:          Constitutional: Well-developed, no acute distress  Eyes: conjunctivae normal, no xanthelasma   Ears, Nose, Throat: oral mucosa moist, no cyanosis   CV: no JVD. IRIR. No murmurs, rubs, or gallops. PMI is nondisplaced  Lungs: clear to auscultation bilaterally, normal respiratory effort without used of accessory muscles  Abdomen: soft, tender at lower abdomen, bowel sounds present, no masses or hepatomegaly   Musculoskeletal: no digital clubbing, no edema   Skin: warm, no rashes     I have personally reviewed the laboratory, cardiac diagnostic and radiographic testing as outlined below:    Data:    Recent Labs     10/25/22  0530 10/26/22  0629 10/27/22  0630   WBC 7.3 7.6 7.5   HGB 13.4 12.1* 12.3*   HCT 38.3 35.4* 35.9*    175 203     Recent Labs     10/25/22  0530 10/26/22  0629 10/27/22  0630   * 131* 133   K 4.2 3.3* 3.5   CL 98 98 98   CO2 21* 22 20*   BUN 10 6 5*   CREATININE 0.8 0.7 0.6*   CALCIUM 8.2* 7.7* 7.9*      Lab Results   Component Value Date/Time    MG 2.0 10/27/2022 06:30 AM     No results for input(s): TSH in the last 72 hours. No results for input(s): INR in the last 72 hours. CXR 10/24/2022:      FINDINGS:   Single AP upright portable chest demonstrate satisfactory expansion lungs   which are clear. The cardiac silhouette appears unremarkable.   There is no   evidence of a pneumothorax. Impression   No acute cardiopulmonary process. Telemetry: AF rates around  bpm    EKG 10/24/22:  bpm, Qtc 472 ms. Please see scan in Cardiology. Echocardiogram 10/11/22: Findings      Left Ventricle   Normal left ventricle size. Normal left ventricle wall thickness. No wall motion abnormalities. Indeterminate diastolic function. Ejection fraction is visually estimated at 55-60%. Right Ventricle   Normal right ventricle structure and function. Left Atrium   The left atrium is mildly dilated by LISA. Right Atrium   Mildly enlarged right atrium size. Mitral Valve   Physiologic and/or trace mitral regurgitation is present. Tricuspid Valve   Normal tricuspid valve structure and function. RVSP is 35 mmHg. Aortic Valve   Trileaflet aortic valve. Normal leaflet mobility. No aortic stenosis. No aortic regurgitation. Pulmonic Valve   Normal pulmonic valve structure and function. Pericardial Effusion   No evidence for hemodynamically significant pericardial effusion. Aorta   Normal aortic root. Miscellaneous   The inferior vena cava diameter is normal with normal respiratory   variation. Conclusions      Summary   No significant valvular abnormalities. Normal left ventricle size. Ejection fraction is visually estimated at 55-60%. Mild bi-atrial enlargement   Atrial fibrillation      Signature      ----------------------------------------------------------------   Electronically signed by Alysia Velez MD(Interpreting   physician) on 10/11/2022 11:43 AM   ----------------------------------------------------------------     Telemetry 10/24/22 : AF  bpm     Stress Test 11/15/2021:                                                              Exercise ECG:   The patient demonstrated rare PVC during exercise.  Occassional PVC's at rest.     With exercise, 1 mm downsloping ST depression in the inferior leads and 1 mm horizontal ST depression in the anterolateral leads. With exercise up to 0.5 mm mm of downsloping ST depression was noted in leads I, II, III, AVF,: And a 0.5 mm horizontal ST depression in V4, V5 and V6 with initial changes beginning at 5 minutes into exercise, at a heart rate of 138. These changes resolved shortly during recovery. The predictive value for ischemia was low . Shetty treadmill score was 6 implying low  risk. IMAGING: Myocardial perfusion imaging was performed at rest 30-35 minutes following the intravenous injection of 10.4 mCi of (Tc-Sestamibi) followed by 10 ml of Normal Saline. At peak exercise, the patient was injected intravenously with 33.3 mCi of (Tc-Sestamibi) followed by 10 ml of Normal Saline. Gated post-stress tomographic imaging was performed 20-25 minutes after stress. FINDINGS: The overall quality of the study was good. Left ventricular cavity size was noted to be normal.     Rotational analog analysis demonstrated soft tissue diaphragmatic attenuation. The gated SPECT stress imaging in the short, vertical long, and horizontal long axis demonstrated normal homogeneous tracer distribution throughout the myocardium. With left ventricular ejection fraction of 75% and no wall motion abnormalities. Impression:    Exercise EKG was negative. The patient experienced no chest pain with exercise. The myocardial perfusion imaging was normal.    Overall left ventricular systolic function was normal without regional wall motion abnormalities. Shetty treadmill score was 6 implying low risk. Exercise capacity was average. Low risk general exercise treadmill test.    I have independently reviewed all of the ECGs and rhythm strips per above     Assessment/Plan:     1.  Longstanding persistent atrial fibrillation   - First noted 2011 per the patient reported  - First documented 2015 on available EKG  - CHADS-VASc of 0 on ASA  - Home Atenolol changed to Toprol 10/25/2022  - No history of rhythm control with AAD, DCCV or ablation.   - Presented in AF with RVR in the setting of acute diverticulitis   - Ongoing AF with controled rates. 2. Acute diverticulitis  - Diagnosed on CT abdomen 10/24/2022  - Possible microperforation.  - On IV Flagyl and Rocephin.  - Follows by general surgery . 3. GERD  - On Prilosec     4. Hyperlipdemia   - On Crestor at home     5. Tubular adenoma  - Found on colonoscopy in 2017     6. Cervical radiculopathy   - On Mobic and Trazodone     Recommendations:  Stop IV Cardizem. Agree with up titration of Toprol 100mg BID  3. If he is officially diagnosed with hypertension in the future, he would likely benefit from [facility] for stroke risk reduction. I have spent a total of 10 minutes with the patient and the family reviewing the above stated recommendations. And a total of >50% of that time involved face-to-face time providing counseling and or coordination of care with the other providers. Thank you for allowing me to participate in your patient's care. Please call me if there are any questions or concerns. Discussed with Dr Adelso Huffman. GULSHAN Prajapati - CNP  Cardiac Electrophysiology  [facility]) Physicians  [facility] [place]: [address] [facility],  [address] Electrophysiology  3:20 PM  10/27/2022    Attending Physician's Statement    Patient seen with the ANP. Agree with the findings, assessment and plan. Management plan was discussed. I have personally interviewed the patient, independently performed a focused cardiac examination, reviewed the pertinent laboratory and diagnostic testing with the patient and directly participated in the medical decision-making as noted above with the following additions:     Feeling better. Denies any chest pain, dyspnea or palpitations. Remains in AF  bpm on IV Cardizem.     Physical exam showed no JVD, IRIR, no murmur, clear lungs bilaterally, trace edema    Discontinue IV Cardizem. Increase Toprol XL to 100 mg bid. OK to discharge home per EP perspectives. Follow up in 1 week for EKG and with provider in 1 month. EP will sign off. Please call for any questions or concerns. I have spent a total of 35 minutes with the patient and the family reviewing the above stated recommendations. And a total of >50% of that time involved face-to-face time providing counseling and/or coordination of care with the other providers, reviewing records/tests, counseling/education of the patient, ordering medications/tests/procedures, coordinating care, and documenting clinical information in the EHR.      Bria Campos MD  Cardiac Electrophysiology  [facility]  [facility] [place]: [facility] Electrophysiology  5:34 PM  10/27/2022

## 2022-10-27 NOTE — PROGRESS NOTES
[facility]  Family Medicine Attending    S: 58 y.o. male with acute diverticulitis and afib rvr. He had abdominal pain on admission that is now improving with the addition of antibiotics. His nausea is also improving. He is starting to do better with diet; appetite is low but improving. He was placed on a cardizem gtt in the ED for the afib. EPS is also following. Patient's atenolol was stopped and metoprolol was started twice daily. There has been some improvement in his tachycardia, but cardizem not completed weaned due to tachycardia despite the 50 mg of toprol BID. Patient denies any chest pain but continues to notice the palpitations from time to time. Those monitor is showing persistent atrial fibrillation with frequent PVCs. C. difficile was found to be negative. He denies any current abdominal pain , but BMs remain loose. No blood noted in the stools. Intermittent GERD symptoms have been present. O: VS- Blood pressure 117/73, pulse (!) 104, temperature 98.4 °F (36.9 °C), temperature source Oral, resp. rate 18, height 6' (1.829 m), weight 185 lb (83.9 kg), SpO2 94 %. Exam is as noted by resident with the following changes, additions or corrections:  Gen:  AAO  Resp:  CTAB  CVS:  irregularly irregular  Abd:  soft, no further tenderness in the abdomen, no rebound or guarding. Impressions:  Acute diverticulitis-improving  Atrial fibrillation with rapid ventricular response (HCC)-improving  Paroxysmal atrial fibrillation (HCC)-chronic  Hyponatremia-resolved  Dehydration-resolved  Gerd-improving    Plan:      Continue abx-rocephin and flagyl-plan to complete 5 days of IV abx, then switch to PO cipro and flagyl. Resumed home medications  Fluid bolus given due to RVR as well as persistent low UO/dehydration and hyponatremia-improved urine output.  P.o. intake encouraged; hyponatremia resolved.   Gen surg consulted-plans for outpatient colonoscopy  EPS following  Will increase toprol to 100 mg BID  Protonix for GERD symptoms; will also add maalox prn. Attending Physician Statement  I have reviewed the chart and seen the patient with the resident(s). I personally reviewed images, EKG's and similar tests, if present. I personally reviewed and performed key elements of the history and exam.  I have reviewed and confirmed student and/or resident history and exam with changes as indicated above. I agree with the assessment, plan and orders as documented by the resident. Please refer to the resident and/or student note for additional information.     Alexis Hermosillo MD

## 2022-10-27 NOTE — PROGRESS NOTES
GENERAL SURGERY  DAILY PROGRESS NOTE  10/27/2022    CHIEF COMPLAINT:  Chief Complaint   Patient presents with    Abdominal Pain     Lower abdomen for 3 days     Diarrhea     Since Friday         SUBJECTIVE:  No abdominal pain this morning. Tolerating diet having bowel function, no cp sob,     OBJECTIVE:  BP (!) 141/90   Pulse (!) 112   Temp 98.2 °F (36.8 °C) (Oral)   Resp 18   Ht 6' (1.829 m)   Wt 185 lb (83.9 kg)   SpO2 98%   BMI 25.09 kg/m²     GENERAL:  NAD. A&Ox3. HEENT NCAT  LUNGS:  No increased work of breathing BS clear b/l   CARDIOVASCULAR: A. Fib, no extra heart sounds   ABDOMEN:  Soft, non-distended, no tender  EXT NVI no edema       ASSESSMENT/PLAN:  58 y.o. male with acute sigmoid diverticulitis with contained perforation,     Diet  Pain control   Continue IV abx   Nausea control as needed   Chronic afib- ep following  After discussions with the patient 10/25, we will proceed with nonoperative management  Outpatient colonoscopy    [facility] for [facility] at discharge     Electronically signed by Jewel Michel MD on 10/27/2022 at 7:16 AM    Attending Attestation   Patient seen and examined, agree with resident note except for changes made by me, for remaining HP/Consult/progress note details please see resident HP/Consult/progress note. - doing well, ok for dc when ready to go. From my POV he can be anticoagulated as indicated. - will s/o but don't hesitate to call if there are any issues.      Mike Caceres MD

## 2022-10-27 NOTE — PROGRESS NOTES
[facility] - Family [facility] Inpatient   Resident Progress Note    S:  Hospital day: 3    Brief Synopsis: Nasir Tao is a 58 y.o. male with a PMH of Afib on ASA 81, HLD on Crestor 40mg, GERD, anxiety, diverticulosis, tubular adenoma found on last colonoscopy in 2017. The patient presented for abdominal pain with diarrhea. Since Friday night, patient has been having abdominal pain. It is sharp, localized on the lower abdomen and radiated nowhere else. He had multiple bouts of watery brown diarrhea. He denies blood or mucus in the stool. On Monday, he woke up with lightheadedness and heart palpitations. In the ED, pt had a HR of 120 and found to be in Afib RVR. He was started on diltiazem drip. IV Rocephin and Metronidazole were started after CT showed acute diverticulitis with mircoperforation. Pain continued to decrease. Metoprolol 100 mg twice daily was started to control the HR. Overnight, HR was 90-100s. Patient was able to ambulate without difficulties and pain. Patient had an episode of bad acid reflux before dinner. He was able to eat but continued to have no appetite. Had 1 more bout of diarrhea this morning. No other complaints. Patient was examined, continue to have irregular tachycardia.       Cont meds:    dilTIAZem 5 mg/hr (10/27/22 1056)    sodium chloride 50 mL/hr at 10/27/22 0539     Scheduled meds:    metoprolol succinate  100 mg Oral BID    lactobacillus  1 capsule Oral Daily    cefTRIAXone (ROCEPHIN) IV  2,000 mg IntraVENous Q24H    fentanNYL  50 mcg IntraVENous Once    sodium chloride flush  5-40 mL IntraVENous 2 times per day    aspirin  81 mg Oral Daily    melatonin  6 mg Oral Daily    meloxicam  15 mg Oral Daily    pantoprazole  40 mg Oral QAM AC    rosuvastatin  40 mg Oral Daily    traZODone  50 mg Oral Nightly    metroNIDAZOLE  500 mg IntraVENous Q8H    enoxaparin  40 mg SubCUTAneous Daily     PRN meds: calcium carbonate, loperamide, sodium chloride flush, polyethylene glycol, acetaminophen **OR** acetaminophen, trimethobenzamide, morphine     I reviewed the patient's Past Medical and Surgical History, Medications and Allergies. O:  VS: /73   Pulse (!) 104   Temp 98.4 °F (36.9 °C) (Oral)   Resp 18   Ht 6' (1.829 m)   Wt 185 lb (83.9 kg)   SpO2 94%   BMI 25.09 kg/m²     Physical Exam:  Physical Exam  Constitutional:       General: He is not in acute distress. Cardiovascular:      Rate and Rhythm: Tachycardia present. Rhythm irregular. Heart sounds: No murmur heard. Pulmonary:      Effort: Pulmonary effort is normal.      Breath sounds: Normal breath sounds. Abdominal:      General: There is no distension. Palpations: Abdomen is soft. Tenderness: There is no abdominal tenderness (mild tenderness in the LLQ). Comments: No bowel sounds appreciated   Musculoskeletal:      Right lower leg: No edema. Left lower leg: No edema. Skin:     General: Skin is warm and dry. Neurological:      General: No focal deficit present. Mental Status: He is alert and oriented to person, place, and time. Labs:  Na/K/Cl/CO2:  133/3.5/98/20 (10/27 0630)  BUN/Cr/glu/ALT/AST/amyl/lip:  5/0.6/--/--/--/--/-- (10/27 0630)  WBC/Hgb/Hct/Plts:  7.5/12.3/35.9/203 (10/27 0630)  estimated creatinine clearance is 140 mL/min (A) (based on SCr of 0.6 mg/dL (L)). Other pertinent labs as noted below    New Imaging:  CTA ABDOMEN PELVIS W CONTRAST   Final Result   Abnormal wall thickening identified of the sigmoid colon to suggest and acute   sigmoid diverticulitis with extensive surrounding stranding and fluid without   loculation at this time. There is extraluminal air to suggest a perforation. There is adjacent wall thickening of the small bowel and bladder from the   surrounding inflammatory changes with dilatation of the small bowel   proximally as well as dilatation of the stomach. Mild ileus cannot be   excluded. Minimal vascular atherosclerotic disease. No evidence of significant   stenosis or thrombus within the vessels. Findings were discussed with Dr. Lukas Webster at 11:27 a.m. on 10/24/2022         XR CHEST PORTABLE   Final Result   No acute cardiopulmonary process. A/P:  Principal Problem:    Acute diverticulitis  Active Problems:    Atrial fibrillation with rapid ventricular response (HCC)    Paroxysmal atrial fibrillation (HCC)  Resolved Problems:    * No resolved hospital problems. *      Acute diverticulitis  -WBC 15.9, lactic acid 3.5, Na 127, Cl 88 on admission  -CT showed acute diverticulitis with possible microperforation, Rocephin x1 and Metronidazole x1 given in the ED  -Day 4 of Rocephin 1g daily and Metronidazole 500mg q8h, complete the 5 days of IV antibiotics and transition to p.o. antibiotics for the next 14 days  -Gen Surg signed off  -low fiber diet as tolerated  -continue home meds  -BMP and CBC daily  -imodium given for diarrhea since C.  Diff was negative     Afib w RVR  -pt had paroxysmal Afib diagnosed for 11 years, last ECHO done on 10/11/2022 showing EF 55-60% and mild atrial enlargement  -HR at 120 at admission, only controlled with diltiazem drip  -continue diltiazem drip, wean as tolerated  -consulted EP as pt is known to Dr Todd Turner  -placed on telemetry  -continue ASA 81, metoprolol increased to 100mg BID     Hyponatremia - resolved  -possibly due to low PO intake since Friday, 2 boluses of IV NS given in the ED  -Na 1333 today, omaintenance fluid as well  -BMP daily     Lactic acidosis - resolved  -lactic acid 3.5 on admission, normalized to 1.5 after IVF and IV ATB  -monitor for signs of infection     Elevated troponin - resolved  -troponin at 12 on admission, possibly due to Afib  -last stress test was in Nov 2021, showed low risk of ACS  -consider consulting cardiology if not improving  -repeat troponin at 9     Elevated bilirubin  -possibly due to low PO intake and dehydration  -will continue to monitor     Hx of HLD  -continue Crestor 40mg     Hx of GERD  -continue PO Protonix 40mg     Hx of Insomnia  -continue home trazodone and melatonin     Hx of cervical radiculopathy, left arm weakness  -continue Meloxicam  -morphine PRN for pain control     11.  Hx of DMT2 on diet control  -elevated glucose to 195 at admission  -HbA1C at 6.0 in 5/2022, rechecked at 6.2  -continue to monitor     PT/OT evaluation: none  DVT prophylaxis: Lovenox 40mg daily  GI prophylaxis: Protonix 40mg daily  Diet: Full liquid diet, advance if tolerated  Code Full  Disposition: home      Electronically signed by Lanre De Souza MD on 10/27/2022 at 12:11 PM  This case was discussed with attending physician Dr. Saira Colby

## 2022-10-28 VITALS
OXYGEN SATURATION: 97 % | HEIGHT: 72 IN | WEIGHT: 185 LBS | BODY MASS INDEX: 25.06 KG/M2 | SYSTOLIC BLOOD PRESSURE: 131 MMHG | HEART RATE: 95 BPM | DIASTOLIC BLOOD PRESSURE: 69 MMHG | TEMPERATURE: 97.4 F | RESPIRATION RATE: 18 BRPM

## 2022-10-28 LAB
ANION GAP SERPL CALCULATED.3IONS-SCNC: 11 MMOL/L (ref 7–16)
BASOPHILS ABSOLUTE: 0 E9/L (ref 0–0.2)
BASOPHILS RELATIVE PERCENT: 0.4 % (ref 0–2)
BUN BLDV-MCNC: 5 MG/DL (ref 6–23)
BURR CELLS: ABNORMAL
CALCIUM SERPL-MCNC: 7.8 MG/DL (ref 8.6–10.2)
CHLORIDE BLD-SCNC: 98 MMOL/L (ref 98–107)
CO2: 24 MMOL/L (ref 22–29)
CREAT SERPL-MCNC: 0.7 MG/DL (ref 0.7–1.2)
EKG ATRIAL RATE: 91 BPM
EKG Q-T INTERVAL: 368 MS
EKG QRS DURATION: 92 MS
EKG QTC CALCULATION (BAZETT): 457 MS
EKG R AXIS: 25 DEGREES
EKG T AXIS: 16 DEGREES
EKG VENTRICULAR RATE: 93 BPM
EOSINOPHILS ABSOLUTE: 0.28 E9/L (ref 0.05–0.5)
EOSINOPHILS RELATIVE PERCENT: 3.4 % (ref 0–6)
GFR SERPL CREATININE-BSD FRML MDRD: >60 ML/MIN/1.73
GLUCOSE BLD-MCNC: 101 MG/DL (ref 74–99)
HCT VFR BLD CALC: 36.4 % (ref 37–54)
HEMOGLOBIN: 12.6 G/DL (ref 12.5–16.5)
LYMPHOCYTES ABSOLUTE: 0.08 E9/L (ref 1.5–4)
LYMPHOCYTES RELATIVE PERCENT: 0.9 % (ref 20–42)
MAGNESIUM: 2.1 MG/DL (ref 1.6–2.6)
MCH RBC QN AUTO: 30 PG (ref 26–35)
MCHC RBC AUTO-ENTMCNC: 34.6 % (ref 32–34.5)
MCV RBC AUTO: 86.7 FL (ref 80–99.9)
MONOCYTES ABSOLUTE: 0.5 E9/L (ref 0.1–0.95)
MONOCYTES RELATIVE PERCENT: 6 % (ref 2–12)
NEUTROPHILS ABSOLUTE: 7.47 E9/L (ref 1.8–7.3)
NEUTROPHILS RELATIVE PERCENT: 89.7 % (ref 43–80)
OVALOCYTES: ABNORMAL
PDW BLD-RTO: 13.8 FL (ref 11.5–15)
PLATELET # BLD: 212 E9/L (ref 130–450)
PMV BLD AUTO: 9.9 FL (ref 7–12)
POIKILOCYTES: ABNORMAL
POTASSIUM REFLEX MAGNESIUM: 3.5 MMOL/L (ref 3.5–5)
RBC # BLD: 4.2 E12/L (ref 3.8–5.8)
SODIUM BLD-SCNC: 133 MMOL/L (ref 132–146)
TOXIC GRANULATION: ABNORMAL
WBC # BLD: 8.3 E9/L (ref 4.5–11.5)

## 2022-10-28 PROCEDURE — 6370000000 HC RX 637 (ALT 250 FOR IP): Performed by: FAMILY MEDICINE

## 2022-10-28 PROCEDURE — 93010 ELECTROCARDIOGRAM REPORT: CPT | Performed by: INTERNAL MEDICINE

## 2022-10-28 PROCEDURE — 36415 COLL VENOUS BLD VENIPUNCTURE: CPT

## 2022-10-28 PROCEDURE — 6360000002 HC RX W HCPCS

## 2022-10-28 PROCEDURE — 6370000000 HC RX 637 (ALT 250 FOR IP)

## 2022-10-28 PROCEDURE — 85025 COMPLETE CBC W/AUTO DIFF WBC: CPT

## 2022-10-28 PROCEDURE — 83735 ASSAY OF MAGNESIUM: CPT

## 2022-10-28 PROCEDURE — 6370000000 HC RX 637 (ALT 250 FOR IP): Performed by: STUDENT IN AN ORGANIZED HEALTH CARE EDUCATION/TRAINING PROGRAM

## 2022-10-28 PROCEDURE — 2500000003 HC RX 250 WO HCPCS

## 2022-10-28 PROCEDURE — 99239 HOSP IP/OBS DSCHRG MGMT >30: CPT | Performed by: FAMILY MEDICINE

## 2022-10-28 PROCEDURE — 2580000003 HC RX 258

## 2022-10-28 PROCEDURE — 80048 BASIC METABOLIC PNL TOTAL CA: CPT

## 2022-10-28 RX ORDER — METOPROLOL SUCCINATE 100 MG/1
100 TABLET, EXTENDED RELEASE ORAL 2 TIMES DAILY
Qty: 30 TABLET | Refills: 3 | Status: SHIPPED | OUTPATIENT
Start: 2022-10-28 | End: 2022-11-10 | Stop reason: ALTCHOICE

## 2022-10-28 RX ORDER — CALCIUM CARBONATE 200(500)MG
500 TABLET,CHEWABLE ORAL 2 TIMES DAILY PRN
Qty: 30 TABLET | Refills: 0 | Status: SHIPPED | OUTPATIENT
Start: 2022-10-28 | End: 2022-11-17

## 2022-10-28 RX ORDER — LOPERAMIDE HYDROCHLORIDE 2 MG/1
2 CAPSULE ORAL 4 TIMES DAILY PRN
Qty: 20 CAPSULE | Refills: 1 | Status: SHIPPED | OUTPATIENT
Start: 2022-10-28 | End: 2022-11-07

## 2022-10-28 RX ORDER — CIPROFLOXACIN 500 MG/1
500 TABLET, FILM COATED ORAL 2 TIMES DAILY
Qty: 18 TABLET | Refills: 0 | Status: SHIPPED | OUTPATIENT
Start: 2022-10-28 | End: 2022-11-06

## 2022-10-28 RX ORDER — METRONIDAZOLE 500 MG/1
500 TABLET ORAL 3 TIMES DAILY
Qty: 27 TABLET | Refills: 0 | Status: SHIPPED | OUTPATIENT
Start: 2022-10-28 | End: 2022-11-06

## 2022-10-28 RX ADMIN — ASPIRIN 81 MG: 81 TABLET, COATED ORAL at 09:32

## 2022-10-28 RX ADMIN — Medication 1 CAPSULE: at 09:33

## 2022-10-28 RX ADMIN — METRONIDAZOLE 500 MG: 5 INJECTION, SOLUTION INTRAVENOUS at 12:28

## 2022-10-28 RX ADMIN — METOPROLOL SUCCINATE 100 MG: 100 TABLET, EXTENDED RELEASE ORAL at 09:37

## 2022-10-28 RX ADMIN — METRONIDAZOLE 500 MG: 5 INJECTION, SOLUTION INTRAVENOUS at 04:20

## 2022-10-28 RX ADMIN — WATER 2000 MG: 1 INJECTION INTRAMUSCULAR; INTRAVENOUS; SUBCUTANEOUS at 09:34

## 2022-10-28 RX ADMIN — Medication 10 ML: at 09:35

## 2022-10-28 RX ADMIN — FLUTICASONE PROPIONATE 1 SPRAY: 50 SPRAY, METERED NASAL at 09:34

## 2022-10-28 RX ADMIN — ENOXAPARIN SODIUM 40 MG: 100 INJECTION SUBCUTANEOUS at 09:33

## 2022-10-28 RX ADMIN — MELOXICAM 15 MG: 7.5 TABLET ORAL at 09:32

## 2022-10-28 RX ADMIN — ROSUVASTATIN 40 MG: 20 TABLET, FILM COATED ORAL at 09:32

## 2022-10-28 RX ADMIN — PANTOPRAZOLE SODIUM 40 MG: 40 TABLET, DELAYED RELEASE ORAL at 05:27

## 2022-10-28 NOTE — PROGRESS NOTES
[facility]  Family Medicine Attending    S: 58 y.o. male with acute diverticulitis and afib rvr. He had abdominal pain on admission that is now improving with the addition of antibiotics. His nausea is also improving. He is starting to do better with diet; appetite is low but improving. He was placed on a cardizem gtt in the ED for the afib. EPS is also following. Patient's atenolol was stopped and metoprolol was started twice daily, then increased to 100 mg BID on 10/27/22. C. difficile was found to be negative. He denies any current abdominal pain, but BMs remain loose. No blood noted in the stools. Intermittent GERD symptoms have been present. O: VS- Blood pressure 138/85, pulse (!) 106, temperature 97.6 °F (36.4 °C), temperature source Temporal, resp. rate 18, height 6' (1.829 m), weight 185 lb (83.9 kg), SpO2 94 %. Exam is as noted by resident with the following changes, additions or corrections:  Gen:  AAO  Resp:  CTAB  CVS:  irregularly irregular  Abd:  soft, no further tenderness in the abdomen, no rebound or guarding. Impressions:  Acute diverticulitis-improving  Atrial fibrillation with rapid ventricular response (HCC)-improving  Paroxysmal atrial fibrillation (HCC)-chronic  Hyponatremia-resolved  Dehydration-resolved  Gerd-improving    Plan:      Continue abx-rocephin and flagyl-plan to complete 5 days of IV abx, then switch to PO cipro and flagyl. Resumed home medications   P.o. intake encouraged; hyponatremia resolved. Gen surg consulted-plans for outpatient colonoscopy 9patient does state that his last c-scope was about 3 months ago)  EPS signed off and will follow up as outpatient-appt on 11/17/22  Will increase toprol to 100 mg BID  Protonix for GERD symptoms; will also add maalox prn. Patient is stable for discharge later today. We will change antibiotics to PO after 2nd dose of flagyl today. Meds to beds and home on cipro and flagyl.   Continue metoprolol 100 mg BID at home. Patient to follow up with Dr. Lupe Clark next week     Attending Physician Statement  I have reviewed the chart and seen the patient with the resident(s). I personally reviewed images, EKG's and similar tests, if present. I personally reviewed and performed key elements of the history and exam.  I have reviewed and confirmed student and/or resident history and exam with changes as indicated above. I agree with the assessment, plan and orders as documented by the resident. Please refer to the resident and/or student note for additional information.     Teressa Mock MD

## 2022-10-28 NOTE — PROGRESS NOTES
[facility] - Family [facility] Inpatient   Resident Progress Note    S:  Hospital day: 4    Brief Synopsis: Diana Leal is a 58 y.o. male with a PMH of Afib on ASA 81, HLD on Crestor 40mg, GERD, anxiety, diverticulosis, tubular adenoma found on last colonoscopy in 2017. The patient presented for abdominal pain with diarrhea. Since Friday night, patient has been having abdominal pain. It is sharp, localized on the lower abdomen and radiated nowhere else. He had multiple bouts of watery brown diarrhea. He denies blood or mucus in the stool. On Monday, he woke up with lightheadedness and heart palpitations. In the ED, pt had a HR of 120 and found to be in Afib RVR. He was started on diltiazem drip. IV Rocephin and Metronidazole were started after CT showed acute diverticulitis with mircoperforation. Pain continued to decrease. Metoprolol 100 mg twice daily was started to control the HR. Overnight, EP came to see patient and stopped cardizem drip. They have since signed off. HR was 90-100s. Patient was examined this morning. He had an appetite until IV ATB was started, managed to have some alvina crackers. No acid reflux reported yesterday. Day 5 of 5 of IV ATB today, to be transitioned to PO ATB.       Cont meds:    sodium chloride Stopped (10/27/22 2037)     Scheduled meds:    metoprolol succinate  100 mg Oral BID    fluticasone  1 spray Each Nostril Daily    lactobacillus  1 capsule Oral Daily    cefTRIAXone (ROCEPHIN) IV  2,000 mg IntraVENous Q24H    fentanNYL  50 mcg IntraVENous Once    sodium chloride flush  5-40 mL IntraVENous 2 times per day    aspirin  81 mg Oral Daily    melatonin  6 mg Oral Daily    meloxicam  15 mg Oral Daily    pantoprazole  40 mg Oral QAM AC    rosuvastatin  40 mg Oral Daily    traZODone  50 mg Oral Nightly    metroNIDAZOLE  500 mg IntraVENous Q8H    enoxaparin  40 mg SubCUTAneous Daily     PRN meds: calcium carbonate, loperamide, sodium chloride flush, polyethylene glycol, acetaminophen **OR** acetaminophen, trimethobenzamide, morphine     I reviewed the patient's Past Medical and Surgical History, Medications and Allergies. O:  VS: /85   Pulse (!) 106   Temp 97.6 °F (36.4 °C) (Temporal)   Resp 18   Ht 6' (1.829 m)   Wt 185 lb (83.9 kg)   SpO2 94%   BMI 25.09 kg/m²     Physical Exam:  Physical Exam  Constitutional:       General: He is not in acute distress. Cardiovascular:      Rate and Rhythm: Tachycardia present. Rhythm irregular. Heart sounds: No murmur heard. Pulmonary:      Effort: Pulmonary effort is normal.      Breath sounds: Normal breath sounds. Abdominal:      General: There is no distension. Palpations: Abdomen is soft. Tenderness: There is no abdominal tenderness (mild tenderness in the LLQ). Comments: No bowel sounds appreciated   Musculoskeletal:      Right lower leg: No edema. Left lower leg: No edema. Skin:     General: Skin is warm and dry. Neurological:      General: No focal deficit present. Mental Status: He is alert and oriented to person, place, and time. Labs:  Na/K/Cl/CO2:  133/3.5/98/24 (10/28 4823)  BUN/Cr/glu/ALT/AST/amyl/lip:  5/0.7/--/--/--/--/-- (10/28 4939)  WBC/Hgb/Hct/Plts:  8.3/12.6/36.4/212 (10/28 2010)  estimated creatinine clearance is 120 mL/min (based on SCr of 0.7 mg/dL). Other pertinent labs as noted below    New Imaging:  CTA ABDOMEN PELVIS W CONTRAST   Final Result   Abnormal wall thickening identified of the sigmoid colon to suggest and acute   sigmoid diverticulitis with extensive surrounding stranding and fluid without   loculation at this time. There is extraluminal air to suggest a perforation. There is adjacent wall thickening of the small bowel and bladder from the   surrounding inflammatory changes with dilatation of the small bowel   proximally as well as dilatation of the stomach. Mild ileus cannot be   excluded. Minimal vascular atherosclerotic disease.   No evidence of significant   stenosis or thrombus within the vessels. Findings were discussed with Dr. Khushboo Barajas at 11:27 a.m. on 10/24/2022         XR CHEST PORTABLE   Final Result   No acute cardiopulmonary process. A/P:  Principal Problem:    Acute diverticulitis  Active Problems:    Atrial fibrillation with rapid ventricular response (HCC)    Paroxysmal atrial fibrillation (HCC)  Resolved Problems:    * No resolved hospital problems. *      Acute diverticulitis  -WBC 15.9, lactic acid 3.5, Na 127, Cl 88 on admission  -CT showed acute diverticulitis with possible microperforation, Rocephin x1 and Metronidazole x1 given in the ED  -Day 5 of Rocephin 1g daily and Metronidazole 500mg q8h, complete the 5 days of IV antibiotics and transition to p.o. antibiotics for the next 9 days for a total of 14 days. Since patient is to be discharged, his last dose of IV Flagyl will be continued in PO form at home tonight at 20:00.  -Gen Surg signed off  -low fiber diet as tolerated  -continue home meds  -BMP and CBC daily  -imodium given for diarrhea since C.  Diff was negative     Afib w RVR  -pt had paroxysmal Afib diagnosed for 11 years, last ECHO done on 10/11/2022 showing EF 55-60% and mild atrial enlargement  -HR at 120 at admission, only controlled with diltiazem drip  -diltiazem drip was discontinued by EP  -placed on telemetry  -continue ASA 81, metoprolol increased to 100mg BID     Hyponatremia - resolved  -possibly due to low PO intake since Friday, 2 boluses of IV NS given in the ED  -Na 133 today, omaintenance fluid as well  -BMP daily     Lactic acidosis - resolved  -lactic acid 3.5 on admission, normalized to 1.5 after IVF and IV ATB  -monitor for signs of infection     Elevated troponin - resolved  -troponin at 12 on admission, possibly due to Afib  -last stress test was in Nov 2021, showed low risk of ACS  -consider consulting cardiology if not improving  -repeat troponin at 9     Elevated bilirubin  -possibly due to low PO intake and dehydration  -will continue to monitor     Hx of HLD  -continue Crestor 40mg     Hx of GERD  -continue PO Protonix 40mg  -calcium carbonate PRN added     Hx of Insomnia  -continue home trazodone and melatonin     Hx of cervical radiculopathy, left arm weakness  -continue Meloxicam  -morphine PRN for pain control     11.  Hx of DMT2 on diet control  -elevated glucose to 195 at admission  -HbA1C at 6.0 in 5/2022, rechecked at 6.2  -continue to monitor     PT/OT evaluation: none  DVT prophylaxis: Lovenox 40mg daily  GI prophylaxis: Protonix 40mg daily  Diet: Full liquid diet, advance if tolerated  Code Full  Disposition: home      Electronically signed by Amparo Sanchez MD on 10/28/2022 at 8:51 AM  This case was discussed with attending physician Dr. Arina Marie

## 2022-10-28 NOTE — DISCHARGE SUMMARY
Discharge Summary    Zafar Thakur  :  1960  MRN:  [id number]    Admit date:  10/24/2022  Discharge date:      Admitting Physician:  Peña Van MD    Discharge Diagnoses:    Patient Active Problem List   Diagnosis    FH: colon cancer    GERD (gastroesophageal reflux disease)    Hypogonadism male    Hyperlipidemia    Paroxysmal atrial fibrillation ([facility]    Diverticulosis    Tubular adenoma    History of colon polyps    Adenomatous polyp of colon    Anxiety    Sleep disturbance    Chronic left shoulder pain    Family history of colon cancer    Acute diverticulitis    Atrial fibrillation with rapid ventricular response ([facility]       Admission Condition:  poor    Discharged Condition:  stable    Hospital Course:   Zafar Thakur is a 58 y.o. male with a PMH of Afib on ASA 81, HLD on Crestor 40mg, GERD, anxiety, diverticulosis, tubular adenoma found on last colonoscopy in . The patient presented for abdominal pain with diarrhea. Since Friday night, patient has been having abdominal pain. It is sharp, localized on the lower abdomen and radiated nowhere else. He had multiple bouts of watery brown diarrhea. He denies blood or mucus in the stool. On Monday, he woke up with lightheadedness and heart palpitations. In the ED, pt had a HR of 120 and found to be in Afib RVR. He was started on diltiazem drip. IV Rocephin and Metronidazole were started after CT showed acute diverticulitis with mircoperforation. He completed a 5 day course of IV ATB before switching to PO Ciprofloxacin and Metronidazole for the remaining 9 days. Abdominal pain subsided and patient was given imodium for the diarrhea when C.diff test was negative. For Afib, EP was consulted. Metoprolol 100 mg twice daily was started to control the HR, and atenolol was discontinued. Patient was weaned off diltiazem drip. He was discharged from the hospital to be followed up in the office with PCP and EP.     Discharge plan:  Patient to be discharged home with PO Ciprofloxacin and Metronidazole for a total of 14 days. HR thoughout stay in 80-100s range. Started on metoprolol succinate 100mg BID, discontinued atenolol. Blood pressure stable throughout hospital stay. If elevated in the future, consider oral anticoagulants for Afib. Follow up with Dr. Hemant Paniagua, General Surgery in 2 weeks. Follow up with Dr. Waleska Santos, PCP on 11/11/2022.       Discharge Medications:         Medication List        START taking these medications      calcium carbonate 500 MG chewable tablet  Commonly known as: TUMS  Take 1 tablet by mouth 2 times daily as needed for Heartburn     ciprofloxacin 500 MG tablet  Commonly known as: CIPRO  Take 1 tablet by mouth 2 times daily for 9 days     loperamide 2 MG capsule  Commonly known as: IMODIUM  Take 1 capsule by mouth 4 times daily as needed for Diarrhea     metoprolol succinate 100 MG extended release tablet  Commonly known as: TOPROL XL  Take 1 tablet by mouth in the morning and at bedtime     metroNIDAZOLE 500 MG tablet  Commonly known as: FLAGYL  Take 1 tablet by mouth 3 times daily for 9 days            CONTINUE taking these medications      aspirin 81 MG EC tablet     melatonin 3 MG Tabs tablet  Take 2 tablets by mouth daily     meloxicam 15 MG tablet  Commonly known as: MOBIC  Take 1 tablet by mouth daily     omeprazole 40 MG delayed release capsule  Commonly known as: PRILOSEC  Take 1 capsule by mouth Daily     rosuvastatin 40 MG tablet  Commonly known as: CRESTOR  Take 1 tablet by mouth daily     traZODone 50 MG tablet  Commonly known as: DESYREL  Take 1 tablet by mouth nightly            STOP taking these medications      atenolol 100 MG tablet  Commonly known as: TENORMIN               Where to Get Your Medications        These medications were sent to [facility] [address], [address] [address]      Phone: [phone]   calcium carbonate 500 MG chewable tablet  ciprofloxacin 500 MG tablet  loperamide 2 MG capsule  metoprolol succinate 100 MG extended release tablet  metroNIDAZOLE 500 MG tablet         Consults:  Electrophysiology    Significant Diagnostic Studies:     Labs:  Na/K/Cl/CO2:  133/3.5/98/24 (10/28 8270)  BUN/Cr/glu/ALT/AST/amyl/lip:  5/0.7/--/--/--/--/-- (10/28 2710)  WBC/Hgb/Hct/Plts:  8.3/12.6/36.4/212 (10/28 0546)  [unfilled]  estimated creatinine clearance is 120 mL/min (based on SCr of 0.7 mg/dL). New Imaging:  XR CERVICAL SPINE (4-5 VIEWS)    Result Date: 10/5/2022  EXAMINATION: 4 XRAY VIEWS OF THE CERVICAL SPINE 10/4/2022 11:33 am COMPARISON: None. HISTORY: ORDERING SYSTEM PROVIDED HISTORY: Cervical radiculopathy FINDINGS: No fracture or dislocation. Degenerative facet arthropathy is moderate to severe at multiple levels on the right and somewhat milder at multiple levels on the left. Degenerative disc disease is moderate at C6-7 and milder elsewhere. Normal soft tissues. Degenerative cervical spondylosis. XR SHOULDER LEFT (MIN 2 VIEWS)    Result Date: 9/28/2022  EXAMINATION: THREE XRAY VIEWS OF THE LEFT SHOULDER 9/28/2022 2:33 pm COMPARISON: None. HISTORY: ORDERING SYSTEM PROVIDED HISTORY: Impingement syndrome of left shoulder TECHNOLOGIST PROVIDED HISTORY: Reason for exam:->pain FINDINGS: Glenohumeral joint is normally aligned. No evidence of acute fracture or dislocation. No abnormal periarticular calcifications. The [facility] joint is unremarkable in appearance. Visualized lung is unremarkable. No acute abnormality. XR CHEST PORTABLE    Result Date: 10/24/2022  EXAMINATION: ONE XRAY VIEW OF THE CHEST 10/24/2022 8:44 am COMPARISON: None. HISTORY: ORDERING SYSTEM PROVIDED HISTORY: tachy TECHNOLOGIST PROVIDED HISTORY: Reason for exam:->tachy What reading provider will be dictating this exam?->CRC FINDINGS: Single AP upright portable chest demonstrate satisfactory expansion lungs which are clear.   The cardiac silhouette appears unremarkable. There is no evidence of a pneumothorax. No acute cardiopulmonary process. CTA ABDOMEN PELVIS W CONTRAST    Result Date: 10/24/2022  EXAMINATION: CTA OF THE ABDOMEN AND PELVIS WITH CONTRAST 10/24/2022 9:58 am: TECHNIQUE: CTA of the abdomen and pelvis was performed with the administration of intravenous contrast. Multiplanar reformatted images are provided for review. MIP images are provided for review. Automated exposure control, iterative reconstruction, and/or weight based adjustment of the mA/kV was utilized to reduce the radiation dose to as low as reasonably achievable. COMPARISON: None. HISTORY: ORDERING SYSTEM PROVIDED HISTORY: afib, RLQ, periumbilical, and LUQ abdominal pain out of proportion to exam, r/o mesenteric ischemia TECHNOLOGIST PROVIDED HISTORY: Reason for exam:->afib, RLQ, periumbilical, and LUQ abdominal pain out of proportion to exam, r/o mesenteric ischemia Decision Support Exception - unselect if not a suspected or confirmed emergency medical condition->Emergency Medical Condition (MA) What reading provider will be dictating this exam?->CRC FINDINGS: CTA ABDOMEN: The lung bases are grossly clear with atelectatic changes seen in dependent portion lung bases bilaterally. The liver is homogeneous in appearance. The spleen is unremarkable. No stones in the gallbladder. The pancreas is homogeneous in appearance. Both adrenal glands are within normal limits. Symmetric enhancement of the renal parenchyma. No stones or distension seen in the renal collecting system. The stomach is distended with fluid and air-fluid level identified. There is dilated proximal and mid small bowel loops. There is decompression of the distal small bowel loops. The mid small both bowel loops lie adjacent to inflammatory changes within the left lower quadrant likely from a diverticulitis and inflammation. Findings concerning for an ileus.   There is no significant wall thickening identified the small bowel. There is good enhancement identified of the mucosa of the small bowel. There is abnormal wall thickening of a short segment of sigmoid colon with multiple diverticulum identified and surrounding stranding to suggest a complicated acute sigmoid diverticulitis. There is extraluminal air to suggest perforation with some fluid. No abdominal retroperitoneal lymphadenopathy. The abdominal aorta reveals minimal atherosclerotic disease. Patency identified of the celiac axis as well as the SMA. No thrombus or filling defect within the mesenteric vessels. There is mild atherosclerotic disease of the renal vessels with a single renal artery identified bilaterally. The CHELA is patent. Minimal atherosclerotic disease of the iliac vessels. Minimal fluid identified in the pericolic gutter on the right and left. No ventral abdominal wall mass or defect. Degenerative changes seen within the spine and pelvis. CTA PELVIS: Pelvic organs reveal mild wall thickening of the bladder likely related to the surrounding inflammatory changes. The prostate is unremarkable. Degenerative changes seen within the spine and pelvis. No ventral abdominal wall mass or defect. Abnormal wall thickening identified of the sigmoid colon to suggest and acute sigmoid diverticulitis with extensive surrounding stranding and fluid without loculation at this time. There is extraluminal air to suggest a perforation. There is adjacent wall thickening of the small bowel and bladder from the surrounding inflammatory changes with dilatation of the small bowel proximally as well as dilatation of the stomach. Mild ileus cannot be excluded. Minimal vascular atherosclerotic disease. No evidence of significant stenosis or thrombus within the vessels.  Findings were discussed with Dr. Christy Pascual at 11:27 a.m. on 10/24/2022         Treatments:   as above    Discharge Exam:  Please refer to last progress note    Disposition: home    Future Appointments   Date Time Provider Matthew Anca   11/2/2022  1:30 PM DO GEMINI Reynoso   11/10/2022  8:15 AM [facility]   11/17/2022 11:30 AM MD Carli Ruby [facility]       More than 30 minutes was spent in preparation of this patient's discharge including, but not limited to, examination, preparation of documents, prescription preparation, counseling and coordination.     Donny Rodriguez MD  10/28/2022, 1:37 PM

## 2022-10-28 NOTE — PROGRESS NOTES
CLINICAL PHARMACY NOTE: MEDS TO BEDS    Total # of Prescriptions Filled: 5   The following medications were delivered to the patient:  Metoprolol succinate 200 mg  Ciprofloxacin 500 mg  Metronidazole 500 mg  Loperamide 2 mg  Rajinder-gest antacid 500 mg chewable  Delivered to pt @ 4:18p    Additional Documentation:

## 2022-10-28 NOTE — DISCHARGE INSTRUCTIONS
Start taking Flagyl and Ciprofloxacin at 20:00 on 10/28/2022. DISCHARGE INSTRUCTIONS - FAMILY MEDICINE    Follow up with PCP Dr. Tayo Berg. If there are any issues and cannot present to your appointment, please contact his office or call us at the [facility] at [phone] and we will schedule a new appointment for you. Future Appointments   Date Time Provider Matthew March   11/2/2022  1:30 PM DO GEMINI Phelps   11/10/2022  8:15 AM Petra Homans, MD [facility]   11/17/2022 11:30 AM Maurice Miranda [place] [id number] [address]:  [address] [place] [address], [address]         Phone: [phone]    Once discharged from [facility], you can :    Return to work : Yes, you may return to work  Activity : As tolerated  Stairs : As tolerated  Exercise : As tolerated  Lifting : As tolerated   Sexual activity : Yes  Driving : With seat belt on. NO driving on narcotic pain medication if prescribed   Medications : Always take your medications as prescribed  Wound Care: none needed  Diet : You are asked to make an attempt to improve diet and exercise patterns to aid in medical management of your medical condition/problem. Call [facility] with any further questions. Return to Emergency Department with any worsening of your condition and/or fever greater than 101 degrees, new weakness, shortness of breath or chest pain.  ______________________________________________________________________    =====================================   [facility], [address] [place] [address]   =====================================   Take your medications as directed in this summary     Follow up with all your future appointments as advised   Call [phone] to confirm your appointment with [facility]) as soon as possible and/or if there are any appointment changes or other issues.      It is important that you follow up with us for better monitoring of the current cause of your hospitalization. Follow up as well with the specialists that saw you during your stay. If you have any questions call us [phone].

## 2022-10-31 ENCOUNTER — TELEPHONE (OUTPATIENT)
Dept: NON INVASIVE DIAGNOSTICS | Age: 62
End: 2022-10-31

## 2022-10-31 NOTE — TELEPHONE ENCOUNTER
Patient called because his ankles are swollen. I told him to call his pcp. Patient verbalized understanding.

## 2022-11-01 ENCOUNTER — HOSPITAL ENCOUNTER (EMERGENCY)
Age: 62
Discharge: HOME | End: 2022-11-01
Payer: COMMERCIAL

## 2022-11-01 VITALS
BODY MASS INDEX: 25.19 KG/M2 | SYSTOLIC BLOOD PRESSURE: 130 MMHG | WEIGHT: 186 LBS | RESPIRATION RATE: 16 BRPM | HEIGHT: 72 IN | TEMPERATURE: 97.7 F | HEART RATE: 83 BPM | DIASTOLIC BLOOD PRESSURE: 60 MMHG | OXYGEN SATURATION: 100 %

## 2022-11-01 DIAGNOSIS — R60.9 DEPENDENT EDEMA: Primary | ICD-10-CM

## 2022-11-01 LAB
ANION GAP SERPL CALCULATED.3IONS-SCNC: 9 MMOL/L (ref 7–16)
BACTERIA: NORMAL /HPF
BASOPHILS ABSOLUTE: 0.04 E9/L (ref 0–0.2)
BASOPHILS RELATIVE PERCENT: 0.3 % (ref 0–2)
BILIRUBIN URINE: NEGATIVE
BLOOD, URINE: NEGATIVE
BUN BLDV-MCNC: 6 MG/DL (ref 6–23)
BURR CELLS: ABNORMAL
CALCIUM SERPL-MCNC: 8.1 MG/DL (ref 8.6–10.2)
CHLORIDE BLD-SCNC: 96 MMOL/L (ref 98–107)
CLARITY: CLEAR
CO2: 29 MMOL/L (ref 22–29)
COLOR: YELLOW
CREAT SERPL-MCNC: 0.7 MG/DL (ref 0.7–1.2)
EOSINOPHILS ABSOLUTE: 0.19 E9/L (ref 0.05–0.5)
EOSINOPHILS RELATIVE PERCENT: 1.4 % (ref 0–6)
GFR SERPL CREATININE-BSD FRML MDRD: >60 ML/MIN/1.73
GLUCOSE BLD-MCNC: 115 MG/DL (ref 74–99)
GLUCOSE URINE: NEGATIVE MG/DL
HCT VFR BLD CALC: 39.1 % (ref 37–54)
HEMOGLOBIN: 13.2 G/DL (ref 12.5–16.5)
IMMATURE GRANULOCYTES #: 0.23 E9/L
IMMATURE GRANULOCYTES %: 1.7 % (ref 0–5)
KETONES, URINE: NEGATIVE MG/DL
LEUKOCYTE ESTERASE, URINE: NEGATIVE
LYMPHOCYTES ABSOLUTE: 0.52 E9/L (ref 1.5–4)
LYMPHOCYTES RELATIVE PERCENT: 3.9 % (ref 20–42)
MCH RBC QN AUTO: 29.1 PG (ref 26–35)
MCHC RBC AUTO-ENTMCNC: 33.8 % (ref 32–34.5)
MCV RBC AUTO: 86.1 FL (ref 80–99.9)
MONOCYTES ABSOLUTE: 0.73 E9/L (ref 0.1–0.95)
MONOCYTES RELATIVE PERCENT: 5.5 % (ref 2–12)
NEUTROPHILS ABSOLUTE: 11.5 E9/L (ref 1.8–7.3)
NEUTROPHILS RELATIVE PERCENT: 87.2 % (ref 43–80)
NITRITE, URINE: NEGATIVE
OVALOCYTES: ABNORMAL
PDW BLD-RTO: 13.9 FL (ref 11.5–15)
PH UA: 6.5 (ref 5–9)
PLATELET # BLD: 309 E9/L (ref 130–450)
PMV BLD AUTO: 9.6 FL (ref 7–12)
POIKILOCYTES: ABNORMAL
POLYCHROMASIA: ABNORMAL
POTASSIUM REFLEX MAGNESIUM: 4 MMOL/L (ref 3.5–5)
PROTEIN UA: NEGATIVE MG/DL
RBC # BLD: 4.54 E12/L (ref 3.8–5.8)
RBC UA: NORMAL /HPF (ref 0–2)
SODIUM BLD-SCNC: 134 MMOL/L (ref 132–146)
SPECIFIC GRAVITY UA: 1.01 (ref 1–1.03)
UROBILINOGEN, URINE: 0.2 E.U./DL
WBC # BLD: 13.2 E9/L (ref 4.5–11.5)
WBC UA: NORMAL /HPF (ref 0–5)

## 2022-11-01 PROCEDURE — 80048 BASIC METABOLIC PNL TOTAL CA: CPT

## 2022-11-01 PROCEDURE — 99283 EMERGENCY DEPT VISIT LOW MDM: CPT

## 2022-11-01 PROCEDURE — 85025 COMPLETE CBC W/AUTO DIFF WBC: CPT

## 2022-11-01 PROCEDURE — 36415 COLL VENOUS BLD VENIPUNCTURE: CPT

## 2022-11-01 PROCEDURE — 81001 URINALYSIS AUTO W/SCOPE: CPT

## 2022-11-01 SDOH — ECONOMIC STABILITY: FOOD INSECURITY: WITHIN THE PAST 12 MONTHS, THE FOOD YOU BOUGHT JUST DIDN'T LAST AND YOU DIDN'T HAVE MONEY TO GET MORE.: NEVER TRUE

## 2022-11-01 ASSESSMENT — LIFESTYLE VARIABLES
HOW OFTEN DO YOU HAVE A DRINK CONTAINING ALCOHOL: NEVER
HOW MANY STANDARD DRINKS CONTAINING ALCOHOL DO YOU HAVE ON A TYPICAL DAY: PATIENT DOES NOT DRINK

## 2022-11-01 ASSESSMENT — PAIN - FUNCTIONAL ASSESSMENT: PAIN_FUNCTIONAL_ASSESSMENT: NONE - DENIES PAIN

## 2022-11-01 NOTE — ED PROVIDER NOTES
[facility]  Department of Emergency Medicine   ED  Encounter Note  Admit Date/RoomTime: 2022  2:21 PM  ED Room:   NAME: Bhanu Dozier  : 1960  MRN: [id number]     Chief Complaint:  Joint Swelling    HISTORY OF PRESENT ILLNESS        Bhanu Dozier is a 58 y.o. male who presents to the ED with a complaint of bilateral ankle swelling. Patient was admitted 10-25 with diverticulitis with microperforation and A. fib RVR. He had a 4-day hospital stay. Discharged with Cipro, Flagyl for diverticulitis and metoprolol for heart rate. Patient now presents with a complaint of bilateral ankle swelling. He states ever since the day he was discharged he has had a lot of swelling around his ankles and feet. Does note it is worse in the evening and then better in the morning. And does admit it is really not that bad today. Patient does admit he received a lot of IV fluids during his admission. Does admit he is urinating a lot. He denies any fevers or chills. Denies chest pain. Denies palpitations or racing heart. Denies shortness of breath. No trouble laying in bed. Denies abdominal pain. Does admit he still having soft stools. Denies calf pain or calf swelling. Symptoms are mild in severity. ROS   Pertinent positives and negatives are stated within HPI, all other systems reviewed and are negative. Past Medical History:  has a past medical history of Anticoagulant long-term use, Anxiety, Atrial fibrillation ([address] GERD (gastroesophageal reflux disease), Hyperlipidemia, Insomnia, and Paroxysmal atrial fibrillation ([address] Surgical History:  has a past surgical history that includes Cataract removal (); Tonsillectomy; Upper gastrointestinal endoscopy (); Colonoscopy (2010); Endoscopy, colon, diagnostic; eye surgery (); Appendectomy; Vasectomy; Colonoscopy (2017);  Colonoscopy w/ biopsies (2022); and Colonoscopy (N/A, 8/5/2022). Social History:  reports that he has never smoked. He has never used smokeless tobacco. He reports that he does not currently use alcohol after a past usage of about 1.0 - 2.0 standard drink per week. He reports that he does not use drugs. Family History: family history includes Cancer in his paternal grandmother and sister; Colon Cancer in his father; Diabetes in his mother; High Blood Pressure in his father; High Cholesterol in his father; Hypertension in his father; Obesity in his father; Stroke in his paternal grandfather. Allergies: Patient has no known allergies. PHYSICAL EXAM   Oxygen Saturation Interpretation: Normal on room air analysis. ED Triage Vitals   BP Temp Temp Source Heart Rate Resp SpO2 Height Weight   11/01/22 1402 11/01/22 1402 11/01/22 1402 11/01/22 1402 11/01/22 1402 11/01/22 1402 11/01/22 1417 11/01/22 1417   130/60 97.7 °F (36.5 °C) Oral 83 16 100 % 6' (1.829 m) 186 lb (84.4 kg)         General:  NAD. Alert and Oriented. Well-appearing. Skin:  Warm, dry. No rashes. Head:  Normocephalic. Atraumatic. Eyes:  EOMI. Conjunctiva normal.  ENT:  Oral mucosa moist.  Airway patent. Neck:  Supple. Normal ROM. Respiratory:  No respiratory distress. No labored breathing. Lungs clear without rales, rhonchi or wheezing. Cardiovascular:  Regular rate. Very mild pretibial peripheral edema. Extremities warm and good color. Chest:  Abdomen:  Soft, nondistended. Normal bowel sounds. Nontender to palpation all 4 quadrants. Negative rebound, negative guarding. Rectal:  Gu: Bladder nontender and non distended. No CVA tenderness. Pelvic:  Extremities:  Normal ROM. Nontender to palpation. Atraumatic. Bilateral calves are not swollen. Calf palpation and calf squeeze is nontender. He may have some trace ankle edema, but I can still see the cords of his tendons and bones to the dorsum of both feet. Back:  Normal ROM. Nontender to palpation.   Neuro: Alert and Oriented to person, place, time and situation. Normal LOC. Moves all extremities. Speech fluent. Psych:  Calm and Cooperative. Normal thought process. Normal judgement. Lab / Imaging Results   (All laboratory and radiology results have been personally reviewed by myself)  Labs:  Results for orders placed or performed during the hospital encounter of 11/01/22   CBC with Auto Differential   Result Value Ref Range    WBC 13.2 (H) 4.5 - 11.5 E9/L    RBC 4.54 3.80 - 5.80 E12/L    Hemoglobin 13.2 12.5 - 16.5 g/dL    Hematocrit 39.1 37.0 - 54.0 %    MCV 86.1 80.0 - 99.9 fL    MCH 29.1 26.0 - 35.0 pg    MCHC 33.8 32.0 - 34.5 %    RDW 13.9 11.5 - 15.0 fL    Platelets 381 658 - 982 E9/L    MPV 9.6 7.0 - 12.0 fL   Basic Metabolic Panel w/ Reflex to MG   Result Value Ref Range    Sodium 134 132 - 146 mmol/L    Potassium reflex Magnesium 4.0 3.5 - 5.0 mmol/L    Chloride 96 (L) 98 - 107 mmol/L    CO2 29 22 - 29 mmol/L    Anion Gap 9 7 - 16 mmol/L    Glucose 115 (H) 74 - 99 mg/dL    BUN 6 6 - 23 mg/dL    Creatinine 0.7 0.7 - 1.2 mg/dL    Est, Glom Filt Rate >60 >=60 mL/min/1.73    Calcium 8.1 (L) 8.6 - 10.2 mg/dL   Urinalysis with Microscopic   Result Value Ref Range    Color, UA Yellow Straw/Yellow    Clarity, UA Clear Clear    Glucose, Ur Negative Negative mg/dL    Bilirubin Urine Negative Negative    Ketones, Urine Negative Negative mg/dL    Specific Gravity, UA 1.010 1.005 - 1.030    Blood, Urine Negative Negative    pH, UA 6.5 5.0 - 9.0    Protein, UA Negative Negative mg/dL    Urobilinogen, Urine 0.2 <2.0 E.U./dL    Nitrite, Urine Negative Negative    Leukocyte Esterase, Urine Negative Negative    WBC, UA 0-1 0 - 5 /HPF    RBC, UA NONE 0 - 2 /HPF    Bacteria, UA NONE SEEN None Seen /HPF     Imaging: All Radiology results interpreted by Radiologist unless otherwise noted.   No orders to display       ED Course / Medical Decision Making   Medications - No data to display     Re-examination:  11/1/22 Time:   Patients condition . Consult(s):   None    Procedure(s):   None    MDM:   Work-up in the ER is essentially negative. Electrolytes within normal limit. Not anemic. No UTI. Heart rate in the 80s and regular. Patient will finish his antibiotic. No medication changes. Discussed with him giving it a few more days to get that IV fluid out of him and see if that helps the swelling. Call his family doctor for follow-up. Plan of Care/Counseling:  Verónica Stokes reviewed today's visit with the patient and spouse / life partner in addition to providing specific details for the plan of care and counseling regarding the diagnosis and prognosis. Questions are answered at this time and are agreeable with the plan. ASSESSMENT     1. Dependent edema      PLAN   Discharged home. Patient condition is good    New Medications     New Prescriptions    No medications on file     Electronically signed by LEX Stokes   DD: 11/1/22  **This report was transcribed using voice recognition software. Every effort was made to ensure accuracy; however, inadvertent computerized transcription errors may be present.   END OF ED PROVIDER NOTE       Verónica Stokes  11/01/22 6038

## 2022-11-07 DIAGNOSIS — R94.31 ABNORMAL EKG: ICD-10-CM

## 2022-11-07 DIAGNOSIS — I48.0 PAROXYSMAL ATRIAL FIBRILLATION (HCC): ICD-10-CM

## 2022-11-10 ENCOUNTER — OFFICE VISIT (OUTPATIENT)
Dept: FAMILY MEDICINE CLINIC | Age: 62
End: 2022-11-10
Payer: COMMERCIAL

## 2022-11-10 VITALS
WEIGHT: 181 LBS | HEART RATE: 60 BPM | TEMPERATURE: 97.3 F | DIASTOLIC BLOOD PRESSURE: 72 MMHG | OXYGEN SATURATION: 99 % | SYSTOLIC BLOOD PRESSURE: 102 MMHG | BODY MASS INDEX: 24.52 KG/M2 | HEIGHT: 72 IN | RESPIRATION RATE: 16 BRPM

## 2022-11-10 DIAGNOSIS — I48.0 PAROXYSMAL ATRIAL FIBRILLATION (HCC): ICD-10-CM

## 2022-11-10 DIAGNOSIS — E78.00 PURE HYPERCHOLESTEROLEMIA: ICD-10-CM

## 2022-11-10 DIAGNOSIS — R30.0 DYSURIA: ICD-10-CM

## 2022-11-10 DIAGNOSIS — R68.82 LOW LIBIDO: Primary | ICD-10-CM

## 2022-11-10 DIAGNOSIS — M79.89 LEG SWELLING: ICD-10-CM

## 2022-11-10 DIAGNOSIS — M54.12 CERVICAL RADICULOPATHY: ICD-10-CM

## 2022-11-10 DIAGNOSIS — D72.829 LEUKOCYTOSIS, UNSPECIFIED TYPE: ICD-10-CM

## 2022-11-10 PROCEDURE — 99214 OFFICE O/P EST MOD 30 MIN: CPT | Performed by: FAMILY MEDICINE

## 2022-11-10 PROCEDURE — 81002 URINALYSIS NONAUTO W/O SCOPE: CPT | Performed by: FAMILY MEDICINE

## 2022-11-10 RX ORDER — FUROSEMIDE 20 MG/1
20 TABLET ORAL DAILY
Qty: 3 TABLET | Refills: 0 | Status: SHIPPED
Start: 2022-11-10 | End: 2022-11-17

## 2022-11-10 RX ORDER — MELOXICAM 15 MG/1
15 TABLET ORAL DAILY
Qty: 30 TABLET | Refills: 0 | Status: SHIPPED
Start: 2022-11-10 | End: 2022-11-17

## 2022-11-10 RX ORDER — METOPROLOL SUCCINATE 200 MG/1
TABLET, EXTENDED RELEASE ORAL
COMMUNITY
Start: 2022-10-28

## 2022-11-10 NOTE — PROGRESS NOTES
FM Progress Note    Subjective:   BMs returning to normal. No blood. No fever. Finished Abx on Monday. HR low normal. BP low normal. Feels fuzzy. Ankle swelling since hospital.     Night sweats have stopped as of 2 nights ago. Dysuria. Some urinary retention at times. Leukocytosis on 11/1/22. UA neg. Some cough, but improving. No SOB. Cervical radiculopathy resolved. Health Maintenance Due   Topic Date Due    Shingles vaccine (1 of 2) Never done    Lipids  12/18/2021    COVID-19 Vaccine (4 - Booster for Moderna series) 12/30/2021    Flu vaccine (1) 08/01/2022         Objective:   /72   Pulse 60   Temp 97.3 °F (36.3 °C)   Resp 16   Ht 6' (1.829 m)   Wt 181 lb (82.1 kg)   SpO2 99%   BMI 24.55 kg/m²   General appearance: NAD, alert and interacting appropriately  HEENT: NCAT, PERRLA, EOMI   Resp: CTAB, no WRC  CVS: irreg irreg  Abdomen: BS +, SNDNT  Extremities: No clubbing, cyanosis. Trace ble edema. Warm. Dry. I have reviewed this patient's previous records. I have reviewed this patient's labs. I have reviewed this patient's medications. Assessment/Plan:    Carlos Alberto Ramsey was seen today for follow-up. Diagnoses and all orders for this visit:    Low libido  -     TESTOSTERONE; Future    Leg swelling  -     furosemide (LASIX) 20 MG tablet; Take 1 tablet by mouth daily for 3 days  -     Comprehensive Metabolic Panel; Future    Leukocytosis, unspecified type  -     POCT Urinalysis no Micro  -     Culture, Urine; Future  -     CBC with Auto Differential; Future    Dysuria  -     POCT Urinalysis no Micro  -     Culture, Urine; Future    Cervical radiculopathy  -     meloxicam (MOBIC) 15 MG tablet; Take 1 tablet by mouth daily    Paroxysmal atrial fibrillation (HCC)    Pure hypercholesterolemia  -     LIPID PANEL; Future          Patient Instructions   Schedule an appointment with Dr. Perla Ma.      Follow up with the electrophysiologist. If needed, ask them if you can decrease the metoprolol dose so you don't feel so fuzzy and your legs don't swell as much. In one week, get your blood work done. Return in about 1 month (around 12/10/2022).           Electronically signed by Suzette Koch MD on 11/10/2022 at 9:03 AM

## 2022-11-10 NOTE — PATIENT INSTRUCTIONS
Schedule an appointment with Dr. Bernie Diaz. Follow up with the electrophysiologist. If needed, ask them if you can decrease the metoprolol dose so you don't feel so fuzzy and your legs don't swell as much. In one week, get your blood work done.

## 2022-11-13 LAB — URINE CULTURE, ROUTINE: NORMAL

## 2022-11-17 ENCOUNTER — OFFICE VISIT (OUTPATIENT)
Dept: NON INVASIVE DIAGNOSTICS | Age: 62
End: 2022-11-17
Payer: COMMERCIAL

## 2022-11-17 ENCOUNTER — HOSPITAL ENCOUNTER (OUTPATIENT)
Age: 62
Discharge: HOME | End: 2022-11-17
Payer: COMMERCIAL

## 2022-11-17 VITALS
SYSTOLIC BLOOD PRESSURE: 118 MMHG | BODY MASS INDEX: 23.49 KG/M2 | WEIGHT: 173.4 LBS | RESPIRATION RATE: 16 BRPM | DIASTOLIC BLOOD PRESSURE: 72 MMHG | HEART RATE: 102 BPM | OXYGEN SATURATION: 98 % | HEIGHT: 72 IN

## 2022-11-17 DIAGNOSIS — M79.89 LEG SWELLING: ICD-10-CM

## 2022-11-17 DIAGNOSIS — E78.00 PURE HYPERCHOLESTEROLEMIA: ICD-10-CM

## 2022-11-17 DIAGNOSIS — D72.829 LEUKOCYTOSIS, UNSPECIFIED TYPE: ICD-10-CM

## 2022-11-17 DIAGNOSIS — I48.0 PAROXYSMAL ATRIAL FIBRILLATION (HCC): Primary | ICD-10-CM

## 2022-11-17 DIAGNOSIS — R68.82 LOW LIBIDO: ICD-10-CM

## 2022-11-17 LAB
ALBUMIN SERPL-MCNC: 4.1 G/DL (ref 3.5–5.2)
ALP BLD-CCNC: 51 U/L (ref 40–129)
ALT SERPL-CCNC: 11 U/L (ref 0–40)
ANION GAP SERPL CALCULATED.3IONS-SCNC: 10 MMOL/L (ref 7–16)
AST SERPL-CCNC: 12 U/L (ref 0–39)
BASOPHILS ABSOLUTE: 0.02 E9/L (ref 0–0.2)
BASOPHILS RELATIVE PERCENT: 0.3 % (ref 0–2)
BILIRUB SERPL-MCNC: 0.6 MG/DL (ref 0–1.2)
BUN BLDV-MCNC: 12 MG/DL (ref 6–23)
CALCIUM SERPL-MCNC: 9.3 MG/DL (ref 8.6–10.2)
CHLORIDE BLD-SCNC: 100 MMOL/L (ref 98–107)
CHOLESTEROL, TOTAL: 180 MG/DL (ref 0–199)
CO2: 28 MMOL/L (ref 22–29)
CREAT SERPL-MCNC: 0.9 MG/DL (ref 0.7–1.2)
EOSINOPHILS ABSOLUTE: 0.17 E9/L (ref 0.05–0.5)
EOSINOPHILS RELATIVE PERCENT: 2.9 % (ref 0–6)
GFR SERPL CREATININE-BSD FRML MDRD: >60 ML/MIN/1.73
GLUCOSE BLD-MCNC: 131 MG/DL (ref 74–99)
HCT VFR BLD CALC: 43.8 % (ref 37–54)
HDLC SERPL-MCNC: 48 MG/DL
HEMOGLOBIN: 14.6 G/DL (ref 12.5–16.5)
IMMATURE GRANULOCYTES #: 0.03 E9/L
IMMATURE GRANULOCYTES %: 0.5 % (ref 0–5)
LDL CHOLESTEROL CALCULATED: 116 MG/DL (ref 0–99)
LYMPHOCYTES ABSOLUTE: 0.41 E9/L (ref 1.5–4)
LYMPHOCYTES RELATIVE PERCENT: 7 % (ref 20–42)
MCH RBC QN AUTO: 28.7 PG (ref 26–35)
MCHC RBC AUTO-ENTMCNC: 33.3 % (ref 32–34.5)
MCV RBC AUTO: 86.2 FL (ref 80–99.9)
MONOCYTES ABSOLUTE: 0.53 E9/L (ref 0.1–0.95)
MONOCYTES RELATIVE PERCENT: 9 % (ref 2–12)
NEUTROPHILS ABSOLUTE: 4.73 E9/L (ref 1.8–7.3)
NEUTROPHILS RELATIVE PERCENT: 80.3 % (ref 43–80)
PDW BLD-RTO: 14.2 FL (ref 11.5–15)
PLATELET # BLD: 201 E9/L (ref 130–450)
PMV BLD AUTO: 9.4 FL (ref 7–12)
POTASSIUM SERPL-SCNC: 4.9 MMOL/L (ref 3.5–5)
RBC # BLD: 5.08 E12/L (ref 3.8–5.8)
SODIUM BLD-SCNC: 138 MMOL/L (ref 132–146)
TESTOSTERONE TOTAL: 349.9 NG/DL
TOTAL PROTEIN: 6.7 G/DL (ref 6.4–8.3)
TRIGL SERPL-MCNC: 78 MG/DL (ref 0–149)
VLDLC SERPL CALC-MCNC: 16 MG/DL
WBC # BLD: 5.9 E9/L (ref 4.5–11.5)

## 2022-11-17 PROCEDURE — 84403 ASSAY OF TOTAL TESTOSTERONE: CPT

## 2022-11-17 PROCEDURE — 99215 OFFICE O/P EST HI 40 MIN: CPT | Performed by: INTERNAL MEDICINE

## 2022-11-17 PROCEDURE — 80053 COMPREHEN METABOLIC PANEL: CPT

## 2022-11-17 PROCEDURE — 36415 COLL VENOUS BLD VENIPUNCTURE: CPT

## 2022-11-17 PROCEDURE — 80061 LIPID PANEL: CPT

## 2022-11-17 PROCEDURE — 93000 ELECTROCARDIOGRAM COMPLETE: CPT | Performed by: INTERNAL MEDICINE

## 2022-11-17 PROCEDURE — 85025 COMPLETE CBC W/AUTO DIFF WBC: CPT

## 2022-11-17 RX ORDER — METOPROLOL SUCCINATE 200 MG/1
200 TABLET, EXTENDED RELEASE ORAL DAILY
Qty: 30 TABLET | Refills: 3 | Status: SHIPPED | OUTPATIENT
Start: 2022-11-17

## 2022-11-17 NOTE — PROGRESS NOTES
[address] and [address] Electrophysiology  Consultation Report  PATIENT: Teresa Mcmullen RECORD NUMBER: [id number]  DATE OF SERVICE:  11/17/2022  ATTENDING ELECTROPHYSIOLOGIST: Maurice Miranda MD  REFERRING PHYSICIAN: No ref. provider found and Petra Homans, MD  CHIEF COMPLAINT:   Chief Complaint   Patient presents with    Atrial Fibrillation     6 week F/U Zio and Echo          HPI: This is a 58 y.o. male with paroxysmal atrial fibrillation which has been persistent for the last several years was first seen by me on 10/6/2022 . We obtained initial cardiac evaluation but the patient was hospitalized with abdominal pain and diarrhea to acute diverticulitis. He was treated with IV Rocephin and Flagyl and his dose of beta-blockade increased. He was placed on metoprolol  mg twice a day for better rate control. The patient returns to the office today for follow-up. He feels well and denies symptoms of chest pain. No complaints of paroxysmal nocturnal dyspnea, orthopnea or leg edema  No syncope or near syncope either. We had discussed restoration of sinus rhythm during his last visit and the patient wishes to discuss the same.          Patient Active Problem List    Diagnosis Date Noted    Acute diverticulitis 10/24/2022     Priority: Medium    Atrial fibrillation with rapid ventricular response ([address] 10/24/2022     Priority: Medium    Family history of colon cancer 08/05/2022     Priority: Medium    Chronic left shoulder pain 10/05/2018    Anxiety 02/16/2018    Sleep disturbance 02/16/2018    History of colon polyps     Adenomatous polyp of colon     Diverticulosis 10/13/2014    Tubular adenoma 10/13/2014    FH: colon cancer 09/12/2013    GERD (gastroesophageal reflux disease) 09/12/2013    Hypogonadism male 09/12/2013    Hyperlipidemia 09/12/2013    Paroxysmal atrial fibrillation ([address] 09/12/2013       Past Medical History:   Diagnosis Date    Anticoagulant long-term use     Anxiety     Atrial fibrillation (HCC)     GERD (gastroesophageal reflux disease)     Hyperlipidemia     Insomnia     Paroxysmal atrial fibrillation (HCC)        Family History   Problem Relation Age of Onset    Diabetes Mother         pre- daibetic    Hypertension Father     High Blood Pressure Father     High Cholesterol Father     Obesity Father     Colon Cancer Father     Cancer Sister     Cancer Paternal Grandmother         68    Stroke Paternal Grandfather    No family history of premature CAD    Social History     Tobacco Use    Smoking status: Never    Smokeless tobacco: Never   Substance Use Topics    Alcohol use: Not Currently     Alcohol/week: 1.0 - 2.0 standard drink     Types: 1 - 2 Cans of beer per week       Current Outpatient Medications   Medication Sig Dispense Refill    metoprolol succinate (TOPROL XL) 200 MG extended release tablet       omeprazole (PRILOSEC) 40 MG delayed release capsule Take 1 capsule by mouth Daily 90 capsule 1    rosuvastatin (CRESTOR) 40 MG tablet Take 1 tablet by mouth daily 90 tablet 1    traZODone (DESYREL) 50 MG tablet Take 1 tablet by mouth nightly 90 tablet 1    aspirin 81 MG EC tablet Take 81 mg by mouth daily      melatonin 3 MG TABS tablet Take 2 tablets by mouth daily 60 tablet 3     No current facility-administered medications for this visit. No Known Allergies    ROS:   Constitutional: Negative for fever, activity change and appetite change. HENT: Negative for epistaxis. Eyes: Negative for diploplia, blurred vision. Respiratory: Negative for cough, chest tightness, shortness of breath and wheezing. Cardiovascular: pertinent positives in HPI  Gastrointestinal: Negative for abdominal pain and blood in stool.    All other review of systems are negative     PHYSICAL EXAM:   Vitals:    11/17/22 1132   BP: 118/72   Pulse: (!) 102   Resp: 16   SpO2: 98%   Weight: 173 lb 6.4 oz (78.7 kg)   Height: 6' (1.829 m)      Constitutional: Well-developed, no acute distress  Eyes: conjunctivae normal, no xanthelasma   Ears, Nose, Throat: oral mucosa moist, no cyanosis   CV: PMI is laterally displaced, irregular rate and rhythm. Normal S1S2 and no S3. No murmurs, rubs, or gallops. Lungs: clear to auscultation bilaterally, normal respiratory effort without used of accessory muscles  Abdomen: soft, non-tender, bowel sounds present, no masses or hepatomegaly   Musculoskeletal: no digital clubbing, no edema   Skin: warm, no rashes     I have personally reviewed the laboratory, cardiac diagnostic and radiographic testing as outlined below:    Data:    Recent Labs     11/17/22  1007   WBC 5.9   HGB 14.6   HCT 43.8        Recent Labs     11/17/22  1007      K 4.9      CO2 28   BUN 12   CREATININE 0.9   CALCIUM 9.3      Lab Results   Component Value Date/Time    MG 2.1 10/28/2022 05:46 AM     No results for input(s): TSH in the last 72 hours. No results for input(s): INR in the last 72 hours. EKG: Atrial Fibrillation ,PVCs Please see scan in Cardiology. Echocardiogram:10/22    Conclusions      Summary   No significant valvular abnormalities. Normal left ventricle size. Ejection fraction is visually estimated at 55-60%. Mild bi-atrial enlargement   Atrial fibrillation      Signature      ----------------------------------------------------------------   Electronically signed by Yas Wing MD(Interpreting   physician) on 10/11/2022 11:43 AM   ----------------------------------------------------------------    Cardiac Catheterization: None    Stress Test: 11/21  Impression:    Exercise EKG was negative. The patient experienced no chest pain with exercise. The myocardial perfusion imaging was normal.    Overall left ventricular systolic function was normal without regional wall motion abnormalities. Shetty treadmill score was 6 implying low risk. Exercise capacity was average.     Low risk general exercise treadmill test. Thank you for sending your patient to this [facility] [facility]. Electronically signed by Joaquin Gómez MD on 11/15/21 at 5:23 PM EST    Event Monitor:10/6/--10/19/22    Agree with Findings. Atrial Fibrillation occurred continuously (100%  burden), ranging from  bpm (avg of 100 bpm). Isolated VEs were  frequent (10.5%, [id number]), VE Couplets were rare (<1.0%, 7136), and  VE Triplets were rare (<1.0%, 832). Ventricular Bigeminy and  Trigeminy were present. I have independently reviewed all of the ECGs and rhythm strips per above     Assessment/Plan:    Permanent atrial fibrillation. Review of all his EKGs dating back to 2015 show atrial fibrillation. He has been on rate control with atenolol as well as aspirin  [id number] score of 0 in view of his young age and no other previous cardiac /cerebrovascular history     2. Hyperlipidemia    3. Acute diverticulitis and hospitalization for the same 3 weeks ago. Symptoms of abdominal pain and diarrhea have resolved    4. Echo and stress test results as noted above      Recommendations:    Patient is agreeable to proceeding with an attempt to cardiovert and restore sinus rhythm   Start systemic anticoagulation with Eliquis  Sleep study as soon as possible and treatment of the same if necessary  DCCV and hospitalize for AAD in January 2023. Patient wishes to wait till early next year to proceed with the same. All of the above was discussed with the patient  reviewing the above stated recommendations. And a total of >50% of that time involved face-to-face time providing counseling and or coordination of care with the other providers, preparation for the clinic visit, reviewing records/tests, counseling/education of the patient, ordering medications/tests/procedures, coordinating care, and documenting clinical information in the EHR. Thank you for allowing me to participate in your patient's care.   Please call me if there are any questions or concerns.       Panchito Lange MD, [address] [facility]  Cardiac Electrophysiology  [facility] ([facility]) Physicians  [facility] [place]: [facility]

## 2022-12-20 ENCOUNTER — OFFICE VISIT (OUTPATIENT)
Dept: FAMILY MEDICINE CLINIC | Age: 62
End: 2022-12-20
Payer: COMMERCIAL

## 2022-12-20 VITALS
HEART RATE: 80 BPM | DIASTOLIC BLOOD PRESSURE: 80 MMHG | TEMPERATURE: 98.6 F | BODY MASS INDEX: 24.79 KG/M2 | WEIGHT: 183 LBS | OXYGEN SATURATION: 100 % | SYSTOLIC BLOOD PRESSURE: 119 MMHG | RESPIRATION RATE: 19 BRPM | HEIGHT: 72 IN

## 2022-12-20 DIAGNOSIS — I48.91 ATRIAL FIBRILLATION, UNSPECIFIED TYPE (HCC): Primary | ICD-10-CM

## 2022-12-20 PROCEDURE — 99214 OFFICE O/P EST MOD 30 MIN: CPT | Performed by: FAMILY MEDICINE

## 2022-12-20 NOTE — PROGRESS NOTES
FM Progress Note    Subjective:   HR normal. BP normal.     Cervical radiculopathy resolved. Afib. Permanent. Prefers not to do cardioversion. On asa, not eliquis. Sleep study declined. Foot swelling improved. No longer working the stressful job. There are no preventive care reminders to display for this patient. Objective:   /80   Pulse 80   Temp 98.6 °F (37 °C)   Resp 19   Ht 6' (1.829 m)   Wt 183 lb (83 kg)   SpO2 100%   BMI 24.82 kg/m²   General appearance: NAD, alert and interacting appropriately  HEENT: NCAT, PERRLA, EOMI   Resp: CTAB, no WRC  CVS: irreg irreg  Abdomen: BS +, SNDNT  Extremities: No clubbing, cyanosis. Trace ble edema. Warm. Dry. I have reviewed this patient's previous records. I have reviewed this patient's labs. I have reviewed this patient's medications. Assessment/Plan:    Iris Saenz was seen today for hyperlipidemia and medication problem. Diagnoses and all orders for this visit:    Atrial fibrillation, unspecified type ([address]        Return in about 6 months (around 6/20/2023). Extensive counseling on r/b/a of cardioversion and eliquis. [id number] reviewed today    Greater than 50% of this 30 minute face-to-face patient encounter was spent counseling/care coordinating on The encounter diagnosis was Atrial fibrillation, unspecified type ([address] .       Electronically signed by Heather Akers MD on 12/20/2022 at 5:04 PM

## 2023-01-31 ENCOUNTER — TELEPHONE (OUTPATIENT)
Dept: NON INVASIVE DIAGNOSTICS | Age: 63
End: 2023-01-31

## 2023-01-31 DIAGNOSIS — G47.30 SLEEP DISORDER BREATHING: Primary | ICD-10-CM

## 2023-01-31 NOTE — TELEPHONE ENCOUNTER
Chucho 1/13/2023 and spoke to Cl[facility] on 1/31/2023. Cl[facility] does not want to go thru with the cardioversion/ AAD admit at this time.

## 2023-03-21 ENCOUNTER — TELEPHONE (OUTPATIENT)
Dept: FAMILY MEDICINE CLINIC | Age: 63
End: 2023-03-21

## 2023-03-21 NOTE — TELEPHONE ENCOUNTER
omeprazole (PRILOSEC) 40 MG delayed release capsule  rosuvastatin (CRESTOR) 40 MG tablet   atenolol (TENORMIN) tablet 100 mg  metoprolol succinate (TOPROL XL) 200 MG extended release tablet  Pt needs refills please send to [facility] in [place]

## 2023-03-22 DIAGNOSIS — G47.00 INSOMNIA, UNSPECIFIED TYPE: ICD-10-CM

## 2023-03-22 DIAGNOSIS — G47.9 SLEEP DISTURBANCE: ICD-10-CM

## 2023-03-22 DIAGNOSIS — K21.9 GASTROESOPHAGEAL REFLUX DISEASE, UNSPECIFIED WHETHER ESOPHAGITIS PRESENT: ICD-10-CM

## 2023-03-22 RX ORDER — OMEPRAZOLE 40 MG/1
40 CAPSULE, DELAYED RELEASE ORAL DAILY
Qty: 90 CAPSULE | Refills: 1 | Status: SHIPPED | OUTPATIENT
Start: 2023-03-22

## 2023-03-22 RX ORDER — TRAZODONE HYDROCHLORIDE 50 MG/1
50 TABLET ORAL NIGHTLY
Qty: 90 TABLET | Refills: 1 | Status: SHIPPED | OUTPATIENT
Start: 2023-03-22

## 2023-03-22 NOTE — TELEPHONE ENCOUNTER
omeprazole (PRILOSEC) 40 MG delayed release capsule  rosuvastatin (CRESTOR) 40 MG tablet   metoprolol succinate (TOPROL XL) 200 MG extended release tablet  Trazodone

## 2023-03-24 ENCOUNTER — TELEPHONE (OUTPATIENT)
Dept: FAMILY MEDICINE CLINIC | Age: 63
End: 2023-03-24

## 2023-03-24 DIAGNOSIS — E78.00 PURE HYPERCHOLESTEROLEMIA: ICD-10-CM

## 2023-03-24 RX ORDER — ROSUVASTATIN CALCIUM 40 MG/1
40 TABLET, COATED ORAL DAILY
Qty: 90 TABLET | Refills: 1 | Status: SHIPPED | OUTPATIENT
Start: 2023-03-24

## 2023-03-27 ENCOUNTER — TELEPHONE (OUTPATIENT)
Dept: FAMILY MEDICINE CLINIC | Age: 63
End: 2023-03-27

## 2023-03-27 RX ORDER — METOPROLOL SUCCINATE 200 MG/1
TABLET, EXTENDED RELEASE ORAL
Qty: 30 TABLET | Refills: 0 | Status: SHIPPED
Start: 2023-03-27 | End: 2023-04-27 | Stop reason: SDUPTHER

## 2023-03-27 NOTE — TELEPHONE ENCOUNTER
Pt called for refill alst week, but only 3 of 4 meds went through. Still needs toprol refilled. Totally out now.     [id number] - [place] [phone] - F [phone]         metoprolol succinate (TOPROL XL) 200 MG extended release tablet

## 2023-04-27 ENCOUNTER — TELEPHONE (OUTPATIENT)
Dept: FAMILY MEDICINE CLINIC | Age: 63
End: 2023-04-27

## 2023-04-27 RX ORDER — METOPROLOL SUCCINATE 200 MG/1
200 TABLET, EXTENDED RELEASE ORAL DAILY
Qty: 90 TABLET | Refills: 1 | Status: SHIPPED | OUTPATIENT
Start: 2023-04-27

## 2023-06-20 ENCOUNTER — OFFICE VISIT (OUTPATIENT)
Dept: FAMILY MEDICINE CLINIC | Age: 63
End: 2023-06-20
Payer: COMMERCIAL

## 2023-06-20 VITALS
TEMPERATURE: 97.1 F | DIASTOLIC BLOOD PRESSURE: 76 MMHG | HEART RATE: 63 BPM | BODY MASS INDEX: 26.01 KG/M2 | WEIGHT: 192 LBS | OXYGEN SATURATION: 96 % | HEIGHT: 72 IN | SYSTOLIC BLOOD PRESSURE: 118 MMHG | RESPIRATION RATE: 16 BRPM

## 2023-06-20 DIAGNOSIS — K21.9 GASTROESOPHAGEAL REFLUX DISEASE, UNSPECIFIED WHETHER ESOPHAGITIS PRESENT: ICD-10-CM

## 2023-06-20 DIAGNOSIS — G47.00 INSOMNIA, UNSPECIFIED TYPE: ICD-10-CM

## 2023-06-20 DIAGNOSIS — E78.00 PURE HYPERCHOLESTEROLEMIA: Primary | ICD-10-CM

## 2023-06-20 DIAGNOSIS — M54.12 CERVICAL RADICULOPATHY: ICD-10-CM

## 2023-06-20 DIAGNOSIS — I48.91 ATRIAL FIBRILLATION, UNSPECIFIED TYPE (HCC): ICD-10-CM

## 2023-06-20 PROCEDURE — 99214 OFFICE O/P EST MOD 30 MIN: CPT | Performed by: FAMILY MEDICINE

## 2023-06-20 SDOH — ECONOMIC STABILITY: FOOD INSECURITY: WITHIN THE PAST 12 MONTHS, THE FOOD YOU BOUGHT JUST DIDN'T LAST AND YOU DIDN'T HAVE MONEY TO GET MORE.: NEVER TRUE

## 2023-06-20 SDOH — HEALTH STABILITY: PHYSICAL HEALTH: ON AVERAGE, HOW MANY DAYS PER WEEK DO YOU ENGAGE IN MODERATE TO STRENUOUS EXERCISE (LIKE A BRISK WALK)?: 4 DAYS

## 2023-06-20 SDOH — ECONOMIC STABILITY: HOUSING INSECURITY: IN THE LAST 12 MONTHS, HOW MANY PLACES HAVE YOU LIVED?: 1

## 2023-06-20 SDOH — HEALTH STABILITY: PHYSICAL HEALTH: ON AVERAGE, HOW MANY MINUTES DO YOU ENGAGE IN EXERCISE AT THIS LEVEL?: 30 MIN

## 2023-06-20 SDOH — ECONOMIC STABILITY: INCOME INSECURITY: IN THE LAST 12 MONTHS, WAS THERE A TIME WHEN YOU WERE NOT ABLE TO PAY THE MORTGAGE OR RENT ON TIME?: NO

## 2023-06-20 SDOH — ECONOMIC STABILITY: FOOD INSECURITY: WITHIN THE PAST 12 MONTHS, YOU WORRIED THAT YOUR FOOD WOULD RUN OUT BEFORE YOU GOT MONEY TO BUY MORE.: NEVER TRUE

## 2023-06-20 ASSESSMENT — SOCIAL DETERMINANTS OF HEALTH (SDOH)
DO YOU BELONG TO ANY CLUBS OR ORGANIZATIONS SUCH AS CHURCH GROUPS UNIONS, FRATERNAL OR ATHLETIC GROUPS, OR SCHOOL GROUPS?: NO
HOW OFTEN DO YOU ATTEND CHURCH OR RELIGIOUS SERVICES?: NEVER
WITHIN THE LAST YEAR, HAVE YOU BEEN KICKED, HIT, SLAPPED, OR OTHERWISE PHYSICALLY HURT BY YOUR PARTNER OR EX-PARTNER?: NO
HOW OFTEN DO YOU ATTENT MEETINGS OF THE CLUB OR ORGANIZATION YOU BELONG TO?: NEVER
WITHIN THE LAST YEAR, HAVE YOU BEEN HUMILIATED OR EMOTIONALLY ABUSED IN OTHER WAYS BY YOUR PARTNER OR EX-PARTNER?: NO
WITHIN THE LAST YEAR, HAVE TO BEEN RAPED OR FORCED TO HAVE ANY KIND OF SEXUAL ACTIVITY BY YOUR PARTNER OR EX-PARTNER?: NO
HOW OFTEN DO YOU GET TOGETHER WITH FRIENDS OR RELATIVES?: MORE THAN THREE TIMES A WEEK
IN A TYPICAL WEEK, HOW MANY TIMES DO YOU TALK ON THE PHONE WITH FAMILY, FRIENDS, OR NEIGHBORS?: MORE THAN THREE TIMES A WEEK
HOW HARD IS IT FOR YOU TO PAY FOR THE VERY BASICS LIKE FOOD, HOUSING, MEDICAL CARE, AND HEATING?: NOT HARD AT ALL
WITHIN THE LAST YEAR, HAVE YOU BEEN AFRAID OF YOUR PARTNER OR EX-PARTNER?: NO

## 2023-06-20 NOTE — PROGRESS NOTES
FM Progress Note    Subjective:   GERD. Omeprazole 40 helps. No further bloating. Symptoms returned soon after stopping omeprazole. Afib. Permanent. Aspirin and metoprolol. Prefers not to do cardioversion. On asa, not eliquis. Elevated BP. Resolved off ibuprofen. Dyslipidemia. Crestor 40. The 10-year ASCVD risk score (Vivienne VIRK, et al., 2019) is: 9%    Values used to calculate the score:      Age: 61 years      Sex: Male      Is Non- : No      Diabetic: No      Tobacco smoker: No      Systolic Blood Pressure: 277 mmHg      Is BP treated: No      HDL Cholesterol: 48 mg/dL      Total Cholesterol: 180 mg/dL     Insomnia. Trazodone helps. Family history colon cancer. Dad  from it at 61. Saw Dr. Elías Gomez in the past, utd on screening    Hyperglycemia. Lab Results   Component Value Date    LABA1C 6.2 (H) 10/25/2022       Working less stressful job now. Walks dog. Health Maintenance Due   Topic Date Due    Shingles vaccine (2 of 2) 2023         Objective:   /76   Pulse 63   Temp 97.1 °F (36.2 °C)   Resp 16   Ht 6' (1.829 m)   Wt 192 lb (87.1 kg)   SpO2 96%   BMI 26.04 kg/m²   General appearance: NAD, alert and interacting appropriately  HEENT: NCAT, PERRLA, EOMI   Resp: CTAB, no WRC  CVS: irregularly irregular, no MRG  Abdomen: BS +, SNDNT  Extremities: No clubbing, cyanosis, or edema. Warm. Dry. I have reviewed this patient's previous records. I have reviewed this patient's labs. I have reviewed this patient's medications. Assessment/Plan:    Alex Alexandre was seen today for hyperlipidemia and shoulder pain.     Diagnoses and all orders for this visit:    Pure hypercholesterolemia    Atrial fibrillation, unspecified type (HCC)    Insomnia, unspecified type    Gastroesophageal reflux disease, unspecified whether esophagitis present    Cervical radiculopathy  -     [facility]    CPM Afib  CPM HLD  CPM insomnia  CPM

## 2023-07-25 ENCOUNTER — EVALUATION (OUTPATIENT)
Dept: PHYSICAL THERAPY | Age: 63
End: 2023-07-25
Payer: COMMERCIAL

## 2023-07-25 DIAGNOSIS — M54.12 CERVICAL RADICULOPATHY: Primary | ICD-10-CM

## 2023-07-25 PROCEDURE — 97140 MANUAL THERAPY 1/> REGIONS: CPT | Performed by: PHYSICAL THERAPIST

## 2023-07-25 PROCEDURE — 97161 PT EVAL LOW COMPLEX 20 MIN: CPT | Performed by: PHYSICAL THERAPIST

## 2023-07-25 NOTE — PROGRESS NOTES
[facility]          Phone: [phone] Fax: [phone]    Physical Therapy Daily Treatment Note  Date:  2023    Patient Name:  Pardeep Georges    :  1960  MRN: [id number]    Evaluating Therapist:  Pama [place], [place] [id number]    Restrictions/Precautions:    Diagnosis:     Diagnosis Orders   1.  Cervical radiculopathy          Treatment Diagnosis:    Insurance/Certification information:  [facility]  Referring Physician:  Nat Brown MD  Plan of care signed (Y/N):    Visit# / total visits:  -  Pain level: 2/10   Time In:  0841  Time Out:  0915    Subjective:  See evaluation    Exercises:  Exercise/Equipment Resistance/Repetitions Other comments     UBE       Upper trap stretch       Levator scap stretch       Corner stretch       Scapular retraction/rows       Shoulder extension                                                                                                     Other Therapeutic Activities:  PT evaluation completed    Home Exercise Program:  N/A    Manual Treatments:  MFR cervical spine x 10 minutes    Modalities:  N/A     Time-in Time-out Total Time   12191  Evaluation Low Complexity 0841 0905 24   23749  Evaluation Med Complexity      48178  Evaluation High Complexity      72085  Ther Ex      37727  Neuro Re-ed        80992  Ther Activities        01547  Manual Therapy  3019 0915 10   41419  E-stim       50621  Ultrasound            Session 2190 5956 87       Treatment/Activity Tolerance:  [x] Patient tolerated treatment well [] Patient limited by fatigue  [] Patient limited by pain  [] Patient limited by other medical complications  [] Other:     Prognosis: [x] Good [] Fair  [] Poor    Patient Requires Follow-up: [x] Yes  [] No    Plan:   [] Continue per plan of care [] Alter current plan (see comments)  [x] Plan of care initiated [] Hold pending MD visit [] Discharge  Plan for Next Session:        Electronically signed by:  Casandra Rivero
diagnosis/prognosis and consents to treatment, plan and goals: [x] Yes    [] No     Thank you for the opportunity to work with your patient. If you have questions or comments, please contact me at number listed above. Electronically signed by: León Gerber, [address] [place] [address], [id number]    Medicare Patients Only     Please sign Physician's Certification and return to: [address] [facility] PHYSICAL THERAPY  [address] [place] [address]  Dept: [phone]  Dept Fax: [phone]  Loc: [phone] Certification / Comments     Frequency/Duration 1-2 days per week for  3-4 weeks. Certification period from 7/25/2023  to 10/24/23. I have reviewed the Plan of Care established for skilled therapy services and certify that the services are required and that they will be provided while the patient is under my care.     Physician's Comments/Revisions:               Physician's Printed Name:                                           Physician's Signature:                                                               Date:

## 2023-08-02 ENCOUNTER — TREATMENT (OUTPATIENT)
Dept: PHYSICAL THERAPY | Age: 63
End: 2023-08-02
Payer: COMMERCIAL

## 2023-08-02 DIAGNOSIS — M54.12 CERVICAL RADICULOPATHY: Primary | ICD-10-CM

## 2023-08-02 PROCEDURE — 97110 THERAPEUTIC EXERCISES: CPT | Performed by: PHYSICAL THERAPIST

## 2023-08-02 PROCEDURE — 97140 MANUAL THERAPY 1/> REGIONS: CPT | Performed by: PHYSICAL THERAPIST

## 2023-08-02 NOTE — PROGRESS NOTES
[facility]          Phone: [phone] Fax: [phone]    Physical Therapy Daily Treatment Note  Date:  2023    Patient Name:  Sarthak Cassidy    :  1960  MRN: [id number]    Evaluating Therapist:  Sharee Luna, [place] [id number]    Restrictions/Precautions:    Diagnosis:     Diagnosis Orders   1. Cervical radiculopathy          Treatment Diagnosis:    Insurance/Certification information:  [facility]  Referring Physician:  Bettyann Lundborg, MD  Plan of care signed (Y/N):    Visit# / total visits:  -  Pain level: 1-2/10   Time In:  0800  Time Out:  0835    Subjective:  Pt reports pain primarily in left upper trap region.     Exercises:  Exercise/Equipment Resistance/Repetitions Other comments     UBE 10 minutes      Upper trap stretch 20 sec x 2 reps      Levator scap stretch 20 sec x 2 reps      Corner stretch 20 sec x 3 reps      Scapular retraction/rows Scapular retraction x 10 reps      Shoulder extension                                                                                                     Other Therapeutic Activities:      Home Exercise Program:  N/A    Manual Treatments:  MFR cervical spine x 15 minutes    Modalities:  N/A     Time-in Time-out Total Time   77659  Evaluation Low Complexity      92908  Evaluation Med Complexity      79527  Evaluation High Complexity      33878  Ther Ex 0800 0820 20   13056  Neuro Re-ed        17626  Ther Activities        04578  Manual Therapy  0820 0835 15   97681  E-stim       91946  Ultrasound            Session 0800 0835 35       Treatment/Activity Tolerance:  [x] Patient tolerated treatment well [] Patient limited by fatigue  [] Patient limited by pain  [] Patient limited by other medical complications  [] Other:     Prognosis: [x] Good [] Fair  [] Poor    Patient Requires Follow-up: [x] Yes  [] No    Plan:   [x] Continue per plan of care [] Alter current plan (see comments)  [] Plan of care initiated [] Hold pending

## 2023-08-04 ENCOUNTER — TREATMENT (OUTPATIENT)
Dept: PHYSICAL THERAPY | Age: 63
End: 2023-08-04

## 2023-08-04 DIAGNOSIS — M54.12 CERVICAL RADICULOPATHY: Primary | ICD-10-CM

## 2023-08-04 NOTE — PROGRESS NOTES
[facility]          Phone: [phone] Fax: [phone]    Physical Therapy Daily Treatment Note  Date:  2023    Patient Name:  Norma Villafuerte    :  1960  MRN: [id number]    Evaluating Therapist:  Dori Ruano, [place] [id number]    Restrictions/Precautions:    Diagnosis:     Diagnosis Orders   1. Cervical radiculopathy          Treatment Diagnosis:    Insurance/Certification information:  [facility]  Referring Physician:  Shania Shen MD  Plan of care signed (Y/N):    Visit# / total visits:  3/6-  Pain level: 1-2/10   Time In:  7147  Time Out:  08    Subjective:  Pt reports he is feeling good, feels he is getting better.     Exercises:  Exercise/Equipment Resistance/Repetitions Other comments     UBE 10 minutes      Upper trap stretch 20 sec x 2 reps      Levator scap stretch 20 sec x 2 reps      Corner stretch 20 sec x 3 reps      Scapular retraction/rows RTB x 10 reps      Shoulder extension                                                                                                     Other Therapeutic Activities:      Home Exercise Program:  N/A    Manual Treatments:  MFR cervical spine x 15 minutes    Modalities:  N/A     Time-in Time-out Total Time   34824  Evaluation Low Complexity      40044  Evaluation Med Complexity      30134  Evaluation High Complexity      44529  Ther Ex 0755 0813 18   87294  Neuro Re-ed        98104  Ther Activities        24454  Manual Therapy  0813 0828 15   87688  E-stim       34419  Ultrasound            Session 0755 0828 33       Treatment/Activity Tolerance:  [x] Patient tolerated treatment well [] Patient limited by fatigue  [] Patient limited by pain  [] Patient limited by other medical complications  [] Other:     Prognosis: [x] Good [] Fair  [] Poor    Patient Requires Follow-up: [x] Yes  [] No    Plan:   [x] Continue per plan of care [] Alter current plan (see comments)  [] Plan of care initiated [] Hold pending MD

## 2023-08-15 ENCOUNTER — TREATMENT (OUTPATIENT)
Dept: PHYSICAL THERAPY | Age: 63
End: 2023-08-15
Payer: COMMERCIAL

## 2023-08-15 DIAGNOSIS — M54.12 CERVICAL RADICULOPATHY: Primary | ICD-10-CM

## 2023-08-15 PROCEDURE — 97110 THERAPEUTIC EXERCISES: CPT | Performed by: PHYSICAL THERAPIST

## 2023-08-15 PROCEDURE — 97140 MANUAL THERAPY 1/> REGIONS: CPT | Performed by: PHYSICAL THERAPIST

## 2023-08-15 NOTE — PROGRESS NOTES
[facility]          Phone: [phone] Fax: [phone]    Physical Therapy Daily Treatment Note  Date:  8/15/2023    Patient Name:  Frank Newsome    :  1960  MRN: [id number]    Evaluating Therapist:  Ana Byrne, [place] [id number]    Restrictions/Precautions:    Diagnosis:     Diagnosis Orders   1. Cervical radiculopathy          Treatment Diagnosis:    Insurance/Certification information:  [facility]  Referring Physician:  Bora Armando MD  Plan of care signed (Y/N):    Visit# / total visits:  -  Pain level: 1-2/10   Time In:  0719  Time Out:  1760    Subjective:  Pt without complaint.     Exercises:  Exercise/Equipment Resistance/Repetitions Other comments     UBE 10 minutes      Upper trap stretch 20 sec x 3 reps      Levator scap stretch 20 sec x 3 reps      Corner stretch 20 sec x 3 reps      Scapular retraction/rows RTB x 15 reps      Shoulder extension RTB x 15 reps                                                                                                    Other Therapeutic Activities:      Home Exercise Program:  N/A    Manual Treatments:  MFR cervical spine x 15 minutes    Modalities:  N/A     Time-in Time-out Total Time   20091  Evaluation Low Complexity      74019  Evaluation Med Complexity      71635  Evaluation High Complexity      47825  Ther Ex 0719 0740 21   29709  Neuro Re-ed        89764  Ther Activities        44898  Manual Therapy  0740 1203 15   74526  E-stim       58828  Ultrasound            Session 0719 0969 36       Treatment/Activity Tolerance:  [x] Patient tolerated treatment well [] Patient limited by fatigue  [] Patient limited by pain  [] Patient limited by other medical complications  [] Other:     Prognosis: [x] Good [] Fair  [] Poor    Patient Requires Follow-up: [x] Yes  [] No    Plan:   [x] Continue per plan of care [] Alter current plan (see comments)  [] Plan of care initiated [] Hold pending MD visit [] Discharge  Plan for

## 2023-08-18 ENCOUNTER — TREATMENT (OUTPATIENT)
Dept: PHYSICAL THERAPY | Age: 63
End: 2023-08-18

## 2023-08-18 DIAGNOSIS — M54.12 CERVICAL RADICULOPATHY: Primary | ICD-10-CM

## 2023-08-18 NOTE — PROGRESS NOTES
[facility]          Phone: [phone] Fax: [phone]    Physical Therapy Daily Treatment Note  Date:  2023    Patient Name:  Maximo Traylor    :  1960  MRN: [id number]    Evaluating Therapist:  Martina Guo, [place] [id number]    Restrictions/Precautions:    Diagnosis:     Diagnosis Orders   1. Cervical radiculopathy          Treatment Diagnosis:    Insurance/Certification information:  [facility]  Referring Physician:  Karey aBh MD  Plan of care signed (Y/N):    Visit# / total visits:  -  Pain level: 1-2/10   Time In:  2324  Time Out:  0835    Subjective:  Pt reports the pain moves around. States the pain was in the left shoulder blade yesterday.     Exercises:  Exercise/Equipment Resistance/Repetitions Other comments     UBE 10 minutes      Upper trap stretch 20 sec x 3 reps      Levator scap stretch 20 sec x 3 reps      Corner stretch 20 sec x 3 reps      Scapular retraction/rows RTB x 15 reps      Shoulder extension RTB x 15 reps      Horizontal shoulder abduction  RTB x 10 reps                                                                                             Other Therapeutic Activities:      Home Exercise Program:  N/A    Manual Treatments:  MFR cervical spine x 15 minutes    Modalities:  N/A     Time-in Time-out Total Time   26719  Evaluation Low Complexity      78478  Evaluation Med Complexity      06627  Evaluation High Complexity      59302  Ther Ex 0756 0820 24   45186  Neuro Re-ed        47723  Ther Activities        28811  Manual Therapy  0820 0835 15   78775  E-stim       14392  Ultrasound            Session 0756 0835 39       Treatment/Activity Tolerance:  [x] Patient tolerated treatment well [] Patient limited by fatigue  [] Patient limited by pain  [] Patient limited by other medical complications  [] Other:     Prognosis: [x] Good [] Fair  [] Poor    Patient Requires Follow-up: [x] Yes  [] No    Plan:   [x] Continue per plan of

## 2023-08-24 ENCOUNTER — TREATMENT (OUTPATIENT)
Dept: PHYSICAL THERAPY | Age: 63
End: 2023-08-24

## 2023-08-24 DIAGNOSIS — M54.12 CERVICAL RADICULOPATHY: Primary | ICD-10-CM

## 2023-08-24 NOTE — PROGRESS NOTES
[facility]          Phone: [phone] Fax: [phone]    Physical Therapy Daily Treatment Note  Date:  2023    Patient Name:  Maximo Traylor    :  1960  MRN: [id number]    Evaluating Therapist:  Martina Guo, SANTA [id number]    Restrictions/Precautions:    Diagnosis:     Diagnosis Orders   1. Cervical radiculopathy          Treatment Diagnosis:    Insurance/Certification information:  [facility]  Referring Physician:  Karey Bah MD  Plan of care signed (Y/N):    Visit# / total visits:  -  Pain level: 1/10   Time In:  4095  Time Out:  0820    Subjective:  Pt reports he feels pretty good today. Exercises:  Exercise/Equipment Resistance/Repetitions Other comments                                                                                                                 Other Therapeutic Activities:  Measurements obtained for discharge. See discharge summary for details.     Home Exercise Program:  N/A    Manual Treatments:  MFR cervical spine x 15 minutes    Modalities:  N/A     Time-in Time-out Total Time   49655  Evaluation Low Complexity      82692  Evaluation Med Complexity      87387  Evaluation High Complexity      67896  Ther Ex 9731 0805 8   08295  Neuro Re-ed        86952  Ther Activities        67842  Manual Therapy  0805 0820 15   73814  E-stim       73456  Ultrasound            Session 191820       Treatment/Activity Tolerance:  [x] Patient tolerated treatment well [] Patient limited by fatigue  [] Patient limited by pain  [] Patient limited by other medical complications  [] Other:     Prognosis: [x] Good [] Fair  [] Poor    Patient Requires Follow-up: [] Yes  [x] No    Plan:   [] Continue per plan of care [] Alter current plan (see comments)  [] Plan of care initiated [] Hold pending MD visit [x] Discharge  Plan for Next Session:        Electronically signed by:  Vivian Mora[address] [id number]

## 2023-09-19 DIAGNOSIS — E78.00 PURE HYPERCHOLESTEROLEMIA: ICD-10-CM

## 2023-09-19 DIAGNOSIS — G47.9 SLEEP DISTURBANCE: ICD-10-CM

## 2023-09-19 DIAGNOSIS — G47.00 INSOMNIA, UNSPECIFIED TYPE: ICD-10-CM

## 2023-09-22 RX ORDER — ROSUVASTATIN CALCIUM 40 MG/1
40 TABLET, COATED ORAL DAILY
Qty: 90 TABLET | Refills: 1 | Status: SHIPPED | OUTPATIENT
Start: 2023-09-22

## 2023-09-22 RX ORDER — TRAZODONE HYDROCHLORIDE 50 MG/1
50 TABLET ORAL NIGHTLY
Qty: 90 TABLET | Refills: 1 | Status: SHIPPED | OUTPATIENT
Start: 2023-09-22

## 2023-10-20 DIAGNOSIS — K21.9 GASTROESOPHAGEAL REFLUX DISEASE, UNSPECIFIED WHETHER ESOPHAGITIS PRESENT: ICD-10-CM

## 2023-10-23 RX ORDER — METOPROLOL SUCCINATE 200 MG/1
200 TABLET, EXTENDED RELEASE ORAL DAILY
Qty: 90 TABLET | Refills: 1 | Status: SHIPPED | OUTPATIENT
Start: 2023-10-23

## 2023-10-23 RX ORDER — OMEPRAZOLE 40 MG/1
40 CAPSULE, DELAYED RELEASE ORAL DAILY
Qty: 90 CAPSULE | Refills: 1 | Status: SHIPPED | OUTPATIENT
Start: 2023-10-23

## 2023-12-15 ENCOUNTER — OFFICE VISIT (OUTPATIENT)
Dept: FAMILY MEDICINE CLINIC | Age: 63
End: 2023-12-15
Payer: COMMERCIAL

## 2023-12-15 VITALS
BODY MASS INDEX: 26.58 KG/M2 | WEIGHT: 196 LBS | SYSTOLIC BLOOD PRESSURE: 122 MMHG | OXYGEN SATURATION: 95 % | HEART RATE: 82 BPM | TEMPERATURE: 96.2 F | DIASTOLIC BLOOD PRESSURE: 84 MMHG | RESPIRATION RATE: 20 BRPM

## 2023-12-15 DIAGNOSIS — R14.0 BLOATING: ICD-10-CM

## 2023-12-15 DIAGNOSIS — Z80.0 FH: COLON CANCER: ICD-10-CM

## 2023-12-15 DIAGNOSIS — E78.00 PURE HYPERCHOLESTEROLEMIA: ICD-10-CM

## 2023-12-15 DIAGNOSIS — K21.9 GASTROESOPHAGEAL REFLUX DISEASE, UNSPECIFIED WHETHER ESOPHAGITIS PRESENT: Primary | ICD-10-CM

## 2023-12-15 DIAGNOSIS — M54.12 CERVICAL RADICULOPATHY: ICD-10-CM

## 2023-12-15 DIAGNOSIS — G47.00 INSOMNIA, UNSPECIFIED TYPE: ICD-10-CM

## 2023-12-15 DIAGNOSIS — I48.91 ATRIAL FIBRILLATION, UNSPECIFIED TYPE (HCC): ICD-10-CM

## 2023-12-15 DIAGNOSIS — R73.9 HYPERGLYCEMIA: ICD-10-CM

## 2023-12-15 LAB
ALBUMIN SERPL-MCNC: 4.4 G/DL (ref 3.5–5.2)
ALP BLD-CCNC: 67 U/L (ref 40–129)
ALT SERPL-CCNC: 21 U/L (ref 0–40)
ANION GAP SERPL CALCULATED.3IONS-SCNC: 15 MMOL/L (ref 7–16)
AST SERPL-CCNC: 22 U/L (ref 0–39)
BILIRUB SERPL-MCNC: 0.5 MG/DL (ref 0–1.2)
BUN BLDV-MCNC: 15 MG/DL (ref 6–23)
CALCIUM SERPL-MCNC: 9.1 MG/DL (ref 8.6–10.2)
CHLORIDE BLD-SCNC: 99 MMOL/L (ref 98–107)
CHOLESTEROL: 197 MG/DL
CO2: 23 MMOL/L (ref 22–29)
CREAT SERPL-MCNC: 1 MG/DL (ref 0.7–1.2)
GFR SERPL CREATININE-BSD FRML MDRD: >60 ML/MIN/1.73M2
GLUCOSE BLD-MCNC: 115 MG/DL (ref 74–99)
HBA1C MFR BLD: 6.1 %
HDLC SERPL-MCNC: 46 MG/DL
LDL CHOLESTEROL: 130 MG/DL
POTASSIUM SERPL-SCNC: 4.9 MMOL/L (ref 3.5–5)
SODIUM BLD-SCNC: 137 MMOL/L (ref 132–146)
TOTAL PROTEIN: 6.9 G/DL (ref 6.4–8.3)
TRIGL SERPL-MCNC: 103 MG/DL
VLDLC SERPL CALC-MCNC: 21 MG/DL

## 2023-12-15 PROCEDURE — 99214 OFFICE O/P EST MOD 30 MIN: CPT | Performed by: FAMILY MEDICINE

## 2023-12-15 PROCEDURE — 83036 HEMOGLOBIN GLYCOSYLATED A1C: CPT | Performed by: FAMILY MEDICINE

## 2023-12-15 NOTE — PROGRESS NOTES
Venipuncture was obtained from right arm. Patient tolerated the procedure without complications or complaints.
Comprehensive Metabolic Panel; Future  -     [facility], DO, [facility], [facility]    Cervical radiculopathy    Bloating  -     Comprehensive Metabolic Panel; Future    FH: colon cancer    Insomnia, unspecified type    Hyperglycemia  -     POCT glycosylated hemoglobin (Hb A1C)      CPM Afib  CPM HLD  CPM insomnia  CPM GERD  F/u with Dr. Dotty Baez      There are no Patient Instructions on file for this visit. Return in about 6 months (around 6/15/2024).         Electronically signed by Larey Schirmer, MD on 12/15/2023 at 9:22 AM

## 2024-02-22 ENCOUNTER — OFFICE VISIT (OUTPATIENT)
Dept: PRIMARY CARE CLINIC | Age: 64
End: 2024-02-22
Payer: COMMERCIAL

## 2024-02-22 VITALS
HEART RATE: 91 BPM | HEIGHT: 72 IN | OXYGEN SATURATION: 96 % | DIASTOLIC BLOOD PRESSURE: 85 MMHG | BODY MASS INDEX: 26.28 KG/M2 | WEIGHT: 194 LBS | SYSTOLIC BLOOD PRESSURE: 132 MMHG | TEMPERATURE: 97.3 F

## 2024-02-22 DIAGNOSIS — J22 ACUTE LOWER RESPIRATORY TRACT INFECTION: ICD-10-CM

## 2024-02-22 DIAGNOSIS — R05.9 COUGH IN ADULT PATIENT: ICD-10-CM

## 2024-02-22 DIAGNOSIS — J22 ACUTE LOWER RESPIRATORY TRACT INFECTION: Primary | ICD-10-CM

## 2024-02-22 LAB
INFLUENZA A ANTIGEN, POC: NEGATIVE
INFLUENZA B ANTIGEN, POC: NEGATIVE
LOT EXPIRE DATE: NORMAL
LOT KIT NUMBER: NORMAL
SARS-COV-2, POC: NORMAL
VALID INTERNAL CONTROL: NORMAL
VENDOR AND KIT NAME POC: NORMAL

## 2024-02-22 PROCEDURE — 99213 OFFICE O/P EST LOW 20 MIN: CPT | Performed by: NURSE PRACTITIONER

## 2024-02-22 PROCEDURE — 87428 SARSCOV & INF VIR A&B AG IA: CPT | Performed by: NURSE PRACTITIONER

## 2024-02-22 RX ORDER — DEXTROMETHORPHAN HYDROBROMIDE AND PROMETHAZINE HYDROCHLORIDE 15; 6.25 MG/5ML; MG/5ML
5 SYRUP ORAL 4 TIMES DAILY PRN
Qty: 180 ML | Refills: 0 | Status: SHIPPED | OUTPATIENT
Start: 2024-02-22 | End: 2024-02-29

## 2024-02-22 RX ORDER — PREDNISONE 10 MG/1
10 TABLET ORAL 2 TIMES DAILY
Qty: 10 TABLET | Refills: 0 | Status: SHIPPED
Start: 2024-02-22 | End: 2024-02-22 | Stop reason: SDUPTHER

## 2024-02-22 RX ORDER — DEXTROMETHORPHAN HYDROBROMIDE AND PROMETHAZINE HYDROCHLORIDE 15; 6.25 MG/5ML; MG/5ML
5 SYRUP ORAL 4 TIMES DAILY PRN
Qty: 180 ML | Refills: 0 | Status: SHIPPED
Start: 2024-02-22 | End: 2024-02-22 | Stop reason: SDUPTHER

## 2024-02-22 RX ORDER — DOXYCYCLINE HYCLATE 100 MG
100 TABLET ORAL 2 TIMES DAILY
Qty: 20 TABLET | Refills: 0 | Status: SHIPPED
Start: 2024-02-22 | End: 2024-02-22 | Stop reason: SDUPTHER

## 2024-02-22 RX ORDER — DOXYCYCLINE HYCLATE 100 MG
100 TABLET ORAL 2 TIMES DAILY
Qty: 20 TABLET | Refills: 0 | Status: SHIPPED | OUTPATIENT
Start: 2024-02-22 | End: 2024-03-03

## 2024-02-22 RX ORDER — PREDNISONE 10 MG/1
10 TABLET ORAL 2 TIMES DAILY
Qty: 10 TABLET | Refills: 0 | Status: SHIPPED | OUTPATIENT
Start: 2024-02-22 | End: 2024-02-27

## 2024-02-22 NOTE — PROGRESS NOTES
infection    2. Cough in adult patient      Disposition:  Disposition: Advised Covid/Influenza tests are negative.  Start Doxycycline, Prednisone and Promethazine DM- advised on sedating effects. Stay well hydrated. Return to walk in care w/any worsening or concerning issues

## 2024-03-25 DIAGNOSIS — G47.00 INSOMNIA, UNSPECIFIED TYPE: ICD-10-CM

## 2024-03-25 DIAGNOSIS — E78.00 PURE HYPERCHOLESTEROLEMIA: ICD-10-CM

## 2024-03-25 DIAGNOSIS — G47.9 SLEEP DISTURBANCE: ICD-10-CM

## 2024-03-25 DIAGNOSIS — K21.9 GASTROESOPHAGEAL REFLUX DISEASE, UNSPECIFIED WHETHER ESOPHAGITIS PRESENT: ICD-10-CM

## 2024-03-26 RX ORDER — METOPROLOL SUCCINATE 200 MG/1
200 TABLET, EXTENDED RELEASE ORAL DAILY
Qty: 90 TABLET | Refills: 1 | Status: SHIPPED | OUTPATIENT
Start: 2024-03-26

## 2024-03-26 RX ORDER — OMEPRAZOLE 40 MG/1
40 CAPSULE, DELAYED RELEASE ORAL DAILY
Qty: 90 CAPSULE | Refills: 1 | Status: SHIPPED | OUTPATIENT
Start: 2024-03-26

## 2024-03-26 RX ORDER — TRAZODONE HYDROCHLORIDE 50 MG/1
50 TABLET ORAL NIGHTLY
Qty: 90 TABLET | Refills: 1 | Status: SHIPPED | OUTPATIENT
Start: 2024-03-26

## 2024-03-26 RX ORDER — ROSUVASTATIN CALCIUM 40 MG/1
40 TABLET, COATED ORAL DAILY
Qty: 90 TABLET | Refills: 1 | Status: SHIPPED | OUTPATIENT
Start: 2024-03-26

## 2024-06-13 ENCOUNTER — OFFICE VISIT (OUTPATIENT)
Dept: FAMILY MEDICINE CLINIC | Age: 64
End: 2024-06-13
Payer: COMMERCIAL

## 2024-06-13 VITALS
WEIGHT: 196 LBS | DIASTOLIC BLOOD PRESSURE: 76 MMHG | OXYGEN SATURATION: 96 % | BODY MASS INDEX: 27.44 KG/M2 | HEIGHT: 71 IN | HEART RATE: 85 BPM | SYSTOLIC BLOOD PRESSURE: 109 MMHG | RESPIRATION RATE: 19 BRPM | TEMPERATURE: 97 F

## 2024-06-13 DIAGNOSIS — E78.00 PURE HYPERCHOLESTEROLEMIA: ICD-10-CM

## 2024-06-13 DIAGNOSIS — I48.91 ATRIAL FIBRILLATION, UNSPECIFIED TYPE (HCC): Primary | ICD-10-CM

## 2024-06-13 DIAGNOSIS — G47.00 INSOMNIA, UNSPECIFIED TYPE: ICD-10-CM

## 2024-06-13 DIAGNOSIS — K21.9 GASTROESOPHAGEAL REFLUX DISEASE, UNSPECIFIED WHETHER ESOPHAGITIS PRESENT: ICD-10-CM

## 2024-06-13 PROCEDURE — 99214 OFFICE O/P EST MOD 30 MIN: CPT | Performed by: FAMILY MEDICINE

## 2024-06-13 SDOH — ECONOMIC STABILITY: FOOD INSECURITY: WITHIN THE PAST 12 MONTHS, YOU WORRIED THAT YOUR FOOD WOULD RUN OUT BEFORE YOU GOT MONEY TO BUY MORE.: NEVER TRUE

## 2024-06-13 SDOH — ECONOMIC STABILITY: FOOD INSECURITY: WITHIN THE PAST 12 MONTHS, THE FOOD YOU BOUGHT JUST DIDN'T LAST AND YOU DIDN'T HAVE MONEY TO GET MORE.: NEVER TRUE

## 2024-06-13 ASSESSMENT — PATIENT HEALTH QUESTIONNAIRE - PHQ9
SUM OF ALL RESPONSES TO PHQ QUESTIONS 1-9: 0
2. FEELING DOWN, DEPRESSED OR HOPELESS: NOT AT ALL
SUM OF ALL RESPONSES TO PHQ QUESTIONS 1-9: 0
SUM OF ALL RESPONSES TO PHQ9 QUESTIONS 1 & 2: 0
SUM OF ALL RESPONSES TO PHQ QUESTIONS 1-9: 0
SUM OF ALL RESPONSES TO PHQ QUESTIONS 1-9: 0

## 2024-06-13 NOTE — PROGRESS NOTES
FM Progress Note    Subjective:   GERD.  Omeprazole 40 helps.  Occasional bloating.    Afib.  Permanent. Aspirin and metoprolol. Prefers not to do cardioversion. On asa, not eliquis.     Elevated BP. Resolved off ibuprofen.     Chest discomfort. Not with exertion, and uses the elliptical often. Gets on right and left side. Mostly in AM.    Dyslipidemia.  Crestor 40. The 10-year ASCVD risk score (Vivienne VIRK, et al., 2019) is: 9.4%    Values used to calculate the score:      Age: 64 years      Sex: Male      Is Non- : No      Diabetic: No      Tobacco smoker: No      Systolic Blood Pressure: 109 mmHg      Is BP treated: No      HDL Cholesterol: 46 mg/dL      Total Cholesterol: 197 mg/dL     Insomnia.  Trazodone helps. Thinking of tapering off    Family history colon cancer.  Dad  from it at 59.  Saw Dr. Carlson in the past, utd on screening    Hyperglycemia.  Lab Results   Component Value Date    LABA1C 6.1 12/15/2023       Working less stressful job now.   Walks dog.    Health Maintenance Due   Topic Date Due    Respiratory Syncytial Virus (RSV) Pregnant or age 60 yrs+ (1 - 1-dose 60+ series) Never done    COVID-19 Vaccine (2023- season) 2023    Depression Screen  2024         Objective:   /76   Pulse 85   Temp 97 °F (36.1 °C)   Resp 19   Ht 1.803 m (5' 11\")   Wt 88.9 kg (196 lb)   SpO2 96%   BMI 27.34 kg/m²   General appearance: NAD, alert and interacting appropriately  HEENT: NCAT, PERRLA, EOMI   Resp: CTAB, no WRC  CVS: irregularly irregular, no MRG  Abdomen: BS +, SNDNT  Extremities: No clubbing, cyanosis, or edema. Warm. Dry.        I have reviewed this patient's previous records.    I have reviewed this patient's labs.    I have reviewed this patient's medications.      Assessment/Plan:    Clifton was seen today for hyperlipidemia.    Diagnoses and all orders for this visit:    Atrial fibrillation, unspecified type (HCC)  -     External Referral To Cardiac

## 2024-06-13 NOTE — PATIENT INSTRUCTIONS
Increase omeprazole to twice per day for a couple weeks, then go back to daily. Make sure there is vitamin B12 and vitamin D in the multivitamin you take.     Continue metoprolol. We will continue to monitor the blood pressure and heart rate.    Consider tapering off trazodone slowly.    Continue crestor.

## 2024-08-08 NOTE — PATIENT INSTRUCTIONS
Letter sent, results logged in Please call every pharmacy around and ask if they provide the new shingles vaccine and how much it costs. If it's affordable please get it and let me know when you've received it. Decrease the aspirin from 325 mg to 81 mg. Continue the metoprolol for the Afib. Patient Education        Learning About the Low FODMAP Diet for Irritable Bowel Syndrome (IBS)  What is the low-FODMAP diet? A low-FODMAP diet is a way to find out what foods give you digestion problems. You stop eating certain high-FODMAP foods for about 2 months. Then you add them back to see how your body reacts. This is called a \"challenge diet. \" A dietitian or doctor can help you follow this diet. FODMAPs are carbohydrates. They are in many types of foods. FODMAP stands for:  · F ermentable. · O ligosaccharides. · D isaccharides. · M onosaccharides. · A nd p olyols. If you have digestive problems, some of these foods can make your symptoms worse. When you are on this diet, you can still eat certain fruits and vegetables. You can also eat certain grains, meats, fish, and lactose-free milks. What is it used for? If you have irritable bowel syndrome (IBS), you can ease your symptoms by not eating some types of foods. Some people also use this diet for inflammatory bowel disease (IBD) or some food intolerances. High-FODMAP foods can be hard to digest. They pull more fluid into your intestines. They are also easily fermented. This can lead to bloating, belly pain, gas, and diarrhea. The low-FODMAP diet can help you figure out what foods to avoid. And it can help you find foods that are easier to digest.  This diet can help with symptoms of some digestive diseases. But it's not a cure. You will still need to manage your condition. How does it work? You will work with a doctor or dietitian when you start the diet. At first, you won't eat any high-FODMAP foods for a few weeks. Go to www.[employer] to learn more about this diet. Annemarie Valerio also find links to an ce for your phone or other device. You'll find low-FODMAP cookbooks there too. After 6 to 8 weeks, you will start to try high-FODMAP foods again. You will add those foods back to your diet, one group at a time. Your doctor or dietitian will probably have you wait a few days before you add each new group of those foods. Keep a food diary. You can write down the foods you try and note how they make you feel. After a few weeks, you may have a better idea of what foods you should avoid and what foods make you feel your best.  What are the risks? There is some risk of not getting all of the vitamins and nutrients you need on the low-FODMAP diet. These include:  · Folate. · Thiamin. · Vitamin B6.  · Calcium. · Vitamin D. Your dietitian or doctor can help you find other sources of these if needed. This diet may limit your fiber intake. Try to plan your meals to include other sources of fiber. What foods are on the low-FODMAP diet? Here is a guide to foods that you can eat, plus the foods that you should avoid, when you are on the low-FODMAP diet. Grains  Okay to eat: Foods made from grains like arrowroot, buckwheat, corn, millet, and oats. You can also eat potato, quinoa, rice, sorghum, tapioca, and teff. Cereals, pasta, breads, corn tortillas and baked goods made from these grains are also okay. (These grains may be labeled \"gluten-free. \")  Avoid: Grains like wheat, barley, and rye. Avoid ingredients such as bulgur, couscous, durum, and semolina. And avoid cereals, breads, and pastas made from these grains. Avoid chickpea, lentil, and pea flour. Proteins  Okay to eat: Most meat, fish, and eggs without high-FODMAP sauces. You can have small amounts of almonds or hazelnuts (10 nuts). Macadamia nuts, peanuts, pecans, pine nuts, and walnuts are also okay. You can also eat nikhil and pumpkin seeds, tofu, and tempeh. Avoid: Beans, chickpeas, lentils, and soybeans.  Avoid pistachio and

## 2024-09-24 DIAGNOSIS — G47.00 INSOMNIA, UNSPECIFIED TYPE: ICD-10-CM

## 2024-09-24 DIAGNOSIS — G47.9 SLEEP DISTURBANCE: ICD-10-CM

## 2024-09-24 DIAGNOSIS — E78.00 PURE HYPERCHOLESTEROLEMIA: ICD-10-CM

## 2024-09-25 RX ORDER — ROSUVASTATIN CALCIUM 40 MG/1
40 TABLET, COATED ORAL DAILY
Qty: 90 TABLET | Refills: 1 | Status: SHIPPED | OUTPATIENT
Start: 2024-09-25

## 2024-09-25 RX ORDER — TRAZODONE HYDROCHLORIDE 50 MG/1
50 TABLET, FILM COATED ORAL NIGHTLY
Qty: 90 TABLET | Refills: 1 | Status: SHIPPED | OUTPATIENT
Start: 2024-09-25

## 2024-10-16 RX ORDER — METOPROLOL SUCCINATE 200 MG/1
1 TABLET, EXTENDED RELEASE ORAL DAILY
COMMUNITY
Start: 2024-03-26

## 2024-10-16 RX ORDER — METOPROLOL SUCCINATE 200 MG/1
200 TABLET, EXTENDED RELEASE ORAL DAILY
Qty: 90 TABLET | Refills: 1 | Status: SHIPPED | OUTPATIENT
Start: 2024-10-16

## 2024-10-16 RX ORDER — OMEPRAZOLE 40 MG/1
1 CAPSULE, DELAYED RELEASE ORAL DAILY
COMMUNITY
Start: 2024-03-26

## 2024-10-16 RX ORDER — ROSUVASTATIN CALCIUM 40 MG/1
1 TABLET, COATED ORAL DAILY
COMMUNITY
Start: 2024-09-25

## 2024-10-16 RX ORDER — TRAZODONE HYDROCHLORIDE 50 MG/1
1 TABLET ORAL NIGHTLY
COMMUNITY
Start: 2024-09-25

## 2024-10-17 ENCOUNTER — OFFICE VISIT (OUTPATIENT)
Dept: CARDIOLOGY | Facility: HOSPITAL | Age: 64
End: 2024-10-17
Payer: COMMERCIAL

## 2024-10-17 VITALS
BODY MASS INDEX: 26.41 KG/M2 | WEIGHT: 195 LBS | DIASTOLIC BLOOD PRESSURE: 87 MMHG | HEIGHT: 72 IN | SYSTOLIC BLOOD PRESSURE: 139 MMHG | OXYGEN SATURATION: 92 % | HEART RATE: 79 BPM

## 2024-10-17 DIAGNOSIS — E78.5 HYPERLIPIDEMIA, UNSPECIFIED HYPERLIPIDEMIA TYPE: ICD-10-CM

## 2024-10-17 DIAGNOSIS — R03.0 ELEVATED BP WITHOUT DIAGNOSIS OF HYPERTENSION: ICD-10-CM

## 2024-10-17 DIAGNOSIS — I48.91 ATRIAL FIBRILLATION, UNSPECIFIED TYPE (MULTI): Primary | ICD-10-CM

## 2024-10-17 DIAGNOSIS — I48.0 PAROXYSMAL ATRIAL FIBRILLATION (MULTI): ICD-10-CM

## 2024-10-17 DIAGNOSIS — Z79.01 CHRONIC ANTICOAGULATION: ICD-10-CM

## 2024-10-17 PROBLEM — I48.21 PERMANENT ATRIAL FIBRILLATION (MULTI): Status: ACTIVE | Noted: 2024-10-17

## 2024-10-17 PROBLEM — E78.01 FAMILIAL HYPERCHOLESTEROLEMIA: Status: ACTIVE | Noted: 2024-10-17

## 2024-10-17 LAB
ATRIAL RATE: 75 BPM
Q ONSET: 217 MS
QRS COUNT: 13 BEATS
QRS DURATION: 102 MS
QT INTERVAL: 398 MS
QTC CALCULATION(BAZETT): 456 MS
QTC FREDERICIA: 436 MS
R AXIS: 36 DEGREES
T AXIS: 46 DEGREES
T OFFSET: 416 MS
VENTRICULAR RATE: 79 BPM

## 2024-10-17 PROCEDURE — 99204 OFFICE O/P NEW MOD 45 MIN: CPT | Performed by: INTERNAL MEDICINE

## 2024-10-17 PROCEDURE — 93005 ELECTROCARDIOGRAM TRACING: CPT | Performed by: INTERNAL MEDICINE

## 2024-10-17 PROCEDURE — 1036F TOBACCO NON-USER: CPT | Performed by: INTERNAL MEDICINE

## 2024-10-17 PROCEDURE — 99214 OFFICE O/P EST MOD 30 MIN: CPT | Performed by: INTERNAL MEDICINE

## 2024-10-17 PROCEDURE — G2211 COMPLEX E/M VISIT ADD ON: HCPCS | Performed by: INTERNAL MEDICINE

## 2024-10-17 PROCEDURE — 3008F BODY MASS INDEX DOCD: CPT | Performed by: INTERNAL MEDICINE

## 2024-10-17 NOTE — PATIENT INSTRUCTIONS
Atrial fibrillation- since 2011- cardiac echo to assess LA size and LV systolic function. His [id number] score is 0 so currently not anticoagulated ( though BP today is elevated, no prior dx of HTN). Should have an attempt at DCC ( with anticoagulation before and after) though will see how large his LA is to predict how successful we will at DCC without antiarrhythmics on board. Will discuss with Dr Hernandez of electrophysiology re antiarrhythmics vs ablation if needed.   Elevated Blood pressure without diagnosis of HTN- to start check BP and HR at home and log for review to see if antihypertensive rx is needed. If hypertensive will need to start eliquis/anticoagulation for his Afib.   Hyperlipidemia-  on crestor 40 mg daily. Possibly  has FH. Will use coronary artery calcium score to set LDL target, but would be at least < 100 if not < 70 depending on presence of absence of coronary artery calcium.  CAD risk assessment- CT for coronary artery calcium score.  Will call with results of echo once completed to decide timing and plan of rx. Will schedule follow up in person in 3 months.    Addendum- regardless of if we proceed with DCC vs ablation or other, will need to be anticoagulated for at least 3 weeks will start anticoagulation with eliquis 5 mg bid  (will consult clinical pharmacy for approval and help with cost of med)

## 2024-10-17 NOTE — PROGRESS NOTES
"Primary Care Physician: Erika Lee MD      Date of Visit: 10/17/2024 11:20 AM EDT  Location of visit: [facility]   Type of Visit: New Patient       HPI / Summary:   Eleno Kenney is a very pleasant 64 y.o. male presenting for management of hyperlipidemia and permanent Atrial Fibrillation    He has a history of permanent Afib dating back to 2011, when asymptomatic but wife hear his HR being irregular. Reportedly had an echo at that time and a stress echo which were \" unremarkable.\" Pt was left in atrial fibrillation since that time without any prior attempts at DCC.   CAD risk factors: + hyperlipidemia(  on rosuvastatin 40 mg daily? FH) , no HTN, no DM though prediabetic for years, no Fhx of CAD, no Fhx of Afib. Nonsmoker. Denies snoring or h/o LA. Drinks rare ETOH and has 3-4 cups fo coffee daily.   Pt does elliptical  30 min 3 x per wek and with that has no CP or SOB. Maximal HR on his meds at peak exercise is 110 bpm.  Denies presyncope or syncope. Only has mild dizziness if gets up quickly.     Outside work up :  Stress test 11/2021: no CP with exercise and EKG was negative. LVEF was normal without regional wall motion abnormalities . Nunez treadmill score was 6. Exercise capacity was average.   Event monitor 10/6/2022-10/19/2022: Afib 100% of the time. HR ranged from 50 bpm-191 bpm Avg 100 bpm. Isolated VE s frequent( 10.5%) VE couplets were rare. VE triplets were rare ( < 1%)   Echo 10/2022:  LVEF 55-60%, o valvular abnormalities, normal LV size, mild biatrial enlargement. Afib.     Had seen EP  at [facility] who planned on  anticoagulation with eliquis followed by hospitalization for anti-arrhythmic rx and DCC in Jan 2023, however patient decide at that time not to be admitted to the hospital. No further rx was done. Patient has CHADsvasc score of 0 is no HTN, though BP today is high. No prior h/o HTN. Patient remains on no anticoagulation. He is rate controlled with metoprolol "     Pt currently denies CP or SOB and denies myalgias on rosuvastatin. Denies presyncope or syncope. Not checking BP at home.       ROS: Full 10 pt review of symptoms of negative unless discussed above.     Problems:   There is no problem list on file for this patient.    Medical History:   No past medical history on file.  Surgical Hx:   No past surgical history on file.   Social Hx:   Tobacco Use: Low Risk  (10/17/2024)    Patient History     Smoking Tobacco Use: Never     Smokeless Tobacco Use: Never     Passive Exposure: Not on file     Alcohol Use: Not At Risk (11/1/2022)    Received from [facility]    AUDIT-C     Frequency of Alcohol Consumption: Never     Average Number of Drinks: Patient does not drink     Frequency of Binge Drinking: Never     Family Hx:   No family history on file.   Exam:   Vitals:   Vitals:    10/17/24 1112 10/17/24 1205   BP: 140/86 139/87   Pulse: 79    SpO2: 92%    Weight: 88.5 kg (195 lb)    Height: 1.829 m (6')      Wt Readings from Last 5 Encounters:   10/17/24 88.5 kg (195 lb)      Constitutional:       Appearance: Healthy appearance. Not in distress.   Pulmonary:      Effort: Pulmonary effort is normal.      Breath sounds: Normal breath sounds.   Cardiovascular:      PMI at left midclavicular line. Normal rate. Irregularly irregular rhythm. S1 with normal intensity. S2 with normal intensity.       Murmurs: There is no murmur.   Pulses:     Intact distal pulses.   Edema:     Peripheral edema absent.   Neurological:      Mental Status: Alert and oriented to person, place, and time.       Labs:    Renal fx 15/1.0,  LFTs normal  Recent Labs     10/25/22  0530   HGBA1C 6.2*     Lipids a [facility] 12/15/2023:  total cholesterol 197,  HDL 46,   ( on  rosuvastatin 40 mg daily) and triglycerides 103    Notable Studies: imaging personally reviewed and summarized by me below  EKG:  Atrial fibrillation at 79 bpm, Q waves in V1-v2. No prior EKG available for  comparison.        Current Outpatient Medications   Medication Instructions    metoprolol succinate XL (Toprol-XL) 200 mg 24 hr tablet 1 tablet, Daily    omeprazole (PriLOSEC) 40 mg DR capsule 1 capsule, Daily    rosuvastatin (Crestor) 40 mg tablet 1 tablet, Daily    traZODone (Desyrel) 50 mg tablet 1 tablet, Nightly     Impressions and Plan:    Pt is a 64 year old man with hyperlipidemia( possibly FH levels) and chronic /permanent Afib since 2011 without any prior attempts at DCC with CHADsvasc score of 0 ( if he has no HTN). Discussed trying to get him back into sinus rhythm with DCC. Given many years of Afib, will check echo to assess LV function as well as LA size. If not too dilated would be appropriate to try DCC even without antiarrhythmics. Will start eliquis 5 mg bid in preparation for DCC. No obvious risk factors or precipitants for Afib at such an early age. Will look at LV wall thickness to be sure no amyloid etc. Will get CT for CAC to risk assess for  CAD and help decide upon LDL target. Currently on max dose rosuvastatin with , not even at target if LDL target < 100. If any coronary artery calcium then LDL target will be < 70. BP in office today is elevated, even on recheck. Will have patient check Bp and Rx at home and log for review to see if antihypertensive rx is needed.  Plan  Afib- start eliquis 5 mg bid. Will give 30 day free card and consult with clinical pharmacy to work on DOAC approval and affordability. Echo to assess LV function and LA size. Likely to proceed with DCC after 3 full weeks of anticoagulation as long as LA not severely dilated.   Elevated Bp- home Bp and HR checks and log for review  CAD risk assessment- CT for CAC  Hyperlipidemia- continue rosuvastatin 40 mg daily for now. Will likely need to add an agent. LDL target to be decided after getting CT for CAC. Will consider adding PCSK9i vs Leqvio. ( Zetia will ot even achieve target of LDL < 100.   Return to clinic in 3  months, but will call with results vs virtual visit after echo to determine plan with respect to DCC.      Patient Instructions:  If you have any questions or need cardiac medication refills, please call my office at [phone],      To reach my office please call [phone]  To schedule an appointment call [phone].          ____________________________________________________________  Kenzie Aviles MD  Division of Cardiovascular Medicine  [facility]

## 2024-10-22 DIAGNOSIS — K21.9 GASTROESOPHAGEAL REFLUX DISEASE, UNSPECIFIED WHETHER ESOPHAGITIS PRESENT: ICD-10-CM

## 2024-10-22 RX ORDER — OMEPRAZOLE 40 MG/1
40 CAPSULE, DELAYED RELEASE ORAL DAILY
Qty: 90 CAPSULE | Refills: 1 | Status: SHIPPED | OUTPATIENT
Start: 2024-10-22

## 2024-10-28 DIAGNOSIS — I48.21 PERMANENT ATRIAL FIBRILLATION (MULTI): Primary | ICD-10-CM

## 2024-10-30 ENCOUNTER — TELEPHONE (OUTPATIENT)
Dept: SCHEDULING | Age: 64
End: 2024-10-30
Payer: COMMERCIAL

## 2024-10-30 DIAGNOSIS — I48.19 PERSISTENT ATRIAL FIBRILLATION (MULTI): ICD-10-CM

## 2024-10-30 DIAGNOSIS — Z01.818 PREOP TESTING: ICD-10-CM

## 2024-10-31 PROBLEM — F41.9 ANXIETY: Status: ACTIVE | Noted: 2018-02-16

## 2024-10-31 PROBLEM — G47.9 SLEEP DISTURBANCE: Status: ACTIVE | Noted: 2018-02-16

## 2024-10-31 PROBLEM — G89.29 CHRONIC LEFT SHOULDER PAIN: Status: ACTIVE | Noted: 2018-10-05

## 2024-10-31 PROBLEM — M25.512 CHRONIC LEFT SHOULDER PAIN: Status: ACTIVE | Noted: 2018-10-05

## 2024-10-31 PROBLEM — K57.92 ACUTE DIVERTICULITIS: Status: ACTIVE | Noted: 2022-10-24

## 2024-10-31 PROBLEM — Z86.0100 HISTORY OF COLON POLYPS: Status: ACTIVE | Noted: 2024-10-31

## 2024-10-31 PROBLEM — D12.6 ADENOMATOUS POLYP OF COLON: Status: ACTIVE | Noted: 2024-10-31

## 2024-11-04 ENCOUNTER — HOSPITAL ENCOUNTER (OUTPATIENT)
Dept: CARDIOLOGY | Facility: HOSPITAL | Age: 64
Discharge: HOME | End: 2024-11-04
Payer: COMMERCIAL

## 2024-11-04 DIAGNOSIS — I48.91 ATRIAL FIBRILLATION, UNSPECIFIED TYPE (MULTI): ICD-10-CM

## 2024-11-04 LAB
AORTIC VALVE MEAN GRADIENT: 2 MMHG
AORTIC VALVE PEAK VELOCITY: 0.9 M/S
AV PEAK GRADIENT: 3 MMHG
AVA (PEAK VEL): 3.06 CM2
AVA (VTI): 2.89 CM2
EJECTION FRACTION APICAL 4 CHAMBER: 68
EJECTION FRACTION: 63 %
LEFT ATRIUM VOLUME AREA LENGTH INDEX BSA: 41.1 ML/M2
LEFT VENTRICLE INTERNAL DIMENSION DIASTOLE: 4.87 CM (ref 3.5–6)
LEFT VENTRICULAR OUTFLOW TRACT DIAMETER: 2.1 CM
RIGHT VENTRICLE FREE WALL PEAK S': 10.6 CM/S
RIGHT VENTRICLE PEAK SYSTOLIC PRESSURE: 29 MMHG
TRICUSPID ANNULAR PLANE SYSTOLIC EXCURSION: 2.2 CM

## 2024-11-04 PROCEDURE — 93306 TTE W/DOPPLER COMPLETE: CPT

## 2024-11-04 PROCEDURE — 93306 TTE W/DOPPLER COMPLETE: CPT | Performed by: INTERNAL MEDICINE

## 2024-11-21 NOTE — PROGRESS NOTES
I had the pleasure seeing Eleno Kenney     Chief Complaint   Patient presents with    Atrial Fibrillation      OUTPATIENT CONSULTATION: Cardiac Electrophysiology  DOS: November 21, 2024  REASON:  Persistent AF  REFERRING:  Kenzie Aviles MD    CHADsvasc score of 0     Current Outpatient Medications   Medication Instructions    apixaban (ELIQUIS) 5 mg, oral, 2 times daily    metoprolol succinate XL (Toprol-XL) 200 mg 24 hr tablet 1 tablet, Daily    omeprazole (PriLOSEC) 40 mg DR capsule 1 capsule, Daily    rosuvastatin (Crestor) 40 mg tablet 1 tablet, Daily    traZODone (Desyrel) 50 mg tablet 1 tablet, Nightly      Eleno Kenney is a 64 y.o. with:     Pmh includes Anxiety, GERD, HL, Hypogonadism, and Tubular Adenoma.      Cardiac Hx  Persistent AF - (index dx 2011) per records, Pt was left in AF since that time without any prior attempts at DCC.  Had seen EP at [facility] who planned on anticoagulation with eliquis followed by hospitalization for anti-arrhythmic rx and DCC in Jan 2023, however patient decide at that time not to be admitted to the hospital.     Today he presents to go over AF treatment options.  He is currently scheduled for cardioversion with me 12/6/24.        CARDIAC TESTING:    -ECHO/ TTE:  (11/4/24) LVEF 63%.  Mild AF dilated.  Pt in AF during exam.    -Echo 10/2022:  LVEF 55-60%, No valvular abnormalities, normal LV size, mild biatrial enlargement. Afib.     -Event monitor 10/6/2022-10/19/2022: Afib 100% of the time. HR ranged from 50 bpm-191 bpm Avg 100 bpm. Isolated VE s frequent( 10.5%) VE couplets were rare. VE triplets were rare ( < 1%)     -Stress test 11/2021: no CP with exercise and EKG was negative. LVEF was normal without regional wall motion abnormalities . Nunez treadmill score was 6. Exercise capacity was average.       Objective   Physical Exam  Constitutional: alert and in no acute distress.   Eyes: no erythema, swelling or discharge from the eye .   Neck: neck is supple,  symmetric, trachea midline, no masses .   Pulmonary: no increased work of breathing or signs of respiratory distress  and lungs clear to auscultation.    Cardiovascular:  regular rhythm, normal S1 and S2, no murmurs , pedal pulses 2+ bilaterally  and no edema .   Abdomen: abdomen non-tender, no masses .   Skin: skin warm and dry, normal skin turgor .   Psychiatric judgment and insight is normal  and oriented to person, place and time .             Assessment/Plan   Persistent atrial fibrillation. We will proceed with cardioversion.

## 2024-11-22 ENCOUNTER — LAB (OUTPATIENT)
Dept: LAB | Facility: LAB | Age: 64
End: 2024-11-22
Payer: COMMERCIAL

## 2024-11-22 ENCOUNTER — OFFICE VISIT (OUTPATIENT)
Dept: CARDIOLOGY | Facility: HOSPITAL | Age: 64
End: 2024-11-22
Payer: COMMERCIAL

## 2024-11-22 VITALS
OXYGEN SATURATION: 97 % | BODY MASS INDEX: 26.95 KG/M2 | HEART RATE: 87 BPM | SYSTOLIC BLOOD PRESSURE: 105 MMHG | HEIGHT: 72 IN | DIASTOLIC BLOOD PRESSURE: 70 MMHG | WEIGHT: 199 LBS

## 2024-11-22 DIAGNOSIS — I48.21 PERMANENT ATRIAL FIBRILLATION (MULTI): ICD-10-CM

## 2024-11-22 DIAGNOSIS — Z01.818 PREOP TESTING: ICD-10-CM

## 2024-11-22 DIAGNOSIS — I48.19 PERSISTENT ATRIAL FIBRILLATION (MULTI): ICD-10-CM

## 2024-11-22 LAB
ANION GAP SERPL CALC-SCNC: 9 MMOL/L (ref 10–20)
BUN SERPL-MCNC: 13 MG/DL (ref 6–23)
CALCIUM SERPL-MCNC: 8.8 MG/DL (ref 8.6–10.3)
CHLORIDE SERPL-SCNC: 103 MMOL/L (ref 98–107)
CO2 SERPL-SCNC: 32 MMOL/L (ref 21–32)
CREAT SERPL-MCNC: 0.89 MG/DL (ref 0.5–1.3)
EGFRCR SERPLBLD CKD-EPI 2021: >90 ML/MIN/1.73M*2
ERYTHROCYTE [DISTWIDTH] IN BLOOD BY AUTOMATED COUNT: 13.3 % (ref 11.5–14.5)
GLUCOSE SERPL-MCNC: 105 MG/DL (ref 74–99)
HCT VFR BLD AUTO: 46.2 % (ref 41–52)
HGB BLD-MCNC: 14.9 G/DL (ref 13.5–17.5)
MCH RBC QN AUTO: 28.2 PG (ref 26–34)
MCHC RBC AUTO-ENTMCNC: 32.3 G/DL (ref 32–36)
MCV RBC AUTO: 88 FL (ref 80–100)
NRBC BLD-RTO: 0 /100 WBCS (ref 0–0)
PLATELET # BLD AUTO: 181 X10*3/UL (ref 150–450)
POTASSIUM SERPL-SCNC: 4.5 MMOL/L (ref 3.5–5.3)
RBC # BLD AUTO: 5.28 X10*6/UL (ref 4.5–5.9)
SODIUM SERPL-SCNC: 139 MMOL/L (ref 136–145)
WBC # BLD AUTO: 6.3 X10*3/UL (ref 4.4–11.3)

## 2024-11-22 PROCEDURE — 99214 OFFICE O/P EST MOD 30 MIN: CPT | Performed by: INTERNAL MEDICINE

## 2024-11-22 PROCEDURE — 93005 ELECTROCARDIOGRAM TRACING: CPT | Performed by: INTERNAL MEDICINE

## 2024-11-22 PROCEDURE — 36415 COLL VENOUS BLD VENIPUNCTURE: CPT

## 2024-11-22 PROCEDURE — 93010 ELECTROCARDIOGRAM REPORT: CPT | Performed by: INTERNAL MEDICINE

## 2024-11-22 PROCEDURE — 99214 OFFICE O/P EST MOD 30 MIN: CPT | Mod: 25 | Performed by: INTERNAL MEDICINE

## 2024-11-22 PROCEDURE — 85027 COMPLETE CBC AUTOMATED: CPT

## 2024-11-22 PROCEDURE — 80048 BASIC METABOLIC PNL TOTAL CA: CPT

## 2024-11-22 PROCEDURE — 3008F BODY MASS INDEX DOCD: CPT | Performed by: INTERNAL MEDICINE

## 2024-11-25 LAB
ATRIAL RATE: 136 BPM
Q ONSET: 218 MS
QRS COUNT: 14 BEATS
QRS DURATION: 102 MS
QT INTERVAL: 392 MS
QTC CALCULATION(BAZETT): 471 MS
QTC FREDERICIA: 443 MS
R AXIS: 38 DEGREES
T AXIS: 47 DEGREES
T OFFSET: 414 MS
VENTRICULAR RATE: 87 BPM

## 2024-11-25 NOTE — PROGRESS NOTES
"  Pharmacist Clinic: Cardiology Management    Eleno Kenney is a 64 y.o. male was referred to Clinical Pharmacy Team for Anticoagulation management.     Referring Provider: Kenzie Aviles MD    THIS IS A NEW PATIENT APPOINTMENT. PATIENT WILL BE ESTABLISHING CARE WITH CLINICAL PHARMACY.    Appointment was completed by Eleno who was reached at primary number.    Allergies Reviewed? Yes    No Known Allergies    No past medical history on file.    Current Outpatient Medications on File Prior to Visit   Medication Sig Dispense Refill    apixaban (Eliquis) 5 mg tablet Take 1 tablet (5 mg) by mouth 2 times a day. 60 tablet 11    melatonin 5 mg tablet,chewable Chew 1 tablet once daily as needed.      metoprolol succinate XL (Toprol-XL) 200 mg 24 hr tablet Take 1 tablet (200 mg) by mouth once daily.      omeprazole (PriLOSEC) 40 mg DR capsule Take 1 capsule (40 mg) by mouth once daily.      psyllium (Metamucil) 3.4 gram packet Take 1 packet by mouth once daily.      rosuvastatin (Crestor) 40 mg tablet Take 1 tablet (40 mg) by mouth once daily.      traZODone (Desyrel) 50 mg tablet Take 1 tablet (50 mg) by mouth once daily at bedtime.       No current facility-administered medications on file prior to visit.         RELEVANT LAB RESULTS:  Lab Results   Component Value Date    CALCIUM 8.8 11/22/2024    CO2 32 11/22/2024     11/22/2024    CREATININE 0.89 11/22/2024    GLUCOSE 105 (H) 11/22/2024    K 4.5 11/22/2024     11/22/2024    BUN 13 11/22/2024    ANIONGAP 9 (L) 11/22/2024     No results found for: \"TRIG\", \"CHOL\", \"LDLCALC\", \"HDL\"  No results found for: \"BMCBC\", \"CBCDIF\"     PHARMACEUTICAL ASSESSMENT:    MEDICATION RECONCILIATION    Home Pharmacy Reviewed? Yes, describe: [facility]    Added:  - metamucil and melatonin  Changed:  - none  Removed:  - none    Drug Interactions? No    Medication Documentation Review Audit       Reviewed by Kenzie Aviles MD (Physician) on 10/17/24 at 1607      Medication Order " Taking? Sig Documenting Provider Last Dose Status   apixaban (Eliquis) 5 mg tablet [id number]  Take 1 tablet (5 mg) by mouth 2 times a day. Kenzie Aviles MD  Active   metoprolol succinate XL (Toprol-XL) 200 mg 24 hr tablet [id number] Yes Take 1 tablet (200 mg) by mouth once daily. Historical Provider, MD  Active   omeprazole (PriLOSEC) 40 mg DR capsule [id number] Yes Take 1 capsule (40 mg) by mouth once daily. Historical Provider, MD  Active   rosuvastatin (Crestor) 40 mg tablet [id number] Yes Take 1 tablet (40 mg) by mouth once daily. Historical Provider, MD  Active   traZODone (Desyrel) 50 mg tablet [id number] Yes Take 1 tablet (50 mg) by mouth once daily at bedtime. Historical Provider, MD  Active                    DISEASE MANAGEMENT ASSESSMENT:     ANTICOAGULATION ASSESSMENT    The ASCVD Risk score (Mague SALINAS, et al., 2019) failed to calculate for the following reasons:    Cannot find a previous HDL lab    Cannot find a previous total cholesterol lab    DIAGNOSIS: prevention of nonvalvular atrial fibrilliation stroke and systemic embolism  - Patient is projected to be on anticoagulation long term - reports he may have duration shortened if he is able to be shocked out of Afib.  - [id number] Score: [0] (only included if diagnosis is atrial fibrillation)   Age: [<65 (0)] [65-74 (+1)] [> 75 (+2)]: 0  Sex: [Male/Female (+1)]: 0  CHF history: [No/Yes(+1)]: 0  Hypertension history: [No/Yes(+1)]: 0  Stroke/TIA/thromboembolism history: [No/Yes(+2)]: 0  Vascular disease history (prior MI, peripheral artery disease, aortic plaque): [No/Yes(+1)]: 0  Diabetes history: [No/Yes(+1)]: 0    CURRENT PHARMACOTHERAPY:   Eliquis 5mg twice daily  0.89mg/dl  64 yoa  Weighs 90.3kg    Affordability/Accessibility: able to afford, but expensive copay  Adherence/Organization: reports adherence  Adverse Reactions: none reported  Recent Hospitalizations: none reported  Recent Falls/Trauma: none reported  Changes in Tobacco or Alcohol Intake:    Tobacco: does not use  Alcohol: a drink twice a month    EDUCATION/COUNSELING:   - Counseled patient on MOA, expectations, duration of therapy, contraindications, administration, and monitoring parameters  - Counseled patient of side effects that are indicative of bleeding such as dark tarry stool, unexplainable bruising, or vomiting up a coffee ground like substance       DISCUSSION/NOTES:   Eleno does not qualify for  PAP due to his income being over the limit for assistance. Currently has private insurance and has not used the  co-pay card in the past to help with the cost. Information send via email.  No abnormal bruising or bleeding reported. States not issues to adherence of Eliquis.    ASSESSMENT:    Assessment/Plan   Problem List Items Addressed This Visit       Permanent atrial fibrillation (Multi)     No dose adjustments needed to Eliquis at today's visit based on their age, weight, and kidney function.           Unfortunately, at this time, Eleno Kenney is ineligible to receive financial assistance through  Patient Assistance Program because income is >400% FPL.       RECOMMENDATIONS/PLAN:    CONTINUE  Eliquis 5mg BID    Last Appnt with Referring Provider: 11/4/24  Next Appnt with Referring Provider: 1/23/25  Clinical Pharmacist follow up: as needed by patient/provider request  VAF/Application Expiration: No  Type of Encounter: Brooks Alaniz PharmD    Verbal consent to manage patient's drug therapy was obtained from the patient . They were informed they may decline to participate or withdraw from participation in pharmacy services at any time.    Continue all meds under the continuation of care with the referring provider and clinical pharmacy team.

## 2024-11-26 ENCOUNTER — APPOINTMENT (OUTPATIENT)
Dept: PHARMACY | Facility: HOSPITAL | Age: 64
End: 2024-11-26
Payer: COMMERCIAL

## 2024-11-26 DIAGNOSIS — I48.21 PERMANENT ATRIAL FIBRILLATION (MULTI): ICD-10-CM

## 2024-11-26 RX ORDER — ASCORBIC ACID 125 MG
1 TABLET,CHEWABLE ORAL DAILY PRN
COMMUNITY

## 2024-11-26 NOTE — Clinical Note
Eleno does not qualify for  PAP at this time for Eliquis. Given information for copay card to help with cost

## 2024-12-06 ENCOUNTER — HOSPITAL ENCOUNTER (OUTPATIENT)
Facility: HOSPITAL | Age: 64
Setting detail: OUTPATIENT SURGERY
Discharge: HOME | End: 2024-12-06
Attending: INTERNAL MEDICINE | Admitting: INTERNAL MEDICINE
Payer: COMMERCIAL

## 2024-12-06 ENCOUNTER — APPOINTMENT (OUTPATIENT)
Dept: CARDIOLOGY | Facility: HOSPITAL | Age: 64
End: 2024-12-06
Payer: COMMERCIAL

## 2024-12-06 ENCOUNTER — ANESTHESIA (OUTPATIENT)
Dept: CARDIOLOGY | Facility: HOSPITAL | Age: 64
End: 2024-12-06
Payer: COMMERCIAL

## 2024-12-06 ENCOUNTER — ANESTHESIA EVENT (OUTPATIENT)
Dept: CARDIOLOGY | Facility: HOSPITAL | Age: 64
End: 2024-12-06
Payer: COMMERCIAL

## 2024-12-06 VITALS
HEART RATE: 94 BPM | BODY MASS INDEX: 27.16 KG/M2 | SYSTOLIC BLOOD PRESSURE: 136 MMHG | RESPIRATION RATE: 22 BRPM | DIASTOLIC BLOOD PRESSURE: 96 MMHG | HEIGHT: 71 IN | OXYGEN SATURATION: 100 % | WEIGHT: 194 LBS

## 2024-12-06 DIAGNOSIS — I48.21 PERMANENT ATRIAL FIBRILLATION (MULTI): ICD-10-CM

## 2024-12-06 LAB — EJECTION FRACTION: 63 %

## 2024-12-06 PROCEDURE — 92960 CARDIOVERSION ELECTRIC EXT: CPT | Mod: XP | Performed by: INTERNAL MEDICINE

## 2024-12-06 PROCEDURE — 3700000001 HC GENERAL ANESTHESIA TIME - INITIAL BASE CHARGE: Performed by: INTERNAL MEDICINE

## 2024-12-06 PROCEDURE — 3700000002 HC GENERAL ANESTHESIA TIME - EACH INCREMENTAL 1 MINUTE: Performed by: INTERNAL MEDICINE

## 2024-12-06 PROCEDURE — 99152 MOD SED SAME PHYS/QHP 5/>YRS: CPT

## 2024-12-06 PROCEDURE — 93325 DOPPLER ECHO COLOR FLOW MAPG: CPT | Performed by: INTERNAL MEDICINE

## 2024-12-06 PROCEDURE — 7100000010 HC PHASE TWO TIME - EACH INCREMENTAL 1 MINUTE: Performed by: INTERNAL MEDICINE

## 2024-12-06 PROCEDURE — 93320 DOPPLER ECHO COMPLETE: CPT

## 2024-12-06 PROCEDURE — 2500000004 HC RX 250 GENERAL PHARMACY W/ HCPCS (ALT 636 FOR OP/ED): Performed by: ANESTHESIOLOGIST ASSISTANT

## 2024-12-06 PROCEDURE — 2500000005 HC RX 250 GENERAL PHARMACY W/O HCPCS: Performed by: STUDENT IN AN ORGANIZED HEALTH CARE EDUCATION/TRAINING PROGRAM

## 2024-12-06 PROCEDURE — 93312 ECHO TRANSESOPHAGEAL: CPT | Performed by: INTERNAL MEDICINE

## 2024-12-06 PROCEDURE — 93320 DOPPLER ECHO COMPLETE: CPT | Performed by: INTERNAL MEDICINE

## 2024-12-06 PROCEDURE — 99152 MOD SED SAME PHYS/QHP 5/>YRS: CPT | Performed by: INTERNAL MEDICINE

## 2024-12-06 PROCEDURE — 7100000009 HC PHASE TWO TIME - INITIAL BASE CHARGE: Performed by: INTERNAL MEDICINE

## 2024-12-06 PROCEDURE — 92960 CARDIOVERSION ELECTRIC EXT: CPT | Performed by: INTERNAL MEDICINE

## 2024-12-06 RX ORDER — METOCLOPRAMIDE HYDROCHLORIDE 5 MG/ML
10 INJECTION INTRAMUSCULAR; INTRAVENOUS ONCE AS NEEDED
OUTPATIENT
Start: 2024-12-06

## 2024-12-06 RX ORDER — PHENYLEPHRINE HCL IN 0.9% NACL 1 MG/10 ML
SYRINGE (ML) INTRAVENOUS AS NEEDED
Status: DISCONTINUED | OUTPATIENT
Start: 2024-12-06 | End: 2024-12-06

## 2024-12-06 RX ORDER — LABETALOL HYDROCHLORIDE 5 MG/ML
5 INJECTION, SOLUTION INTRAVENOUS ONCE AS NEEDED
OUTPATIENT
Start: 2024-12-06

## 2024-12-06 RX ORDER — OXYCODONE HYDROCHLORIDE 5 MG/1
5 TABLET ORAL EVERY 4 HOURS PRN
OUTPATIENT
Start: 2024-12-06

## 2024-12-06 RX ORDER — LIDOCAINE IN NACL,ISO-OSMOT/PF 30 MG/3 ML
0.1 SYRINGE (ML) INJECTION ONCE
OUTPATIENT
Start: 2024-12-06 | End: 2024-12-06

## 2024-12-06 RX ORDER — LIDOCAINE HYDROCHLORIDE 20 MG/ML
SOLUTION OROPHARYNGEAL
Status: DISCONTINUED
Start: 2024-12-06 | End: 2024-12-06 | Stop reason: HOSPADM

## 2024-12-06 RX ORDER — LIDOCAINE HYDROCHLORIDE 20 MG/ML
SOLUTION OROPHARYNGEAL AS NEEDED
Status: DISCONTINUED | OUTPATIENT
Start: 2024-12-06 | End: 2024-12-06 | Stop reason: HOSPADM

## 2024-12-06 RX ORDER — GLYCOPYRROLATE 0.2 MG/ML
INJECTION INTRAMUSCULAR; INTRAVENOUS AS NEEDED
Status: DISCONTINUED | OUTPATIENT
Start: 2024-12-06 | End: 2024-12-06

## 2024-12-06 RX ORDER — PROPOFOL 10 MG/ML
INJECTION, EMULSION INTRAVENOUS AS NEEDED
Status: DISCONTINUED | OUTPATIENT
Start: 2024-12-06 | End: 2024-12-06

## 2024-12-06 RX ORDER — ONDANSETRON HYDROCHLORIDE 2 MG/ML
4 INJECTION, SOLUTION INTRAVENOUS ONCE AS NEEDED
OUTPATIENT
Start: 2024-12-06

## 2024-12-06 NOTE — ANESTHESIA POSTPROCEDURE EVALUATION
Patient: Eleno Kenney    Procedure Summary       Date: 12/06/24 [place] [facility] [id number] [place] [facility] Card[place] Cath Lab    Anesthesia Start: 0928 Anesthesia Stop:     Procedure: Cardioversion Diagnosis:       Permanent atrial fibrillation (Multi)      (Permanent atrial fibrillation (Multi) [I48.21])    Providers: Shine Hernandez MD Responsible Provider: Mt Gonsales MD    Anesthesia Type: MAC ASA Status: 3            Anesthesia Type: MAC    Vitals Value Taken Time   BP 96/59 12/06/24 1001   Temp 36.1 12/06/24 1001   Pulse 63 12/06/24 1001   Resp 16 12/06/24 1001   SpO2 97 12/06/24 1001       Anesthesia Post Evaluation    Patient location during evaluation: PACU  Patient participation: complete - patient participated  Level of consciousness: awake and alert  Pain management: adequate  Airway patency: patent  Cardiovascular status: acceptable and hemodynamically stable  Respiratory status: acceptable and room air  Hydration status: acceptable  Postoperative Nausea and Vomiting: none        No notable events documented.

## 2024-12-06 NOTE — ANESTHESIA PREPROCEDURE EVALUATION
Patient: Eleno Kenney    Procedure Information       Date/Time: 12/06/24 0800    Procedure: Cardioversion - Needs DAYANARA prior to cardioversion    [place] [facility] [place] [id number] EP PRE/POST [place] 1 / [facility] [place] [facility] 3529 Cardiac Cath Lab    Providers: Shine Hernandez MD            Relevant Problems   Anesthesia (within normal limits)      Cardiac   (+) Familial hypercholesterolemia   (+) Hyperlipidemia   (+) Permanent atrial fibrillation (Multi)      Pulmonary (within normal limits)      Neuro   (+) Anxiety      GI   (+) GERD (gastroesophageal reflux disease)      /Renal (within normal limits)      Liver (within normal limits)      Endocrine (within normal limits)      Hematology (within normal limits)      Musculoskeletal (within normal limits)      HEENT (within normal limits)      ID (within normal limits)      Skin (within normal limits)      GYN (within normal limits)       Clinical information reviewed:    Allergies                NPO Detail:  No data recorded     Physical Exam    Airway  Mallampati: II  TM distance: >3 FB     Cardiovascular   Rhythm: regular  Rate: normal     Dental    Pulmonary - normal exam     Abdominal - normal exam           Anesthesia Plan    History of general anesthesia?: yes  History of complications of general anesthesia?: no    ASA 3     MAC     intravenous induction   Postoperative administration of opioids is intended.  Anesthetic plan and risks discussed with patient.  Use of blood products discussed with patient who consented to blood products.    Plan discussed with CAA and attending.

## 2024-12-06 NOTE — H&P
History Of Present Illness  Eleno Kenney is a 64 y.o. male presenting with Atrial Fibrillation for cardioversion.     Past Medical History  No past medical history on file.    Surgical History  No past surgical history on file.     Social History  He reports that he has never smoked. He has never used smokeless tobacco. No history on file for alcohol use and drug use.    Family History  No family history on file.     Allergies  Patient has no known allergies.    Review of Systems   All other systems reviewed and are negative.       Physical Exam  Vitals reviewed.   Constitutional:       General: He is not in acute distress.     Appearance: Normal appearance. He is normal weight.   HENT:      Head: Normocephalic and atraumatic.      Nose: Nose normal.      Mouth/Throat:      Mouth: Mucous membranes are moist.      Pharynx: Oropharynx is clear.   Eyes:      Extraocular Movements: Extraocular movements intact.      Conjunctiva/sclera: Conjunctivae normal.      Pupils: Pupils are equal, round, and reactive to light.   Cardiovascular:      Rate and Rhythm: Normal rate and regular rhythm.      Pulses: Normal pulses.      Heart sounds: No murmur heard.     No friction rub. No gallop.   Pulmonary:      Effort: Pulmonary effort is normal.      Breath sounds: Normal breath sounds.   Abdominal:      General: Abdomen is flat. Bowel sounds are normal.      Palpations: Abdomen is soft.   Musculoskeletal:         General: Normal range of motion.      Cervical back: Normal range of motion.   Skin:     General: Skin is warm and dry.      Capillary Refill: Capillary refill takes less than 2 seconds.   Neurological:      General: No focal deficit present.      Mental Status: He is alert and oriented to person, place, and time. Mental status is at baseline.   Psychiatric:         Mood and Affect: Mood normal.         Behavior: Behavior normal.         Thought Content: Thought content normal.         Judgment: Judgment normal.  "         Last Recorded Vitals  Blood pressure (!) 136/96, pulse 94, resp. rate 22, height 1.803 m (5' 11\"), weight 88 kg (194 lb), SpO2 100%.    Relevant Results             Assessment/Plan   Assessment & Plan    AF - DAYANARA Negative. We will proceed with cardioversion       I spent 35  minutes in the professional and overall care of this patient.      Shine Hernandez MD    "

## 2024-12-13 ENCOUNTER — OFFICE VISIT (OUTPATIENT)
Dept: FAMILY MEDICINE CLINIC | Age: 64
End: 2024-12-13

## 2024-12-13 VITALS
WEIGHT: 199.6 LBS | HEIGHT: 71 IN | DIASTOLIC BLOOD PRESSURE: 70 MMHG | OXYGEN SATURATION: 97 % | BODY MASS INDEX: 27.94 KG/M2 | HEART RATE: 82 BPM | TEMPERATURE: 98 F | SYSTOLIC BLOOD PRESSURE: 115 MMHG | RESPIRATION RATE: 18 BRPM

## 2024-12-13 DIAGNOSIS — K21.9 GASTROESOPHAGEAL REFLUX DISEASE, UNSPECIFIED WHETHER ESOPHAGITIS PRESENT: Primary | ICD-10-CM

## 2024-12-13 DIAGNOSIS — G47.00 INSOMNIA, UNSPECIFIED TYPE: ICD-10-CM

## 2024-12-13 DIAGNOSIS — I48.91 ATRIAL FIBRILLATION, UNSPECIFIED TYPE (HCC): ICD-10-CM

## 2024-12-13 DIAGNOSIS — E78.00 PURE HYPERCHOLESTEROLEMIA: ICD-10-CM

## 2024-12-13 DIAGNOSIS — R73.9 HYPERGLYCEMIA: ICD-10-CM

## 2024-12-13 LAB — HBA1C MFR BLD: 6.2 %

## 2024-12-13 SDOH — ECONOMIC STABILITY: FOOD INSECURITY: WITHIN THE PAST 12 MONTHS, THE FOOD YOU BOUGHT JUST DIDN'T LAST AND YOU DIDN'T HAVE MONEY TO GET MORE.: NEVER TRUE

## 2024-12-13 SDOH — ECONOMIC STABILITY: FOOD INSECURITY: WITHIN THE PAST 12 MONTHS, YOU WORRIED THAT YOUR FOOD WOULD RUN OUT BEFORE YOU GOT MONEY TO BUY MORE.: NEVER TRUE

## 2024-12-13 SDOH — ECONOMIC STABILITY: INCOME INSECURITY: HOW HARD IS IT FOR YOU TO PAY FOR THE VERY BASICS LIKE FOOD, HOUSING, MEDICAL CARE, AND HEATING?: NOT HARD AT ALL

## 2024-12-13 NOTE — PATIENT INSTRUCTIONS
Please call every pharmacy around and ask if they provide the RSV vaccine. If it's affordable please get it and let me know when you've received it.     Use Smartpay meal tracking ce for 2 weeks. Keep the carbs less than 30% of daily intake.    Continue crestor for the cholesterol.     Follow up Cardiology as planned to see if repeat cardioversion is appropriate.    Taper off trazodone sloooooowly.

## 2024-12-13 NOTE — PROGRESS NOTES
FM Progress Note    Subjective:   GERD.  Omeprazole 40 helps.  Occasional bloating.    Afib.  Permanent. Eliquis and metoprolol. S/p cardioversion within last couple months. Seemed to work for 2-3 days, then palps again.     Elevated BP. Resolved off ibuprofen.     Chest discomfort. Not with exertion, and uses the elliptical often. Gets on right and left side. Mostly in AM.    Dyslipidemia.  Crestor 40. The 10-year ASCVD risk score (Vivienne VIRK, et al., 2019) is: 10.3%    Values used to calculate the score:      Age: 64 years      Sex: Male      Is Non- : No      Diabetic: No      Tobacco smoker: No      Systolic Blood Pressure: 115 mmHg      Is BP treated: No      HDL Cholesterol: 46 mg/dL      Total Cholesterol: 197 mg/dL     Insomnia.  Trazodone helps. Thinking of tapering off    Family history colon cancer.  Dad  from it at 59.  Saw Dr. Carlson in the past, utd on screening    Hyperglycemia.  Lab Results   Component Value Date    LABA1C 6.1 12/15/2023       Working less stressful job now, but time consuming.    Dog  at 15.5, not planning to get another at this time.     Health Maintenance Due   Topic Date Due    Respiratory Syncytial Virus (RSV) Pregnant or age 60 yrs+ (1 - Risk 60-74 years 1-dose series) Never done    COVID-19 Vaccine (2023- season) 2024    A1C test (Diabetic or Prediabetic)  12/15/2024    Lipids  12/15/2024         Objective:   /70   Pulse 82   Temp 98 °F (36.7 °C)   Resp 18   Ht 1.803 m (5' 11\")   Wt 90.5 kg (199 lb 9.6 oz)   SpO2 97%   BMI 27.84 kg/m²   General appearance: NAD, alert and interacting appropriately  HEENT: NCAT, PERRLA, EOMI   Resp: CTAB, no WRC  CVS: irregularly irregular, no MRG  Abdomen: BS +, SNDNT  Extremities: No clubbing, cyanosis, or edema. Warm. Dry.        I have reviewed this patient's previous records.    I have reviewed this patient's labs.    I have reviewed this patient's

## 2025-01-17 ENCOUNTER — HOSPITAL ENCOUNTER (OUTPATIENT)
Dept: CARDIOLOGY | Facility: HOSPITAL | Age: 65
Discharge: HOME | End: 2025-01-17
Payer: COMMERCIAL

## 2025-01-17 ENCOUNTER — OFFICE VISIT (OUTPATIENT)
Dept: CARDIOLOGY | Facility: HOSPITAL | Age: 65
End: 2025-01-17
Payer: COMMERCIAL

## 2025-01-17 VITALS
BODY MASS INDEX: 26.9 KG/M2 | DIASTOLIC BLOOD PRESSURE: 72 MMHG | HEIGHT: 72 IN | HEART RATE: 84 BPM | WEIGHT: 198.6 LBS | SYSTOLIC BLOOD PRESSURE: 125 MMHG | OXYGEN SATURATION: 98 %

## 2025-01-17 DIAGNOSIS — I48.91 ATRIAL FIBRILLATION, UNSPECIFIED TYPE (MULTI): ICD-10-CM

## 2025-01-17 DIAGNOSIS — E78.01 FAMILIAL HYPERCHOLESTEROLEMIA: Primary | ICD-10-CM

## 2025-01-17 DIAGNOSIS — Z79.01 CHRONIC ANTICOAGULATION: ICD-10-CM

## 2025-01-17 LAB
ATRIAL RATE: 375 BPM
BODY SURFACE AREA: 2.14 M2
Q ONSET: 217 MS
QRS COUNT: 14 BEATS
QRS DURATION: 104 MS
QT INTERVAL: 386 MS
QTC CALCULATION(BAZETT): 456 MS
QTC FREDERICIA: 431 MS
R AXIS: 21 DEGREES
T AXIS: 58 DEGREES
T OFFSET: 410 MS
VENTRICULAR RATE: 84 BPM

## 2025-01-17 PROCEDURE — 93005 ELECTROCARDIOGRAM TRACING: CPT | Mod: 59 | Performed by: INTERNAL MEDICINE

## 2025-01-17 PROCEDURE — G2211 COMPLEX E/M VISIT ADD ON: HCPCS | Performed by: INTERNAL MEDICINE

## 2025-01-17 PROCEDURE — 93010 ELECTROCARDIOGRAM REPORT: CPT | Performed by: INTERNAL MEDICINE

## 2025-01-17 PROCEDURE — 3008F BODY MASS INDEX DOCD: CPT | Performed by: INTERNAL MEDICINE

## 2025-01-17 PROCEDURE — 99214 OFFICE O/P EST MOD 30 MIN: CPT | Performed by: INTERNAL MEDICINE

## 2025-01-17 PROCEDURE — 93246 EXT ECG>7D<15D RECORDING: CPT

## 2025-01-17 ASSESSMENT — ENCOUNTER SYMPTOMS
DEPRESSION: 0
OCCASIONAL FEELINGS OF UNSTEADINESS: 0
LOSS OF SENSATION IN FEET: 0

## 2025-01-17 NOTE — PROGRESS NOTES
"Primary Care Physician: Cordell Smith MD      Date of Visit: 01/17/2025  9:00 AM EST  Location of visit: [facility]   Type of Visit: Established Patient     HPI / Summary:   Eleno Kenney is a very pleasant 64 y.o. male initially presented for management of hyperlipidemia and permanent Atrial Fibrillation who now returns for follow-up after DAYANARA/DCC on 12/6/2024.      He has a history of permanent Afib dating back to 2011, when asymptomatic but wife hear his HR being irregular. Reportedly had an echo at that time and a stress echo which were \" unremarkable.\" Pt was left in atrial fibrillation since that time without any prior attempts at DCC.   CAD risk factors: + hyperlipidemia(  on rosuvastatin 40 mg daily? FH) , no HTN, no DM though prediabetic for years, no Fhx of CAD, no Fhx of Afib. Nonsmoker. Denies snoring or h/o LA. Drinks rare ETOH and has 3-4 cups fo coffee daily.   Pt does elliptical  30 min 3 x per wek and with that has no CP or SOB. Maximal HR on his meds at peak exercise is 110 bpm.  Denies presyncope or syncope. Only has mild dizziness if gets up quickly.      Outside work up :  Stress test 11/2021: no CP with exercise and EKG was negative. LVEF was normal without regional wall motion abnormalities . Nunez treadmill score was 6. Exercise capacity was average.   Event monitor 10/6/2022-10/19/2022: Afib 100% of the time. HR ranged from 50 bpm-191 bpm Avg 100 bpm. Isolated VE s frequent( 10.5%) VE couplets were rare. VE triplets were rare ( < 1%)   Echo 10/2022:  LVEF 55-60%, o valvular abnormalities, normal LV size, mild biatrial enlargement. Afib.      Had seen EP  at [facility] who planned on  anticoagulation with eliquis followed by hospitalization for anti-arrhythmic rx and DCC in Jan 2023, however patient decide at that time not to be admitted to the hospital. No further rx was done. Patient has CHADsvasc score of 0 is no HTN, though BP today is high. No prior h/o HTN. " Patient previously was on no anticoagulation. He  was rate controlled with metoprolol      Pt currently denies CP or SOB and denies myalgias on rosuvastatin. Denies presyncope or syncope. Not checking BP at home.  Eliquis was started 10/2024, though was initially missing some doses and was taking twice daily rather than every 12 hours   Pt underwent DAYANARA /DCC on 12/6/2024. Currently has no CP or SOB and no palpitaiotns. From looking at HR on watch he thinks he remained in normal rhythm for 3 days. His HR is controlled on metoprolol. Has no myalgias on statin. He is not checking his BP at home. Has no significant bleeding on eliquis ( though has noticed a slight increase in his baseline mild hemorrhoidal bleeding.           ROS: Full 10 pt review of symptoms of negative unless discussed above.     Problems:   Patient Active Problem List   Diagnosis    Permanent atrial fibrillation (Multi)    Familial hypercholesterolemia    Acute diverticulitis    Adenomatous polyp of colon    Anxiety    Chronic left shoulder pain    Diverticulosis    Elevated cholesterol/high density lipoprotein ratio    GERD (gastroesophageal reflux disease)    History of colon polyps    Hyperlipidemia    Hypogonadism male    Sleep disturbance    Tubular adenoma     Medical History:   History reviewed. No pertinent past medical history.  Surgical Hx:   Past Surgical History:   Procedure Laterality Date    CARDIAC ELECTROPHYSIOLOGY PROCEDURE N/A 12/6/2024    Procedure: Cardioversion;  Surgeon: Shine Hernandez MD;  Location: [place] [id number] Cardiac Cath Lab;  Service: Electrophysiology;  Laterality: N/A;  Needs DAYANARA prior to cardioversion      Social Hx:   Tobacco Use: Low Risk  (1/17/2025)    Patient History     Smoking Tobacco Use: Never     Smokeless Tobacco Use: Never     Passive Exposure: Not on file     Alcohol Use: Not At Risk (11/1/2022)    [facility]    AUDIT-C     Frequency of Alcohol Consumption: Never      Average Number of Drinks: Patient does not drink     Frequency of Binge Drinking: Never     Family Hx:   No family history on file.   Exam:   Vitals:   Vitals:    01/17/25 0859   BP: 125/72   Pulse: 84   SpO2: 98%   Weight: 90.1 kg (198 lb 9.6 oz)   Height: 1.829 m (6')     Wt Readings from Last 5 Encounters:   01/17/25 90.1 kg (198 lb 9.6 oz)   12/06/24 88 kg (194 lb)   11/22/24 90.3 kg (199 lb)   10/17/24 88.5 kg (195 lb)      Constitutional:       Appearance: Healthy appearance. Not in distress.   Pulmonary:      Effort: Pulmonary effort is normal.      Breath sounds: Normal breath sounds.   Cardiovascular:      PMI at left midclavicular line. Normal rate. Regularly irregular rhythm. S1 with normal intensity. S2 with normal intensity.       Murmurs: There is no murmur.   Pulses:     Intact distal pulses.   Edema:     Peripheral edema absent.   Neurological:      Mental Status: Alert and oriented to person, place, and time.       Labs:   Recent Labs     11/22/24  0924   WBC 6.3   HGB 14.9   HCT 46.2      MCV 88     Recent Labs     11/22/24  0924      K 4.5      BUN 13   CREATININE 0.89      Recent Labs     10/25/22  0530   HGBA1C 6.2*     Lipids drawn 12/2023 total 197,  HDL 46,  ,  triglycerides 103.   Notable Studies: imaging personally reviewed and summarized by me below  EKG:  Encounter Date: 01/17/25   ECG 12 lead (Clinic Performed)   Result Value    Ventricular Rate 84    Atrial Rate 375    QRS Duration 104    QT Interval 386    QTC Calculation(Bazett) 456    R Axis 21    T Axis 58    QRS Count 14    Q Onset 217    T Offset 410    QTC Fredericia 431    Narrative    Atrial fibrillation  Abnormal ECG  When compared with ECG of 22-NOV-2024 08:51,  No significant change was found       DAYANARA 12/6/2024:  PHYSICIAN INTERPRETATION:  DAYANARA Details: Technically adequate omniplane transesophageal echocardiogram performed. Agitated saline contrast echo was performed to assess for the presence of a  patent foramen ovale.  DAYANARA Medication: The patient was sedated by Anesthesia; please refer to anesthesia flow sheet for medications used.  DAYANARA Procedure: The probe was passed without difficulty. Complications encountered during procedure: Patient tolerated the procedure well without any apparent complications.  Left Ventricle: Left ventricular ejection fraction is normal, by visual estimate at 60-65%. The left ventricular cavity size was not assessed. Left ventricular diastolic filling was not assessed.  Left Atrium: The left atrium is normal in size. There is a small patent foramen ovale with predominantly left to right shunting across the atrial septum. A patent foramen ovale was visualized using color Doppler. A bubble study using agitated saline was performed. Bubble study is negative. There is no thrombus visualized in the left atrial appendage.  Right Ventricle: The right ventricle is normal in size. There is normal right ventricular global systolic function.  Right Atrium: The right atrium is normal in size.  Aortic Valve: The aortic valve is trileaflet. There is no evidence of aortic valve regurgitation.  Mitral Valve: The mitral valve is normal in structure. There is trace mitral valve regurgitation.  Tricuspid Valve: The tricuspid valve is structurally normal. There is trace tricuspid regurgitation.  Pulmonic Valve: The pulmonic valve is structurally normal. There is trace pulmonic valve regurgitation.  Pericardium: There is no pericardial effusion noted.  Aorta: The aortic root is abnormal. The aortic root appears normal in size and measures 3.70 cm. No significant aortic plaque is visualized.  Pulmonary Artery: The pulmonary artery is not well visualized.  Systemic Veins: The inferior vena cava was not well visualized.  In comparison to the previous echocardiogram(s): Compared with study dated 11/4/2024, This study demonstrated a PFO through color Doppler with left to right shunt.        CONCLUSIONS:    1. Left ventricular ejection fraction is normal, by visual estimate at 60-65%.   2. There is normal right ventricular global systolic function.   3. Normal aortic root.   4. The pulmonary artery is not well visualized.   5. Small patent foramen ovale.   6. Compared with study dated 11/4/2024, This study demonstrated a PFO through color Doppler with left to right shunt.      11/4/2024  Transthoracic echo  PHYSICIAN INTERPRETATION:  Left Ventricle: Left ventricular ejection fraction is normal, calculated by Churchill's biplane at 63%. The patient is in atrial fibrillation which may influence the estimate of left ventricular function and transvalvular flows. There are no regional left ventricular wall motion abnormalities. The left ventricular cavity size is normal. There is normal septal and normal posterior left ventricular wall thickness. Left ventricular diastolic filling cannot be determined, due to atrial fibrillation/flutter.  Left Atrium: The left atrium is mildly dilated.  Right Ventricle: The right ventricle is normal in size. There is normal right ventricular global systolic function.  Right Atrium: The right atrium is mildly dilated.  Aortic Valve: The aortic valve is trileaflet. The aortic valve dimensionless index is 0.83. There is no evidence of aortic valve regurgitation. The peak instantaneous gradient of the aortic valve is 3 mmHg. The mean gradient of the mitral valve is 2 mmHg.  Mitral Valve: The mitral valve is normal in structure. There is trace mitral valve regurgitation.  Tricuspid Valve: The tricuspid valve is structurally normal. There is trace tricuspid regurgitation. The Doppler estimated RVSP is within normal limits at 29.0 mmHg. Reported right ventricular systolic pressure may be underestimated due to incomplete or suboptimal Doppler envelope.  Pulmonic Valve: The pulmonic valve is structurally normal. There is physiologic pulmonic valve regurgitation.  Pericardium: There is no pericardial  effusion noted.  Aorta: The aortic root is abnormal. The Ao Sinus is 3.70 cm. The Asc Ao is 3.20 cm. There is mild dilatation of the aortic root.  Systemic Veins: The inferior vena cava appears normal in size, with IVC inspiratory collapse greater than 50%.  In comparison to the previous echocardiogram(s): There are no prior studies on this patient for comparison purposes. No prior echocardiogram available for comparison.        CONCLUSIONS:   1. Left ventricular ejection fraction is normal, calculated by Churchill's biplane at 63%.   2. Left ventricular diastolic filling cannot be determined, due to atrial fibrillation/flutter.   3. There is normal right ventricular global systolic function.   4. The left atrium is mildly dilated.   5. Right ventricular systolic pressure is within normal limits.   6. No prior echocardiogram available for comparison.   7. The patient is in atrial fibrillation which may influence the estimate of left ventricular function and transvalvular flows.       Current Outpatient Medications   Medication Instructions    apixaban (ELIQUIS) 5 mg, oral, 2 times daily    melatonin 5 mg tablet,chewable 1 tablet, Daily PRN    metoprolol succinate XL (Toprol-XL) 200 mg 24 hr tablet 1 tablet, Daily    omeprazole (PriLOSEC) 40 mg DR capsule 1 capsule, Daily    psyllium (Metamucil) 3.4 gram packet 1 packet, Daily    rosuvastatin (Crestor) 40 mg tablet 1 tablet, Daily    traZODone (Desyrel) 50 mg tablet 1 tablet, Nightly     Impressions and Plan:    PT is a 64 year old man with hyperlipidemia ( FH levels) with permanent Afib sicne 2011 thg hwith a mildly dilated LA who undere=went DAYANARA DCC 12/6/2024 which was initially successful and possibly lasted in NSR for 3 days, but is back in Afib. He is chronically anticoagulated with eliquis 5 mg bid and will be seeing Dr Hernandez to discuss possible Afib ablation. Placing 2 week cardiac monitor to assess Afib burden ( ie 100% vs PAF) He is currently on max dose  of rosuvastatin and is due for fasting lipid panel. Also awaiting CT for CAC to set LDL target. Will likely need additional agent for lipid management. Although BP was elevated at prior office visit, BP appears normal at home and was normal today in the office.   Plan  Afib- recurred after DCC. Continues on eliquis 5 mg bid and rate controlled with metoprolol succinate 200 mg daily. Will see Dr Hernandez re possible afib ablation.  Prior Elevated BP- reasonable Bp at home. No need for rx at this time.  Hyperlipidemia ( FH) - continue rosuvastatin 40 mg dialy. Get fasting lipids checked. Also await CT for CAC to set LDL target. Will consider addition of PCSK9i vs Inclisiran.   Chronic anticoagulation- elioquis 5 mg bid.   Return to clinic in 3 months.     Patient Instructions:  If you have any questions or need cardiac medication refills, please call my office at [phone],      To reach my office please call [phone]  To schedule an appointment call [phone].          ____________________________________________________________  Kenzie Aviles MD  Division of Cardiovascular Medicine  [facility]  [facility]

## 2025-01-17 NOTE — PATIENT INSTRUCTIONS
Atrial fibrillation- s/p DAYANARA/DCC in December, now back in atrial fibrillation- 2 week cardiac monitor to assess Afib burden. To return to Dr Hernandez to discuss possible Afib ablation. Continue eliquis 5 mg every 12 hours and metoprolol succinate 200 mg daily for rate control  Familial hypercholesterolemia- continue rosuvastatin 40 mg daily for now. Due for repeat fasting lipid panel. Will likely need additional lipid lowering meds ( possibly PCSK9i) but will use coronary artery calcium score to set LDL target.   CAD risk assessment- please schedule CT for Coronary Artery Calcium Score  Chronic anticoagulation with eliquis.   Return to clinic in 3 months.

## 2025-01-21 LAB
ATRIAL RATE: 375 BPM
Q ONSET: 217 MS
QRS COUNT: 14 BEATS
QRS DURATION: 104 MS
QT INTERVAL: 386 MS
QTC CALCULATION(BAZETT): 456 MS
QTC FREDERICIA: 431 MS
R AXIS: 21 DEGREES
T AXIS: 58 DEGREES
T OFFSET: 410 MS
VENTRICULAR RATE: 84 BPM

## 2025-01-23 ENCOUNTER — APPOINTMENT (OUTPATIENT)
Dept: CARDIOLOGY | Facility: HOSPITAL | Age: 65
End: 2025-01-23
Payer: COMMERCIAL

## 2025-01-31 ENCOUNTER — APPOINTMENT (OUTPATIENT)
Dept: CARDIOLOGY | Facility: HOSPITAL | Age: 65
End: 2025-01-31
Payer: COMMERCIAL

## 2025-02-06 LAB — BODY SURFACE AREA: 2.14 M2

## 2025-02-14 ENCOUNTER — OFFICE VISIT (OUTPATIENT)
Dept: CARDIOLOGY | Facility: HOSPITAL | Age: 65
End: 2025-02-14
Payer: COMMERCIAL

## 2025-02-14 ENCOUNTER — HOSPITAL ENCOUNTER (OUTPATIENT)
Dept: CARDIOLOGY | Facility: HOSPITAL | Age: 65
Discharge: HOME | End: 2025-02-14
Payer: COMMERCIAL

## 2025-02-14 VITALS
HEART RATE: 96 BPM | SYSTOLIC BLOOD PRESSURE: 128 MMHG | WEIGHT: 195 LBS | BODY MASS INDEX: 27.3 KG/M2 | DIASTOLIC BLOOD PRESSURE: 64 MMHG | OXYGEN SATURATION: 97 % | HEIGHT: 71 IN

## 2025-02-14 DIAGNOSIS — I48.21 PERMANENT ATRIAL FIBRILLATION (MULTI): Primary | ICD-10-CM

## 2025-02-14 LAB
ATRIAL RATE: 88 BPM
Q ONSET: 217 MS
QRS COUNT: 16 BEATS
QRS DURATION: 106 MS
QT INTERVAL: 362 MS
QTC CALCULATION(BAZETT): 457 MS
QTC FREDERICIA: 423 MS
R AXIS: 64 DEGREES
T AXIS: 221 DEGREES
T OFFSET: 398 MS
VENTRICULAR RATE: 96 BPM

## 2025-02-14 PROCEDURE — 99215 OFFICE O/P EST HI 40 MIN: CPT | Performed by: INTERNAL MEDICINE

## 2025-02-14 PROCEDURE — 3008F BODY MASS INDEX DOCD: CPT | Performed by: INTERNAL MEDICINE

## 2025-02-14 PROCEDURE — 93005 ELECTROCARDIOGRAM TRACING: CPT

## 2025-02-14 NOTE — PROGRESS NOTES
I had the pleasure seeing Eleno Kenney     No chief complaint on file.     He presents for a 6 week s/p AF CVN.  CHADsvasc score of 0     Current Outpatient Medications   Medication Instructions    apixaban (ELIQUIS) 5 mg, oral, 2 times daily    melatonin 5 mg tablet,chewable 1 tablet, Daily PRN    metoprolol succinate XL (Toprol-XL) 200 mg 24 hr tablet 1 tablet, Daily    omeprazole (PriLOSEC) 40 mg DR capsule 1 capsule, Daily    psyllium (Metamucil) 3.4 gram packet 1 packet, Daily    rosuvastatin (Crestor) 40 mg tablet 1 tablet, Daily    traZODone (Desyrel) 50 mg tablet 1 tablet, Nightly      Eleno Kenney is a 64 y.o. with:     Pmh includes Anxiety, GERD, HL, Hypogonadism, and Tubular Adenoma.       Cardiac Hx  Persistent AF - (index dx 2011) per records, Pt was left in AF since that time without any prior attempts at DCC.  Had seen EP at [facility] who planned on anticoagulation with eliquis followed by hospitalization for anti-arrhythmic rx and DCC in Jan 2023, however patient decide at that time not to be admitted to the hospital.      Dec 6, 2024:  s/p DCCV done by me        CARDIAC TESTING:     -DAYANARA: (12/6/24) LVEF 60-65%.  Small PFO noted.   -ECHO/ TTE:  (11/4/24) LVEF 63%.  Mild AF dilated.  Pt in AF during exam.    -Echo 10/2022:  LVEF 55-60%, No valvular abnormalities, normal LV size, mild biatrial enlargement. Afib.      -MONITOR:  Preventice 14d (Jan 2025) showed AF (>99% burden) and PVCs (10% burden).  HR range  bpm with Avg HR 76 bpm  -Event monitor: (10/2022) Afib 100% of the time. HR ranged from 50 bpm-191 bpm Avg 100 bpm. Isolated VE s frequent( 10.5%) VE couplets were rare. VE triplets were rare ( < 1%)      -Stress test 11/2021: no CP with exercise and EKG was negative. LVEF was normal without regional wall motion abnormalities . Nunez treadmill score was 6. Exercise capacity was average.       Objective   Physical Exam  Constitutional: alert and in no acute distress.   Eyes: no erythema,  swelling or discharge from the eye .   Neck: neck is supple, symmetric, trachea midline, no masses .   Pulmonary: no increased work of breathing or signs of respiratory distress  and lungs clear to auscultation.    Cardiovascular:  regular rhythm, normal S1 and S2, no murmurs , pedal pulses 2+ bilaterally  and no edema .   Abdomen: abdomen non-tender, no masses .   Skin: skin warm and dry, normal skin turgor .   Psychiatric judgment and insight is normal  and oriented to person, place and time .             Assessment/Plan   Persistent AF - recurred. He felt much better in NSR. We discussed the options , we will proceed with PVI. The risks of the procedure were discussed in detail, including but not limited to infection, blood vessel damage, heart injury or perforation necessitating emergency cardiac surgery, heart attack or stroke, atrial-esophageal fistula, phrenic nerve injury, even death.   We are proceeding with it on April 22, 2025.

## 2025-03-05 DIAGNOSIS — Z01.818 PREOP TESTING: ICD-10-CM

## 2025-03-19 DIAGNOSIS — Z01.818 PREOP TESTING: ICD-10-CM

## 2025-03-28 DIAGNOSIS — G47.9 SLEEP DISTURBANCE: ICD-10-CM

## 2025-03-28 DIAGNOSIS — E78.00 PURE HYPERCHOLESTEROLEMIA: ICD-10-CM

## 2025-03-28 DIAGNOSIS — K21.9 GASTROESOPHAGEAL REFLUX DISEASE, UNSPECIFIED WHETHER ESOPHAGITIS PRESENT: ICD-10-CM

## 2025-03-28 DIAGNOSIS — G47.00 INSOMNIA, UNSPECIFIED TYPE: ICD-10-CM

## 2025-03-28 NOTE — TELEPHONE ENCOUNTER
Name of Medication(s) Requested:  Requested Prescriptions     Pending Prescriptions Disp Refills    traZODone (DESYREL) 50 MG tablet 90 tablet 1     Sig: Take 1 tablet by mouth nightly    rosuvastatin (CRESTOR) 40 MG tablet 90 tablet 1     Sig: Take 1 tablet by mouth daily    metoprolol succinate (TOPROL XL) 200 MG extended release tablet 90 tablet 1     Sig: Take 1 tablet by mouth daily    omeprazole (PRILOSEC) 40 MG delayed release capsule 90 capsule 1     Sig: Take 1 capsule by mouth Daily       Medication is on current medication list Yes    Dosage and directions were verified? Yes    Quantity verified: 90 day supply     Pharmacy Verified?  Yes    Last Appointment:  12/13/2024    Future appts:  Future Appointments   Date Time Provider Department Center   6/20/2025  7:30 AM Black Wise MD [facility] DEP        (If no appt send self scheduling link. .REFILLAPPT)  Scheduling request sent?     [] Yes  [x] No    Does patient need updated?  [] Yes  [] No

## 2025-03-29 RX ORDER — ROSUVASTATIN CALCIUM 40 MG/1
40 TABLET, COATED ORAL DAILY
Qty: 90 TABLET | Refills: 0 | Status: SHIPPED | OUTPATIENT
Start: 2025-03-29

## 2025-03-29 RX ORDER — METOPROLOL SUCCINATE 200 MG/1
200 TABLET, EXTENDED RELEASE ORAL DAILY
Qty: 90 TABLET | Refills: 0 | Status: SHIPPED | OUTPATIENT
Start: 2025-03-29

## 2025-03-29 RX ORDER — TRAZODONE HYDROCHLORIDE 50 MG/1
50 TABLET ORAL NIGHTLY
Qty: 90 TABLET | Refills: 0 | Status: SHIPPED | OUTPATIENT
Start: 2025-03-29

## 2025-03-29 RX ORDER — OMEPRAZOLE 40 MG/1
40 CAPSULE, DELAYED RELEASE ORAL DAILY
Qty: 90 CAPSULE | Refills: 0 | Status: SHIPPED | OUTPATIENT
Start: 2025-03-29

## 2025-04-17 ENCOUNTER — APPOINTMENT (OUTPATIENT)
Dept: CARDIOLOGY | Facility: HOSPITAL | Age: 65
End: 2025-04-17
Payer: COMMERCIAL

## 2025-05-14 DIAGNOSIS — E78.00 PURE HYPERCHOLESTEROLEMIA: ICD-10-CM

## 2025-05-14 DIAGNOSIS — K21.9 GASTROESOPHAGEAL REFLUX DISEASE, UNSPECIFIED WHETHER ESOPHAGITIS PRESENT: ICD-10-CM

## 2025-05-14 RX ORDER — OMEPRAZOLE 40 MG/1
40 CAPSULE, DELAYED RELEASE ORAL DAILY
Qty: 90 CAPSULE | Refills: 0 | Status: CANCELLED | OUTPATIENT
Start: 2025-05-14

## 2025-05-14 RX ORDER — ROSUVASTATIN CALCIUM 40 MG/1
40 TABLET, COATED ORAL DAILY
Qty: 90 TABLET | Refills: 0 | Status: CANCELLED | OUTPATIENT
Start: 2025-05-14

## 2025-05-14 NOTE — TELEPHONE ENCOUNTER
Name of Medication(s) Requested:  Requested Prescriptions     Pending Prescriptions Disp Refills    rosuvastatin (CRESTOR) 40 MG tablet 90 tablet 0     Sig: Take 1 tablet by mouth daily    omeprazole (PRILOSEC) 40 MG delayed release capsule 90 capsule 0     Sig: Take 1 capsule by mouth Daily       Medication is on current medication list Yes    Dosage and directions were verified? Yes    Quantity verified: 90 day supply     Pharmacy Verified?  Yes    Last Appointment:  12/13/2024    Future appts:  Future Appointments   Date Time Provider Department Center   6/20/2025  7:30 AM Black Wise MD [facility] DEP        (If no appt send self scheduling link. .REFILLAPPT)  Scheduling request sent?     [] Yes  [x] No    Does patient need updated?  [] Yes  [] No

## 2025-05-28 ENCOUNTER — TELEPHONE (OUTPATIENT)
Dept: SCHEDULING | Age: 65
End: 2025-05-28
Payer: COMMERCIAL

## 2025-05-29 DIAGNOSIS — E78.00 PURE HYPERCHOLESTEROLEMIA: ICD-10-CM

## 2025-05-29 DIAGNOSIS — K21.9 GASTROESOPHAGEAL REFLUX DISEASE, UNSPECIFIED WHETHER ESOPHAGITIS PRESENT: ICD-10-CM

## 2025-05-29 RX ORDER — ROSUVASTATIN CALCIUM 40 MG/1
40 TABLET, COATED ORAL DAILY
Qty: 90 TABLET | Refills: 1 | Status: SHIPPED | OUTPATIENT
Start: 2025-05-29

## 2025-05-29 RX ORDER — OMEPRAZOLE 40 MG/1
40 CAPSULE, DELAYED RELEASE ORAL DAILY
Qty: 90 CAPSULE | Refills: 1 | Status: SHIPPED | OUTPATIENT
Start: 2025-05-29

## 2025-05-29 NOTE — TELEPHONE ENCOUNTER
Name of Medication(s) Requested:  Requested Prescriptions     Pending Prescriptions Disp Refills    omeprazole (PRILOSEC) 40 MG delayed release capsule 90 capsule 1     Sig: Take 1 capsule by mouth Daily       Medication is on current medication list Yes    Dosage and directions were verified? Yes    Quantity verified: 90 day supply     Pharmacy Verified?  Yes    Last Appointment:  12/13/2024    Future appts:  Future Appointments   Date Time Provider Department Center   6/20/2025  7:30 AM Black Wise MD [facility] DEP        (If no appt send self scheduling link. .REFILLAPPT)  Scheduling request sent?     [] Yes  [x] No    Does patient need updated?  [] Yes  [x] No

## 2025-05-30 ENCOUNTER — LAB (OUTPATIENT)
Dept: LAB | Facility: HOSPITAL | Age: 65
End: 2025-05-30
Payer: MEDICARE

## 2025-05-30 LAB
ABO GROUP (TYPE) IN BLOOD: NORMAL
ANTIBODY SCREEN: NORMAL
RH FACTOR (ANTIGEN D): NORMAL

## 2025-05-30 PROCEDURE — 86850 RBC ANTIBODY SCREEN: CPT

## 2025-05-30 PROCEDURE — 36415 COLL VENOUS BLD VENIPUNCTURE: CPT

## 2025-05-30 PROCEDURE — 86900 BLOOD TYPING SEROLOGIC ABO: CPT

## 2025-05-30 PROCEDURE — 86901 BLOOD TYPING SEROLOGIC RH(D): CPT

## 2025-05-31 LAB
ALBUMIN SERPL-MCNC: 4.5 G/DL (ref 3.6–5.1)
ALP SERPL-CCNC: 53 U/L (ref 35–144)
ALT SERPL-CCNC: 19 U/L (ref 9–46)
ANION GAP SERPL CALCULATED.4IONS-SCNC: 10 MMOL/L (CALC) (ref 7–17)
ANION GAP SERPL CALCULATED.4IONS-SCNC: 8 MMOL/L (CALC) (ref 7–17)
AST SERPL-CCNC: 17 U/L (ref 10–35)
BILIRUB SERPL-MCNC: 0.8 MG/DL (ref 0.2–1.2)
BUN SERPL-MCNC: 12 MG/DL (ref 7–25)
BUN SERPL-MCNC: 12 MG/DL (ref 7–25)
BUN/CREAT SERPL: NORMAL (CALC) (ref 6–22)
CALCIUM SERPL-MCNC: 8.9 MG/DL (ref 8.6–10.3)
CALCIUM SERPL-MCNC: 9 MG/DL (ref 8.6–10.3)
CHLORIDE SERPL-SCNC: 101 MMOL/L (ref 98–110)
CHLORIDE SERPL-SCNC: 101 MMOL/L (ref 98–110)
CHOLEST SERPL-MCNC: 204 MG/DL
CHOLEST/HDLC SERPL: 3.8 (CALC)
CO2 SERPL-SCNC: 27 MMOL/L (ref 20–32)
CO2 SERPL-SCNC: 29 MMOL/L (ref 20–32)
CREAT SERPL-MCNC: 0.97 MG/DL (ref 0.7–1.35)
CREAT SERPL-MCNC: 0.99 MG/DL (ref 0.7–1.35)
EGFRCR SERPLBLD CKD-EPI 2021: 85 ML/MIN/1.73M2
EGFRCR SERPLBLD CKD-EPI 2021: 87 ML/MIN/1.73M2
ERYTHROCYTE [DISTWIDTH] IN BLOOD BY AUTOMATED COUNT: 13.4 % (ref 11–15)
GLUCOSE SERPL-MCNC: 90 MG/DL (ref 65–99)
GLUCOSE SERPL-MCNC: 91 MG/DL (ref 65–99)
HCT VFR BLD AUTO: 44.1 % (ref 38.5–50)
HDLC SERPL-MCNC: 53 MG/DL
HGB BLD-MCNC: 14 G/DL (ref 13.2–17.1)
LDLC SERPL CALC-MCNC: 132 MG/DL (CALC)
MCH RBC QN AUTO: 28.5 PG (ref 27–33)
MCHC RBC AUTO-ENTMCNC: 31.7 G/DL (ref 32–36)
MCV RBC AUTO: 89.6 FL (ref 80–100)
NONHDLC SERPL-MCNC: 151 MG/DL (CALC)
PLATELET # BLD AUTO: 187 THOUSAND/UL (ref 140–400)
PMV BLD REES-ECKER: 10.7 FL (ref 7.5–12.5)
POTASSIUM SERPL-SCNC: 4.1 MMOL/L (ref 3.5–5.3)
POTASSIUM SERPL-SCNC: 4.2 MMOL/L (ref 3.5–5.3)
PROT SERPL-MCNC: 6.7 G/DL (ref 6.1–8.1)
RBC # BLD AUTO: 4.92 MILLION/UL (ref 4.2–5.8)
SODIUM SERPL-SCNC: 138 MMOL/L (ref 135–146)
SODIUM SERPL-SCNC: 138 MMOL/L (ref 135–146)
TRIGL SERPL-MCNC: 87 MG/DL
WBC # BLD AUTO: 7.4 THOUSAND/UL (ref 3.8–10.8)

## 2025-06-02 ENCOUNTER — OFFICE VISIT (OUTPATIENT)
Dept: CARDIOLOGY | Facility: HOSPITAL | Age: 65
End: 2025-06-02
Payer: MEDICARE

## 2025-06-02 VITALS
DIASTOLIC BLOOD PRESSURE: 74 MMHG | BODY MASS INDEX: 26.82 KG/M2 | HEART RATE: 74 BPM | SYSTOLIC BLOOD PRESSURE: 140 MMHG | HEIGHT: 72 IN | OXYGEN SATURATION: 100 % | WEIGHT: 198 LBS

## 2025-06-02 DIAGNOSIS — R03.0 ELEVATED BP WITHOUT DIAGNOSIS OF HYPERTENSION: ICD-10-CM

## 2025-06-02 DIAGNOSIS — I20.89 ANGINA OF EFFORT: ICD-10-CM

## 2025-06-02 DIAGNOSIS — E78.01 FAMILIAL HYPERCHOLESTEROLEMIA: ICD-10-CM

## 2025-06-02 DIAGNOSIS — Z79.01 CHRONIC ANTICOAGULATION: ICD-10-CM

## 2025-06-02 DIAGNOSIS — I48.21 PERMANENT ATRIAL FIBRILLATION (MULTI): Primary | ICD-10-CM

## 2025-06-02 LAB
ATRIAL RATE: 71 BPM
Q ONSET: 216 MS
QRS COUNT: 12 BEATS
QRS DURATION: 102 MS
QT INTERVAL: 408 MS
QTC CALCULATION(BAZETT): 452 MS
QTC FREDERICIA: 437 MS
R AXIS: 38 DEGREES
T AXIS: 38 DEGREES
T OFFSET: 420 MS
VENTRICULAR RATE: 74 BPM

## 2025-06-02 PROCEDURE — G2211 COMPLEX E/M VISIT ADD ON: HCPCS | Performed by: INTERNAL MEDICINE

## 2025-06-02 PROCEDURE — 3008F BODY MASS INDEX DOCD: CPT | Performed by: INTERNAL MEDICINE

## 2025-06-02 PROCEDURE — 99214 OFFICE O/P EST MOD 30 MIN: CPT | Performed by: INTERNAL MEDICINE

## 2025-06-02 PROCEDURE — 1036F TOBACCO NON-USER: CPT | Performed by: INTERNAL MEDICINE

## 2025-06-02 PROCEDURE — 1160F RVW MEDS BY RX/DR IN RCRD: CPT | Performed by: INTERNAL MEDICINE

## 2025-06-02 PROCEDURE — 1159F MED LIST DOCD IN RCRD: CPT | Performed by: INTERNAL MEDICINE

## 2025-06-02 PROCEDURE — 93005 ELECTROCARDIOGRAM TRACING: CPT | Performed by: INTERNAL MEDICINE

## 2025-06-02 PROCEDURE — 99212 OFFICE O/P EST SF 10 MIN: CPT | Performed by: INTERNAL MEDICINE

## 2025-06-02 NOTE — H&P (VIEW-ONLY)
"Primary Care Physician: Cordell Smith MD      Date of Visit: 06/02/2025 11:20 AM EDT  Location of visit: [facility]   Type of Visit: Established Patient     HPI / Summary:   Eleno Kenney is a very pleasant 65 y.o. male initially presented for management of hyperlipidemia and permanent Atrial Fibrillation who now returns for follow-up after Afib recurred  following DAYANARA/DCC on 12/6/2024 and is currently awaiting Afib ablation.      He has a history of permanent Afib dating back to 2011, when asymptomatic but wife hear his HR being irregular. Reportedly had an echo at that time and a stress echo which were \" unremarkable.\" Pt was left in atrial fibrillation since that time without any prior attempts at DCC.   CAD risk factors: + hyperlipidemia(  on rosuvastatin 40 mg daily? FH) , no HTN, no DM though prediabetic for years, no Fhx of CAD, no Fhx of Afib. Nonsmoker. Denies snoring or h/o LA. Drinks rare ETOH and has 3-4 cups fo coffee daily.      Outside work up :  Stress test 11/2021: no CP with exercise and EKG was negative. LVEF was normal without regional wall motion abnormalities . Nunez treadmill score was 6. Exercise capacity was average.   Event monitor 10/6/2022-10/19/2022: Afib 100% of the time. HR ranged from 50 bpm-191 bpm Avg 100 bpm. Isolated VE s frequent( 10.5%) VE couplets were rare. VE triplets were rare ( < 1%)   Echo 10/2022:  LVEF 55-60%, o valvular abnormalities, normal LV size, mild biatrial enlargement. Afib.      Had seen EP  at [facility] who planned on  anticoagulation with eliquis followed by hospitalization for anti-arrhythmic rx and DCC in Jan 2023, however patient decide at that time not to be admitted to the hospital. No further rx was done. Patient has CHADsvasc score of 0 is not HTN, though BP has been high in the office. No prior h/o HTN. Patient previously was on not anticoagulation. He  was rate controlled with metoprolol      Eliquis was started 10/2024, though " "was initially missing some doses and was taking twice daily rather than every 12 hours   Pt underwent DAYANARA /DCC on 12/6/2024. Following cardioversion patient did feel better. By visit on 1/17/2025, pt was back in atrial fibrillation. Cardiac monitor was done in January 2025 and pt was in > 99% Afib, and was referred for Afib ablation with Dr RAMONA Hernandez and is scheduled for ablation this Friday.   Pt denies dizziness presyncope or syncope. He has noted over the pat 2 weeks that he has CP/chest tightness on exertion if he is rushing to go somewhere ( ie if running or going fast across a parking lot. If her rests the CP resolves in a few minutes. NO CP or SOB at rest. No prolonged episodes. Minimal bleeding from eliquis ( minimal hemorrhoidal bleeding ).     ROS: Full 10 pt review of symptoms of negative unless discussed above.     Problems:   Problem List[1]  Medical History:   Medical History[2]  Surgical Hx:   Surgical History[3]   Social Hx:   Tobacco Use: Low Risk  (6/2/2025)    Patient History     Smoking Tobacco Use: Never     Smokeless Tobacco Use: Never     Passive Exposure: Not on file     Alcohol Use: Not At Risk (11/1/2022)    Received from [facility]    AUDIT-C     Frequency of Alcohol Consumption: Never     Average Number of Drinks: Patient does not drink     Frequency of Binge Drinking: Never     Family Hx:   Family History[4]   Exam:   Vitals:   Vitals:    06/02/25 1137   BP: 140/74   Pulse: 74   SpO2: 100%   Weight: 89.8 kg (198 lb)   Height: 1.816 m (5' 11.5\")     Wt Readings from Last 5 Encounters:   06/02/25 89.8 kg (198 lb)   02/14/25 88.5 kg (195 lb)   01/17/25 90.1 kg (198 lb 9.6 oz)   12/06/24 88 kg (194 lb)   11/22/24 90.3 kg (199 lb)      Constitutional:       Appearance: Healthy appearance. Not in distress.   Pulmonary:      Effort: Pulmonary effort is normal.      Breath sounds: Normal breath sounds.   Cardiovascular:      PMI at left midclavicular line. Normal rate. " Irregularly irregular rhythm. S1 with normal intensity. S2 with normal intensity.       Murmurs: There is no murmur.   Pulses:     Intact distal pulses.   Edema:     Peripheral edema absent.   Neurological:      Mental Status: Alert and oriented to person, place, and time.       Labs:   Recent Labs     05/30/25  1317 11/22/24  0924   WBC 7.4 6.3   HGB 14.0 14.9   HCT 44.1 46.2    181   MCV 89.6 88     Recent Labs     05/30/25  1319 05/30/25  1317 11/22/24  0924    138 139   K 4.1 4.2 4.5    101 103   BUN 12 12 13   CREATININE 0.97 0.99 0.89      Recent Labs     10/25/22  0530   HGBA1C 6.2*     Lab Results   Component Value Date    CHOL 204 (H) 05/30/2025    HDL 53 05/30/2025    LDLCALC 132 (H) 05/30/2025    TRIG 87 05/30/2025     Notable Studies: imaging personally reviewed and summarized by me below  EKG:  Encounter Date: 06/02/25   ECG 12 lead (Clinic Performed)   Result Value    Ventricular Rate 74    Atrial Rate 71    QRS Duration 102    QT Interval 408    QTC Calculation(Bazett) 452    R Axis 38    T Axis 38    QRS Count 12    Q Onset 216    T Offset 420    QTC Fredericia 437    Narrative    Atrial fibrillation  Septal infarct (cited on or before 14-FEB-2025)  Abnormal ECG  When compared with ECG of 14-FEB-2025 13:31, (unconfirmed)  T wave inversion no longer evident in Inferior leads  Nonspecific T wave abnormality has replaced inverted T waves in Lateral leads     DAYANARA 12/6/2024:  PHYSICIAN INTERPRETATION:  DAYANARA Details: Technically adequate omniplane transesophageal echocardiogram performed. Agitated saline contrast echo was performed to assess for the presence of a patent foramen ovale.  DAYANARA Medication: The patient was sedated by Anesthesia; please refer to anesthesia flow sheet for medications used.  DAYANARA Procedure: The probe was passed without difficulty. Complications encountered during procedure: Patient tolerated the procedure well without any apparent complications.  Left Ventricle:  Left ventricular ejection fraction is normal, by visual estimate at 60-65%. The left ventricular cavity size was not assessed. Left ventricular diastolic filling was not assessed.  Left Atrium: The left atrium is normal in size. There is a small patent foramen ovale with predominantly left to right shunting across the atrial septum. A patent foramen ovale was visualized using color Doppler. A bubble study using agitated saline was performed. Bubble study is negative. There is no thrombus visualized in the left atrial appendage.  Right Ventricle: The right ventricle is normal in size. There is normal right ventricular global systolic function.  Right Atrium: The right atrium is normal in size.  Aortic Valve: The aortic valve is trileaflet. There is no evidence of aortic valve regurgitation.  Mitral Valve: The mitral valve is normal in structure. There is trace mitral valve regurgitation.  Tricuspid Valve: The tricuspid valve is structurally normal. There is trace tricuspid regurgitation.  Pulmonic Valve: The pulmonic valve is structurally normal. There is trace pulmonic valve regurgitation.  Pericardium: There is no pericardial effusion noted.  Aorta: The aortic root is abnormal. The aortic root appears normal in size and measures 3.70 cm. No significant aortic plaque is visualized.  Pulmonary Artery: The pulmonary artery is not well visualized.  Systemic Veins: The inferior vena cava was not well visualized.  In comparison to the previous echocardiogram(s): Compared with study dated 11/4/2024, This study demonstrated a PFO through color Doppler with left to right shunt.        CONCLUSIONS:   1. Left ventricular ejection fraction is normal, by visual estimate at 60-65%.   2. There is normal right ventricular global systolic function.   3. Normal aortic root.   4. The pulmonary artery is not well visualized.   5. Small patent foramen ovale.   6. Compared with study dated 11/4/2024, This study demonstrated a PFO  through color Doppler with left to right shunt.        11/4/2024  Transthoracic echo  PHYSICIAN INTERPRETATION:  Left Ventricle: Left ventricular ejection fraction is normal, calculated by Churchill's biplane at 63%. The patient is in atrial fibrillation which may influence the estimate of left ventricular function and transvalvular flows. There are no regional left ventricular wall motion abnormalities. The left ventricular cavity size is normal. There is normal septal and normal posterior left ventricular wall thickness. Left ventricular diastolic filling cannot be determined, due to atrial fibrillation/flutter.  Left Atrium: The left atrium is mildly dilated.  Right Ventricle: The right ventricle is normal in size. There is normal right ventricular global systolic function.  Right Atrium: The right atrium is mildly dilated.  Aortic Valve: The aortic valve is trileaflet. The aortic valve dimensionless index is 0.83. There is no evidence of aortic valve regurgitation. The peak instantaneous gradient of the aortic valve is 3 mmHg. The mean gradient of the mitral valve is 2 mmHg.  Mitral Valve: The mitral valve is normal in structure. There is trace mitral valve regurgitation.  Tricuspid Valve: The tricuspid valve is structurally normal. There is trace tricuspid regurgitation. The Doppler estimated RVSP is within normal limits at 29.0 mmHg. Reported right ventricular systolic pressure may be underestimated due to incomplete or suboptimal Doppler envelope.  Pulmonic Valve: The pulmonic valve is structurally normal. There is physiologic pulmonic valve regurgitation.  Pericardium: There is no pericardial effusion noted.  Aorta: The aortic root is abnormal. The Ao Sinus is 3.70 cm. The Asc Ao is 3.20 cm. There is mild dilatation of the aortic root.  Systemic Veins: The inferior vena cava appears normal in size, with IVC inspiratory collapse greater than 50%.  In comparison to the previous echocardiogram(s): There are no  prior studies on this patient for comparison purposes. No prior echocardiogram available for comparison.        CONCLUSIONS:   1. Left ventricular ejection fraction is normal, calculated by Churchill's biplane at 63%.   2. Left ventricular diastolic filling cannot be determined, due to atrial fibrillation/flutter.   3. There is normal right ventricular global systolic function.   4. The left atrium is mildly dilated.   5. Right ventricular systolic pressure is within normal limits.   6. No prior echocardiogram available for comparison.   7. The patient is in atrial fibrillation which may influence the estimate of left ventricular function and transvalvular flows.            Current Outpatient Medications   Medication Instructions    apixaban (ELIQUIS) 5 mg, oral, 2 times daily    melatonin 5 mg tablet,chewable 1 tablet, Daily PRN    metoprolol succinate XL (Toprol-XL) 200 mg 24 hr tablet 1 tablet, Daily    omeprazole (PriLOSEC) 40 mg DR capsule 1 capsule, Daily    psyllium (Metamucil) 3.4 gram packet 1 packet, Daily    rosuvastatin (Crestor) 40 mg tablet 1 tablet, Daily    traZODone (Desyrel) 50 mg tablet 1 tablet, Nightly     Impressions and Plan:    PT is a 65 year old man with hyperlipidemia ( FH levels) with permanent Afib sicne 2011 though with a mildly dilated LA who underwent DAYANARA DCC 12/6/2024 which was initially successful and possibly lasted in NSR for 3 days, but is back in Afib. He felt better when he was in sinus rhythm. He is chronically anticoagulated with eliquis 5 mg bid and is scheduled for  Afib ablation. This Friday.  He is currently on max dose of rosuvastatin his LDL is still high. Was  awaiting CT for CAC to set LDL target, however more recently started to have anginal sx if rushing across parking lots etc over past 2 weeks. NO rest pain and no prolonged episodes. Will get AF ablation done and will assess coronaries with cCTA with heart flow.Will likely need additional agent for lipid management  ( likely PCSK9i) . Although BP was elevated at prior office visit, BP was normal at home previously though is not checking it recently. High today in the office. Will have patient resume checking BP at home.   Plan  Afib- recurred after DCC. Continues on eliquis 5 mg bid and rate controlled with metoprolol succinate 200 mg daily. Upcoming AFIb ablation by  Dr Hernandez  Prior Elevated BP- was reasonable Bp at home previously  but not checking. Mildly elevated again in office today. Home BP and HR checks and log for review.   Hyperlipidemia ( FH) - continue rosuvastatin 40 mg dialy. Was awaiting CT for CAC to set LDL target., however given new onset exertional CP, will pursue cCTA with heart flow.  Will consider addition of PCSK9i vs Inclisiran.   Chronic anticoagulation- elioquis 5 mg bid.   New onset CP on exertion- cCTA with heart flow. (To be done at [facility])   Virtual visit July 28, follow up visit after that in 5 months.      Patient Instructions:  If you have any questions or need cardiac medication refills, please call my office at [phone],      To reach my office please call [phone]  To schedule an appointment call [phone].          ____________________________________________________________  Kenzie Aviles MD  Division of Cardiovascular Medicine  [facility]  [facility]        [1]   Patient Active Problem List  Diagnosis    Permanent atrial fibrillation (Multi)    Familial hypercholesterolemia    Acute diverticulitis    Adenomatous polyp of colon    Anxiety    Chronic left shoulder pain    Diverticulosis    Elevated cholesterol/high density lipoprotein ratio    GERD (gastroesophageal reflux disease)    History of colon polyps    Hyperlipidemia    Hypogonadism male    Sleep disturbance    Tubular adenoma   [2] History reviewed. No pertinent past medical history.  [3]   Past Surgical History:  Procedure Laterality Date    CARDIAC ELECTROPHYSIOLOGY  PROCEDURE N/A 12/6/2024    Procedure: Cardioversion;  Surgeon: Shine Hernandez MD;  Location: [place] [address] [facility]iac [facility];  Service: Electrophysiology;  Laterality: N/A;  Needs DAYANARA prior to cardioversion   [4] No family history on file.

## 2025-06-02 NOTE — PATIENT INSTRUCTIONS
Atrial fibrillation- s/p DAYANARA/DCC in December, now back in atrial fibrillation- Continue eliquis 5 mg every 12 hours and metoprolol succinate 200 mg daily for rate control. To have Afib ablation by Dr Hernandez this Friday.  Familial hypercholesterolemia- continue rosuvastatin 40 mg daily for now.  LDL is 132 on max dose of rosuvastatin. Will likely need additional lipid lowering meds ( possibly PCSK9i) but will use coronary artery calcium score/cCTA with heart flow to set LDL target.   Patient now with exertional chest tightness over past couple of weeks. To assess with coronary artery CTA with heart flow to be done at [facility].   Chronic anticoagulation with eliquis.   BP borderline in the office - please start checking BP and heart rate at home and log for review to see if medications need adjustment.   Virtual visit to be set up for July 28 2025 and in person follow up in 5 months

## 2025-06-02 NOTE — PROGRESS NOTES
"Primary Care Physician: Cordell Smith MD      Date of Visit: 06/02/2025 11:20 AM EDT  Location of visit: [facility]   Type of Visit: Established Patient     HPI / Summary:   Eleno Kenney is a very pleasant 65 y.o. male initially presented for management of hyperlipidemia and permanent Atrial Fibrillation who now returns for follow-up after Afib recurred  following DAYANARA/DCC on 12/6/2024 and is currently awaiting Afib ablation.      He has a history of permanent Afib dating back to 2011, when asymptomatic but wife hear his HR being irregular. Reportedly had an echo at that time and a stress echo which were \" unremarkable.\" Pt was left in atrial fibrillation since that time without any prior attempts at DCC.   CAD risk factors: + hyperlipidemia(  on rosuvastatin 40 mg daily? FH) , no HTN, no DM though prediabetic for years, no Fhx of CAD, no Fhx of Afib. Nonsmoker. Denies snoring or h/o LA. Drinks rare ETOH and has 3-4 cups fo coffee daily.      Outside work up :  Stress test 11/2021: no CP with exercise and EKG was negative. LVEF was normal without regional wall motion abnormalities . Nunez treadmill score was 6. Exercise capacity was average.   Event monitor 10/6/2022-10/19/2022: Afib 100% of the time. HR ranged from 50 bpm-191 bpm Avg 100 bpm. Isolated VE s frequent( 10.5%) VE couplets were rare. VE triplets were rare ( < 1%)   Echo 10/2022:  LVEF 55-60%, o valvular abnormalities, normal LV size, mild biatrial enlargement. Afib.      Had seen EP  at [facility] who planned on  anticoagulation with eliquis followed by hospitalization for anti-arrhythmic rx and DCC in Jan 2023, however patient decide at that time not to be admitted to the hospital. No further rx was done. Patient has CHADsvasc score of 0 is not HTN, though BP has been high in the office. No prior h/o HTN. Patient previously was on not anticoagulation. He  was rate controlled with metoprolol      Eliquis was started 10/2024, though " "was initially missing some doses and was taking twice daily rather than every 12 hours   Pt underwent DAYANARA /DCC on 12/6/2024. Following cardioversion patient did feel better. By visit on 1/17/2025, pt was back in atrial fibrillation. Cardiac monitor was done in January 2025 and pt was in > 99% Afib, and was referred for Afib ablation with Dr RAMONA Hernandez and is scheduled for ablation this Friday.   Pt denies dizziness presyncope or syncope. He has noted over the pat 2 weeks that he has CP/chest tightness on exertion if he is rushing to go somewhere ( ie if running or going fast across a parking lot. If her rests the CP resolves in a few minutes. NO CP or SOB at rest. No prolonged episodes. Minimal bleeding from eliquis ( minimal hemorrhoidal bleeding ).     ROS: Full 10 pt review of symptoms of negative unless discussed above.     Problems:   Problem List[1]  Medical History:   Medical History[2]  Surgical Hx:   Surgical History[3]   Social Hx:   Tobacco Use: Low Risk  (6/2/2025)    Patient History     Smoking Tobacco Use: Never     Smokeless Tobacco Use: Never     Passive Exposure: Not on file     Alcohol Use: Not At Risk (11/1/2022)    Received from [facility]    AUDIT-C     Frequency of Alcohol Consumption: Never     Average Number of Drinks: Patient does not drink     Frequency of Binge Drinking: Never     Family Hx:   Family History[4]   Exam:   Vitals:   Vitals:    06/02/25 1137   BP: 140/74   Pulse: 74   SpO2: 100%   Weight: 89.8 kg (198 lb)   Height: 1.816 m (5' 11.5\")     Wt Readings from Last 5 Encounters:   06/02/25 89.8 kg (198 lb)   02/14/25 88.5 kg (195 lb)   01/17/25 90.1 kg (198 lb 9.6 oz)   12/06/24 88 kg (194 lb)   11/22/24 90.3 kg (199 lb)      Constitutional:       Appearance: Healthy appearance. Not in distress.   Pulmonary:      Effort: Pulmonary effort is normal.      Breath sounds: Normal breath sounds.   Cardiovascular:      PMI at left midclavicular line. Normal rate. " Irregularly irregular rhythm. S1 with normal intensity. S2 with normal intensity.       Murmurs: There is no murmur.   Pulses:     Intact distal pulses.   Edema:     Peripheral edema absent.   Neurological:      Mental Status: Alert and oriented to person, place, and time.       Labs:   Recent Labs     05/30/25  1317 11/22/24  0924   WBC 7.4 6.3   HGB 14.0 14.9   HCT 44.1 46.2    181   MCV 89.6 88     Recent Labs     05/30/25  1319 05/30/25  1317 11/22/24  0924    138 139   K 4.1 4.2 4.5    101 103   BUN 12 12 13   CREATININE 0.97 0.99 0.89      Recent Labs     10/25/22  0530   HGBA1C 6.2*     Lab Results   Component Value Date    CHOL 204 (H) 05/30/2025    HDL 53 05/30/2025    LDLCALC 132 (H) 05/30/2025    TRIG 87 05/30/2025     Notable Studies: imaging personally reviewed and summarized by me below  EKG:  Encounter Date: 06/02/25   ECG 12 lead (Clinic Performed)   Result Value    Ventricular Rate 74    Atrial Rate 71    QRS Duration 102    QT Interval 408    QTC Calculation(Bazett) 452    R Axis 38    T Axis 38    QRS Count 12    Q Onset 216    T Offset 420    QTC Fredericia 437    Narrative    Atrial fibrillation  Septal infarct (cited on or before 14-FEB-2025)  Abnormal ECG  When compared with ECG of 14-FEB-2025 13:31, (unconfirmed)  T wave inversion no longer evident in Inferior leads  Nonspecific T wave abnormality has replaced inverted T waves in Lateral leads     DAYANARA 12/6/2024:  PHYSICIAN INTERPRETATION:  DAYANARA Details: Technically adequate omniplane transesophageal echocardiogram performed. Agitated saline contrast echo was performed to assess for the presence of a patent foramen ovale.  DAYANARA Medication: The patient was sedated by Anesthesia; please refer to anesthesia flow sheet for medications used.  DAYANARA Procedure: The probe was passed without difficulty. Complications encountered during procedure: Patient tolerated the procedure well without any apparent complications.  Left Ventricle:  Left ventricular ejection fraction is normal, by visual estimate at 60-65%. The left ventricular cavity size was not assessed. Left ventricular diastolic filling was not assessed.  Left Atrium: The left atrium is normal in size. There is a small patent foramen ovale with predominantly left to right shunting across the atrial septum. A patent foramen ovale was visualized using color Doppler. A bubble study using agitated saline was performed. Bubble study is negative. There is no thrombus visualized in the left atrial appendage.  Right Ventricle: The right ventricle is normal in size. There is normal right ventricular global systolic function.  Right Atrium: The right atrium is normal in size.  Aortic Valve: The aortic valve is trileaflet. There is no evidence of aortic valve regurgitation.  Mitral Valve: The mitral valve is normal in structure. There is trace mitral valve regurgitation.  Tricuspid Valve: The tricuspid valve is structurally normal. There is trace tricuspid regurgitation.  Pulmonic Valve: The pulmonic valve is structurally normal. There is trace pulmonic valve regurgitation.  Pericardium: There is no pericardial effusion noted.  Aorta: The aortic root is abnormal. The aortic root appears normal in size and measures 3.70 cm. No significant aortic plaque is visualized.  Pulmonary Artery: The pulmonary artery is not well visualized.  Systemic Veins: The inferior vena cava was not well visualized.  In comparison to the previous echocardiogram(s): Compared with study dated 11/4/2024, This study demonstrated a PFO through color Doppler with left to right shunt.        CONCLUSIONS:   1. Left ventricular ejection fraction is normal, by visual estimate at 60-65%.   2. There is normal right ventricular global systolic function.   3. Normal aortic root.   4. The pulmonary artery is not well visualized.   5. Small patent foramen ovale.   6. Compared with study dated 11/4/2024, This study demonstrated a PFO  through color Doppler with left to right shunt.        11/4/2024  Transthoracic echo  PHYSICIAN INTERPRETATION:  Left Ventricle: Left ventricular ejection fraction is normal, calculated by Churchill's biplane at 63%. The patient is in atrial fibrillation which may influence the estimate of left ventricular function and transvalvular flows. There are no regional left ventricular wall motion abnormalities. The left ventricular cavity size is normal. There is normal septal and normal posterior left ventricular wall thickness. Left ventricular diastolic filling cannot be determined, due to atrial fibrillation/flutter.  Left Atrium: The left atrium is mildly dilated.  Right Ventricle: The right ventricle is normal in size. There is normal right ventricular global systolic function.  Right Atrium: The right atrium is mildly dilated.  Aortic Valve: The aortic valve is trileaflet. The aortic valve dimensionless index is 0.83. There is no evidence of aortic valve regurgitation. The peak instantaneous gradient of the aortic valve is 3 mmHg. The mean gradient of the mitral valve is 2 mmHg.  Mitral Valve: The mitral valve is normal in structure. There is trace mitral valve regurgitation.  Tricuspid Valve: The tricuspid valve is structurally normal. There is trace tricuspid regurgitation. The Doppler estimated RVSP is within normal limits at 29.0 mmHg. Reported right ventricular systolic pressure may be underestimated due to incomplete or suboptimal Doppler envelope.  Pulmonic Valve: The pulmonic valve is structurally normal. There is physiologic pulmonic valve regurgitation.  Pericardium: There is no pericardial effusion noted.  Aorta: The aortic root is abnormal. The Ao Sinus is 3.70 cm. The Asc Ao is 3.20 cm. There is mild dilatation of the aortic root.  Systemic Veins: The inferior vena cava appears normal in size, with IVC inspiratory collapse greater than 50%.  In comparison to the previous echocardiogram(s): There are no  prior studies on this patient for comparison purposes. No prior echocardiogram available for comparison.        CONCLUSIONS:   1. Left ventricular ejection fraction is normal, calculated by Churchill's biplane at 63%.   2. Left ventricular diastolic filling cannot be determined, due to atrial fibrillation/flutter.   3. There is normal right ventricular global systolic function.   4. The left atrium is mildly dilated.   5. Right ventricular systolic pressure is within normal limits.   6. No prior echocardiogram available for comparison.   7. The patient is in atrial fibrillation which may influence the estimate of left ventricular function and transvalvular flows.            Current Outpatient Medications   Medication Instructions    apixaban (ELIQUIS) 5 mg, oral, 2 times daily    melatonin 5 mg tablet,chewable 1 tablet, Daily PRN    metoprolol succinate XL (Toprol-XL) 200 mg 24 hr tablet 1 tablet, Daily    omeprazole (PriLOSEC) 40 mg DR capsule 1 capsule, Daily    psyllium (Metamucil) 3.4 gram packet 1 packet, Daily    rosuvastatin (Crestor) 40 mg tablet 1 tablet, Daily    traZODone (Desyrel) 50 mg tablet 1 tablet, Nightly     Impressions and Plan:    PT is a 65 year old man with hyperlipidemia ( FH levels) with permanent Afib sicne 2011 though with a mildly dilated LA who underwent DAYANARA DCC 12/6/2024 which was initially successful and possibly lasted in NSR for 3 days, but is back in Afib. He felt better when he was in sinus rhythm. He is chronically anticoagulated with eliquis 5 mg bid and is scheduled for  Afib ablation. This Friday.  He is currently on max dose of rosuvastatin his LDL is still high. Was  awaiting CT for CAC to set LDL target, however more recently started to have anginal sx if rushing across parking lots etc over past 2 weeks. NO rest pain and no prolonged episodes. Will get AF ablation done and will assess coronaries with cCTA with heart flow.Will likely need additional agent for lipid management  ( likely PCSK9i) . Although BP was elevated at prior office visit, BP was normal at home previously though is not checking it recently. High today in the office. Will have patient resume checking BP at home.   Plan  Afib- recurred after DCC. Continues on eliquis 5 mg bid and rate controlled with metoprolol succinate 200 mg daily. Upcoming AFIb ablation by  Dr Hernandez  Prior Elevated BP- was reasonable Bp at home previously  but not checking. Mildly elevated again in office today. Home BP and HR checks and log for review.   Hyperlipidemia ( FH) - continue rosuvastatin 40 mg dialy. Was awaiting CT for CAC to set LDL target., however given new onset exertional CP, will pursue cCTA with heart flow.  Will consider addition of PCSK9i vs Inclisiran.   Chronic anticoagulation- elioquis 5 mg bid.   New onset CP on exertion- cCTA with heart flow. (To be done [facility])   Virtual visit July 28, follow up visit after that in 5 months.      Patient Instructions:  If you have any questions or need cardiac medication refills, please call my office at [phone],      To reach my office please call [phone]  To schedule an appointment call [phone].          ____________________________________________________________  Kenzie Aviles MD  Division of Cardiovascular Medicine  [facility]  [facility]        [1]   Patient Active Problem List  Diagnosis    Permanent atrial fibrillation (Multi)    Familial hypercholesterolemia    Acute diverticulitis    Adenomatous polyp of colon    Anxiety    Chronic left shoulder pain    Diverticulosis    Elevated cholesterol/high density lipoprotein ratio    GERD (gastroesophageal reflux disease)    History of colon polyps    Hyperlipidemia    Hypogonadism male    Sleep disturbance    Tubular adenoma   [2] History reviewed. No pertinent past medical history.  [3]   Past Surgical History:  Procedure Laterality Date    CARDIAC ELECTROPHYSIOLOGY  PROCEDURE N/A 12/6/2024    Procedure: Cardioversion;  Surgeon: Shine Hernandez MD;  Location: [place] [id number] Cardiac Cath Lab;  Service: Electrophysiology;  Laterality: N/A;  Needs DAYANARA prior to cardioversion   [4] No family history on file.

## 2025-06-05 ENCOUNTER — ANESTHESIA EVENT (OUTPATIENT)
Dept: CARDIOLOGY | Facility: HOSPITAL | Age: 65
End: 2025-06-05
Payer: MEDICARE

## 2025-06-05 NOTE — HOSPITAL COURSE
Cards: Kenzie Aviles    66yo M with HLD, Anxiety, GERD, Hypogonadism, tubular Adenoma, and longstanding persistent AF. Was to get CACS (not done); more recently, Dr Aviles ordered cCTA for exertional CP (not yet done).     Cardiac Hx  Persistent AF - (index dx 2011) per records, Pt was left in AF since that time without any prior attempts at DCCV.  Had seen EP at [facility] who planned on anticoagulation with eliquis followed by hospitalization for anti-arrhythmic rx and DCCV 1/2023, however pt decide at that time not to be admitted to the hospital.      Dec 6, 2024:  s/p DCCV done by me  *DAYANARA 12/2024: EF 60-65%.  Small PFO noted.   *Echo 11/2024 EF 63% (in AF), no WMA, NL RV fxn, mild NURA, no sig valve dz, RVSP 29mm, mild dilation of Ao root   *Echo 10/2022 ([facility]): EF 55-60%, No valvular abnormalities, normal LV size, mild NURA  *MONITOR [employer] 14d 1/2025: AF (>99% burden) and PVCs (10% burden). HR , Avg HR 76 bpm  *Exer nuc stress 11/2021 ([facility]): 101% MPHR, 7 METS, NL perfusion, NL EF

## 2025-06-06 ENCOUNTER — PHARMACY VISIT (OUTPATIENT)
Dept: PHARMACY | Facility: CLINIC | Age: 65
End: 2025-06-06
Payer: COMMERCIAL

## 2025-06-06 ENCOUNTER — HOSPITAL ENCOUNTER (OUTPATIENT)
Facility: HOSPITAL | Age: 65
Setting detail: OUTPATIENT SURGERY
Discharge: HOME | End: 2025-06-06
Attending: INTERNAL MEDICINE | Admitting: INTERNAL MEDICINE
Payer: MEDICARE

## 2025-06-06 ENCOUNTER — ANESTHESIA (OUTPATIENT)
Dept: CARDIOLOGY | Facility: HOSPITAL | Age: 65
End: 2025-06-06
Payer: MEDICARE

## 2025-06-06 DIAGNOSIS — I48.19 PERSISTENT ATRIAL FIBRILLATION (MULTI): ICD-10-CM

## 2025-06-06 LAB
ABO GROUP (TYPE) IN BLOOD: NORMAL
ABO GROUP (TYPE) IN BLOOD: NORMAL
ACT BLD: 362 SEC (ref 82–174)
ACT BLD: 372 SEC (ref 82–174)
ACT BLD: 395 SEC (ref 82–174)
ACT BLD: 430 SEC (ref 82–174)
ACT BLD: 456 SEC (ref 82–174)
ANTIBODY SCREEN: NORMAL
RH FACTOR (ANTIGEN D): NORMAL
RH FACTOR (ANTIGEN D): NORMAL

## 2025-06-06 PROCEDURE — 7100000010 HC PHASE TWO TIME - EACH INCREMENTAL 1 MINUTE: Performed by: INTERNAL MEDICINE

## 2025-06-06 PROCEDURE — 2500000004 HC RX 250 GENERAL PHARMACY W/ HCPCS (ALT 636 FOR OP/ED): Performed by: ANESTHESIOLOGIST ASSISTANT

## 2025-06-06 PROCEDURE — 3700000002 HC GENERAL ANESTHESIA TIME - EACH INCREMENTAL 1 MINUTE: Performed by: INTERNAL MEDICINE

## 2025-06-06 PROCEDURE — RXMED WILLOW AMBULATORY MEDICATION CHARGE

## 2025-06-06 PROCEDURE — 86901 BLOOD TYPING SEROLOGIC RH(D): CPT | Performed by: INTERNAL MEDICINE

## 2025-06-06 PROCEDURE — 7100000001 HC RECOVERY ROOM TIME - INITIAL BASE CHARGE: Performed by: INTERNAL MEDICINE

## 2025-06-06 PROCEDURE — 3700000001 HC GENERAL ANESTHESIA TIME - INITIAL BASE CHARGE: Performed by: INTERNAL MEDICINE

## 2025-06-06 PROCEDURE — 7100000009 HC PHASE TWO TIME - INITIAL BASE CHARGE: Performed by: INTERNAL MEDICINE

## 2025-06-06 PROCEDURE — C1760 CLOSURE DEV, VASC: HCPCS | Performed by: INTERNAL MEDICINE

## 2025-06-06 PROCEDURE — 36415 COLL VENOUS BLD VENIPUNCTURE: CPT | Performed by: INTERNAL MEDICINE

## 2025-06-06 PROCEDURE — 85347 COAGULATION TIME ACTIVATED: CPT | Performed by: INTERNAL MEDICINE

## 2025-06-06 PROCEDURE — 2500000004 HC RX 250 GENERAL PHARMACY W/ HCPCS (ALT 636 FOR OP/ED): Performed by: STUDENT IN AN ORGANIZED HEALTH CARE EDUCATION/TRAINING PROGRAM

## 2025-06-06 PROCEDURE — P9047 ALBUMIN (HUMAN), 25%, 50ML: HCPCS | Performed by: ANESTHESIOLOGIST ASSISTANT

## 2025-06-06 PROCEDURE — 2780000003 HC OR 278 NO HCPCS: Performed by: INTERNAL MEDICINE

## 2025-06-06 PROCEDURE — C1759 CATH, INTRA ECHOCARDIOGRAPHY: HCPCS | Performed by: INTERNAL MEDICINE

## 2025-06-06 PROCEDURE — C1766 INTRO/SHEATH,STRBLE,NON-PEEL: HCPCS | Performed by: INTERNAL MEDICINE

## 2025-06-06 PROCEDURE — 85347 COAGULATION TIME ACTIVATED: CPT

## 2025-06-06 PROCEDURE — 93656 COMPRE EP EVAL ABLTJ ATR FIB: CPT | Performed by: INTERNAL MEDICINE

## 2025-06-06 PROCEDURE — C1730 CATH, EP, 19 OR FEW ELECT: HCPCS | Performed by: INTERNAL MEDICINE

## 2025-06-06 PROCEDURE — 2500000004 HC RX 250 GENERAL PHARMACY W/ HCPCS (ALT 636 FOR OP/ED): Performed by: INTERNAL MEDICINE

## 2025-06-06 PROCEDURE — C1732 CATH, EP, DIAG/ABL, 3D/VECT: HCPCS | Performed by: INTERNAL MEDICINE

## 2025-06-06 PROCEDURE — 2720000007 HC OR 272 NO HCPCS: Performed by: INTERNAL MEDICINE

## 2025-06-06 PROCEDURE — G0269 OCCLUSIVE DEVICE IN VEIN ART: HCPCS | Performed by: INTERNAL MEDICINE

## 2025-06-06 PROCEDURE — 7100000002 HC RECOVERY ROOM TIME - EACH INCREMENTAL 1 MINUTE: Performed by: INTERNAL MEDICINE

## 2025-06-06 PROCEDURE — 76937 US GUIDE VASCULAR ACCESS: CPT | Performed by: INTERNAL MEDICINE

## 2025-06-06 RX ORDER — ONDANSETRON HYDROCHLORIDE 2 MG/ML
INJECTION, SOLUTION INTRAVENOUS AS NEEDED
Status: DISCONTINUED | OUTPATIENT
Start: 2025-06-06 | End: 2025-06-06

## 2025-06-06 RX ORDER — ALBUMIN HUMAN 250 G/1000ML
SOLUTION INTRAVENOUS CONTINUOUS PRN
Status: DISCONTINUED | OUTPATIENT
Start: 2025-06-06 | End: 2025-06-06

## 2025-06-06 RX ORDER — PHENYLEPHRINE 10 MG/250 ML(40 MCG/ML)IN 0.9 % SOD.CHLORIDE INTRAVENOUS
CONTINUOUS PRN
Status: DISCONTINUED | OUTPATIENT
Start: 2025-06-06 | End: 2025-06-06

## 2025-06-06 RX ORDER — PHENYLEPHRINE HCL IN 0.9% NACL 0.4MG/10ML
SYRINGE (ML) INTRAVENOUS AS NEEDED
Status: DISCONTINUED | OUTPATIENT
Start: 2025-06-06 | End: 2025-06-06

## 2025-06-06 RX ORDER — GLYCOPYRROLATE 0.2 MG/ML
INJECTION INTRAMUSCULAR; INTRAVENOUS AS NEEDED
Status: DISCONTINUED | OUTPATIENT
Start: 2025-06-06 | End: 2025-06-06

## 2025-06-06 RX ORDER — COLCHICINE 0.6 MG/1
0.6 TABLET ORAL DAILY
Qty: 14 TABLET | Refills: 0 | Status: SHIPPED | OUTPATIENT
Start: 2025-06-06 | End: 2025-06-20

## 2025-06-06 RX ORDER — LIDOCAINE HYDROCHLORIDE 20 MG/ML
INJECTION, SOLUTION EPIDURAL; INFILTRATION; INTRACAUDAL; PERINEURAL AS NEEDED
Status: DISCONTINUED | OUTPATIENT
Start: 2025-06-06 | End: 2025-06-06

## 2025-06-06 RX ORDER — MIDAZOLAM HYDROCHLORIDE 1 MG/ML
INJECTION INTRAMUSCULAR; INTRAVENOUS AS NEEDED
Status: DISCONTINUED | OUTPATIENT
Start: 2025-06-06 | End: 2025-06-06

## 2025-06-06 RX ORDER — PROTAMINE SULFATE 10 MG/ML
INJECTION, SOLUTION INTRAVENOUS AS NEEDED
Status: DISCONTINUED | OUTPATIENT
Start: 2025-06-06 | End: 2025-06-06

## 2025-06-06 RX ORDER — PROPOFOL 10 MG/ML
INJECTION, EMULSION INTRAVENOUS AS NEEDED
Status: DISCONTINUED | OUTPATIENT
Start: 2025-06-06 | End: 2025-06-06

## 2025-06-06 RX ORDER — OMEPRAZOLE 40 MG/1
40 CAPSULE, DELAYED RELEASE ORAL
Qty: 60 CAPSULE | Refills: 0 | Status: SHIPPED | OUTPATIENT
Start: 2025-06-06

## 2025-06-06 RX ORDER — HEPARIN SODIUM 10000 [USP'U]/100ML
INJECTION, SOLUTION INTRAVENOUS CONTINUOUS PRN
Status: DISCONTINUED | OUTPATIENT
Start: 2025-06-06 | End: 2025-06-06 | Stop reason: HOSPADM

## 2025-06-06 RX ORDER — HEPARIN SODIUM 1000 [USP'U]/ML
INJECTION, SOLUTION INTRAVENOUS; SUBCUTANEOUS AS NEEDED
Status: DISCONTINUED | OUTPATIENT
Start: 2025-06-06 | End: 2025-06-06 | Stop reason: HOSPADM

## 2025-06-06 RX ORDER — ONDANSETRON HYDROCHLORIDE 2 MG/ML
4 INJECTION, SOLUTION INTRAVENOUS ONCE
Status: COMPLETED | OUTPATIENT
Start: 2025-06-06 | End: 2025-06-06

## 2025-06-06 RX ORDER — FENTANYL CITRATE 50 UG/ML
INJECTION, SOLUTION INTRAMUSCULAR; INTRAVENOUS AS NEEDED
Status: DISCONTINUED | OUTPATIENT
Start: 2025-06-06 | End: 2025-06-06

## 2025-06-06 RX ORDER — ROCURONIUM BROMIDE 10 MG/ML
INJECTION, SOLUTION INTRAVENOUS AS NEEDED
Status: DISCONTINUED | OUTPATIENT
Start: 2025-06-06 | End: 2025-06-06

## 2025-06-06 RX ORDER — ACETAMINOPHEN 10 MG/ML
INJECTION, SOLUTION INTRAVENOUS AS NEEDED
Status: DISCONTINUED | OUTPATIENT
Start: 2025-06-06 | End: 2025-06-06

## 2025-06-06 RX ADMIN — PROTAMINE SULFATE 40 MG: 10 INJECTION, SOLUTION INTRAVENOUS at 11:38

## 2025-06-06 RX ADMIN — ACETAMINOPHEN 1000 MG: 10 INJECTION, SOLUTION INTRAVENOUS at 11:24

## 2025-06-06 RX ADMIN — ROCURONIUM BROMIDE 10 MG: 10 INJECTION INTRAVENOUS at 09:41

## 2025-06-06 RX ADMIN — NOREPINEPHRINE BITARTRATE 8 MCG: 1 INJECTION, SOLUTION, CONCENTRATE INTRAVENOUS at 09:44

## 2025-06-06 RX ADMIN — PROPOFOL 50 MG: 10 INJECTION, EMULSION INTRAVENOUS at 08:24

## 2025-06-06 RX ADMIN — ROCURONIUM BROMIDE 20 MG: 10 INJECTION INTRAVENOUS at 10:54

## 2025-06-06 RX ADMIN — FENTANYL CITRATE 50 MCG: 50 INJECTION, SOLUTION INTRAMUSCULAR; INTRAVENOUS at 08:20

## 2025-06-06 RX ADMIN — SUGAMMADEX 400 MG: 100 INJECTION, SOLUTION INTRAVENOUS at 11:40

## 2025-06-06 RX ADMIN — PHENYLEPHRINE-NACL IV SOLUTION 10 MG/250ML-0.9% 0.3 MCG/KG/MIN: 10-0.9/25 SOLUTION at 08:30

## 2025-06-06 RX ADMIN — Medication 80 MCG: at 08:33

## 2025-06-06 RX ADMIN — ONDANSETRON 4 MG: 2 INJECTION INTRAMUSCULAR; INTRAVENOUS at 11:38

## 2025-06-06 RX ADMIN — NOREPINEPHRINE BITARTRATE 8 MCG: 1 INJECTION, SOLUTION, CONCENTRATE INTRAVENOUS at 11:34

## 2025-06-06 RX ADMIN — SODIUM CHLORIDE, SODIUM LACTATE, POTASSIUM CHLORIDE, AND CALCIUM CHLORIDE: 600; 310; 30; 20 INJECTION, SOLUTION INTRAVENOUS at 08:08

## 2025-06-06 RX ADMIN — MIDAZOLAM HYDROCHLORIDE 2 MG: 2 INJECTION, SOLUTION INTRAMUSCULAR; INTRAVENOUS at 08:08

## 2025-06-06 RX ADMIN — NOREPINEPHRINE BITARTRATE 8 MCG: 1 INJECTION, SOLUTION, CONCENTRATE INTRAVENOUS at 12:01

## 2025-06-06 RX ADMIN — Medication 80 MCG: at 09:14

## 2025-06-06 RX ADMIN — NOREPINEPHRINE BITARTRATE 8 MCG: 1 INJECTION, SOLUTION, CONCENTRATE INTRAVENOUS at 11:40

## 2025-06-06 RX ADMIN — NOREPINEPHRINE BITARTRATE 8 MCG: 1 INJECTION, SOLUTION, CONCENTRATE INTRAVENOUS at 09:46

## 2025-06-06 RX ADMIN — ONDANSETRON 4 MG: 2 INJECTION, SOLUTION INTRAMUSCULAR; INTRAVENOUS at 13:21

## 2025-06-06 RX ADMIN — NOREPINEPHRINE BITARTRATE 8 MCG: 1 INJECTION, SOLUTION, CONCENTRATE INTRAVENOUS at 11:24

## 2025-06-06 RX ADMIN — PROPOFOL 20 MG: 10 INJECTION, EMULSION INTRAVENOUS at 08:26

## 2025-06-06 RX ADMIN — GLYCOPYRROLATE 0.2 MG: 0.2 INJECTION INTRAMUSCULAR; INTRAVENOUS at 11:54

## 2025-06-06 RX ADMIN — NOREPINEPHRINE BITARTRATE 8 MCG: 1 INJECTION, SOLUTION, CONCENTRATE INTRAVENOUS at 11:48

## 2025-06-06 RX ADMIN — FENTANYL CITRATE 50 MCG: 50 INJECTION, SOLUTION INTRAMUSCULAR; INTRAVENOUS at 11:45

## 2025-06-06 RX ADMIN — ROCURONIUM BROMIDE 20 MG: 10 INJECTION INTRAVENOUS at 10:00

## 2025-06-06 RX ADMIN — ROCURONIUM BROMIDE 70 MG: 10 INJECTION INTRAVENOUS at 08:26

## 2025-06-06 RX ADMIN — ALBUMIN HUMAN: 0.25 SOLUTION INTRAVENOUS at 09:44

## 2025-06-06 RX ADMIN — PROPOFOL 20 MG: 10 INJECTION, EMULSION INTRAVENOUS at 11:45

## 2025-06-06 RX ADMIN — LIDOCAINE HYDROCHLORIDE 40 MG: 20 INJECTION, SOLUTION EPIDURAL; INFILTRATION; INTRACAUDAL; PERINEURAL at 08:21

## 2025-06-06 RX ADMIN — DEXAMETHASONE SODIUM PHOSPHATE 10 MG: 4 INJECTION, SOLUTION INTRA-ARTICULAR; INTRALESIONAL; INTRAMUSCULAR; INTRAVENOUS; SOFT TISSUE at 08:32

## 2025-06-06 SDOH — HEALTH STABILITY: MENTAL HEALTH: CURRENT SMOKER: 0

## 2025-06-06 ASSESSMENT — COLUMBIA-SUICIDE SEVERITY RATING SCALE - C-SSRS
1. IN THE PAST MONTH, HAVE YOU WISHED YOU WERE DEAD OR WISHED YOU COULD GO TO SLEEP AND NOT WAKE UP?: NO
2. HAVE YOU ACTUALLY HAD ANY THOUGHTS OF KILLING YOURSELF?: NO
6. HAVE YOU EVER DONE ANYTHING, STARTED TO DO ANYTHING, OR PREPARED TO DO ANYTHING TO END YOUR LIFE?: NO

## 2025-06-06 ASSESSMENT — PAIN SCALES - GENERAL: PAIN_LEVEL: 0

## 2025-06-06 NOTE — ANESTHESIA PROCEDURE NOTES
Arterial Line:    Date/Time: 6/6/2025 8:45 AM    Staffing  Performed: CAA and MACY   Authorized by: Ulices Rizzo MD    Performed by: ISSA Davila    An arterial line was placed. Procedure performed using surface landmarks.in the OR for the following indication(s): continuous blood pressure monitoring and blood sampling needed.    A 20 gauge (size), 1 and 1/4 inch (length), Angiocath (type) catheter was placed into the Left radial artery, secured by Tegademauricio   Seldinger technique used.  Events:  patient tolerated procedure well with no complications.      Additional notes:  Chlorohexadine prep.  Sterile gauze and gloves used.  Good waveform.  No abnormalities or hematoma.

## 2025-06-06 NOTE — ANESTHESIA PROCEDURE NOTES
Peripheral IV  Date/Time: 6/6/2025 8:47 AM      Placement  Needle size: 16 G  Laterality: left  Location: forearm

## 2025-06-06 NOTE — ANESTHESIA PREPROCEDURE EVALUATION
"ALLERGIES:  Allergies[1]     MEDICAL HISTORY:  Medical History[2]     Relevant Problems   Cardiac   (+) Familial hypercholesterolemia   (+) Hyperlipidemia   (+) Permanent atrial fibrillation (Multi)      Neuro   (+) Anxiety      GI   (+) GERD (gastroesophageal reflux disease)        SURGICAL HISTORY:  Surgical History[3]     MEDICATIONS:  Current Outpatient Medications   Medication Instructions    apixaban (ELIQUIS) 5 mg, oral, 2 times daily    melatonin 5 mg tablet,chewable 1 tablet, Daily PRN    metoprolol succinate XL (Toprol-XL) 200 mg 24 hr tablet 1 tablet, Daily    omeprazole (PriLOSEC) 40 mg DR capsule 1 capsule, Daily    psyllium (Metamucil) 3.4 gram packet 1 packet, Daily    rosuvastatin (Crestor) 40 mg tablet 1 tablet, Daily    traZODone (Desyrel) 50 mg tablet 1 tablet, Nightly        VITALS:      6/2/2025    11:37 AM 2/14/2025     1:34 PM 1/17/2025     8:59 AM   Vitals   Systolic 140 128 125   Diastolic 74 64 72   BP Location  Left arm    Heart Rate 74 96 84   Height 1.816 m (5' 11.5\") 1.803 m (5' 11\") 1.829 m (6')   Weight (lb) 198 195 198.6   BMI 27.23 kg/m2 27.2 kg/m2 26.94 kg/m2   BSA (m2) 2.13 m2 2.11 m2 2.14 m2   Visit Report Report Report Report       LABS:   BMP Extended  Results from last 7 days   Lab Units 05/30/25  1319 05/30/25  1317   [facility] SODIUM mmol/L 138 138   [facility] POTASSIUM mmol/L 4.1 4.2   [facility] CHLORIDE mmol/L 101 101   [facility] CO2 mmol/L 29 27   [facility] BUN mg/dL 12 12   [facility] CREATININE mg/dL 0.97 0.99   QUEST EGFR mL/min/1.73m2 87 85   [facility] GLUCOSE mg/dL 91 90   [facility] CALCIUM mg/dL 8.9 9.0    , LFT   Lab Results   Component Value Date    ALT 19 05/30/2025    AST 17 05/30/2025    ALKPHOS 53 05/30/2025    BILITOT 0.8 05/30/2025   , MELD Computed MELD 3.0 unavailable. One or more values for this score either were not found within the given timeframe or did not fit some other criterion.  Computed MELD-Na unavailable. One or more values for this score either were not found within the given " timeframe or did not fit some other criterion.  , CBC Extended       Latest Ref Rng & Units 11/22/2024     9:24 AM   CBC   Hb 13.5 - 17.5 g/dL 14.9    Hct 41.0 - 52.0 % 46.2    MCV 80 - 100 fL 88    RDW 11.5 - 14.5 % 13.3      , Coags Extendied   , A1C   Lab Results   Component Value Date    HGBA1C 6.2 (H) 10/25/2022   , ABG Extended          IMAGES:  EKG          Encounter Date: 06/02/25   ECG 12 lead (Clinic Performed)   Result Value    Ventricular Rate 74    Atrial Rate 71    QRS Duration 102    QT Interval 408    QTC Calculation(Bazett) 452    R Axis 38    T Axis 38    QRS Count 12    Q Onset 216    T Offset 420    QTC Fredericia 437    Narrative    Atrial fibrillation  Septal infarct (cited on or before 14-FEB-2025)  Abnormal ECG  When compared with ECG of 14-FEB-2025 13:31, (unconfirmed)  T wave inversion no longer evident in Inferior leads  Nonspecific T wave abnormality has replaced inverted T waves in Lateral leads      , ECHO         Transesophageal Echo (DAYANARA) Complete With Doppler And Color 12/06/2024    Narrative  [facility], [address] [place] [address]  Tel [phone] and Fax [phone]    TRANSESOPHAGEAL ECHOCARDIOGRAM REPORT      Patient Name:       EVELYN HUSSEIN    Reading Physician:    [id number] David Sanderson MD  Study Date:         12/6/2024           Ordering Provider:    [id number] VANESSA EDMOND  MRN/PID:            [id number]            Fellow:               [id number] Bijan Sheets MD  Accession#:         [id number]        Nurse:                Gayle HELM  Date of Birth/Age:  1960 / 64      Sonographer:  nelson  Gender assigned at  M                   Additional Staff:     Opal  Birth:                                                        Sharron GUZMAN  Height:             182.90 cm           Admit Date:           12/6/2024  Weight:             90.30 kg            Admission Status:     Outpatient  BSA / BMI:          2.13 m2 / 26.99     Encounter#:            [id number]  kg/m2  Blood Pressure:     136/96 mmHg         Department Location:    Study Type:    TRANSESOPHAGEAL ECHO (DAYANARA)  Diagnosis/ICD: Permanent AFib-I48.21  Indication:    A-fib  CPT Code:      DAYANARA Complete-58254; Doppler Limited-98297; Color Doppler-47247    Patient History:  Drug Allergies:    None  Pertinent History: 65 y/o male here for DAYANARA pre DCCV. A-fib on Eliquis, anxiety  and GERD.      PHYSICIAN INTERPRETATION:  DAYANARA Details: Technically adequate omniplane transesophageal echocardiogram performed. Agitated saline contrast echo was performed to assess for the presence of a patent foramen ovale.  DAYANARA Medication: The patient was sedated by Anesthesia; please refer to anesthesia flow sheet for medications used.  DAYANARA Procedure: The probe was passed without difficulty. Complications encountered during procedure: Patient tolerated the procedure well without any apparent complications.  Left Ventricle: Left ventricular ejection fraction is normal, by visual estimate at 60-65%. The left ventricular cavity size was not assessed. Left ventricular diastolic filling was not assessed.  Left Atrium: The left atrium is normal in size. There is a small patent foramen ovale with predominantly left to right shunting across the atrial septum. A patent foramen ovale was visualized using color Doppler. A bubble study using agitated saline was performed. Bubble study is negative. There is no thrombus visualized in the left atrial appendage.  Right Ventricle: The right ventricle is normal in size. There is normal right ventricular global systolic function.  Right Atrium: The right atrium is normal in size.  Aortic Valve: The aortic valve is trileaflet. There is no evidence of aortic valve regurgitation.  Mitral Valve: The mitral valve is normal in structure. There is trace mitral valve regurgitation.  Tricuspid Valve: The tricuspid valve is structurally normal. There is trace tricuspid regurgitation.  Pulmonic  Valve: The pulmonic valve is structurally normal. There is trace pulmonic valve regurgitation.  Pericardium: There is no pericardial effusion noted.  Aorta: The aortic root is abnormal. The aortic root appears normal in size and measures 3.70 cm. No significant aortic plaque is visualized.  Pulmonary Artery: The pulmonary artery is not well visualized.  Systemic Veins: The inferior vena cava was not well visualized.  In comparison to the previous echocardiogram(s): Compared with study dated 11/4/2024, This study demonstrated a PFO through color Doppler with left to right shunt.      CONCLUSIONS:  1. Left ventricular ejection fraction is normal, by visual estimate at 60-65%.  2. There is normal right ventricular global systolic function.  3. Normal aortic root.  4. The pulmonary artery is not well visualized.  5. Small patent foramen ovale.  6. Compared with study dated 11/4/2024, This study demonstrated a PFO through color Doppler with left to right shunt.    QUANTITATIVE DATA SUMMARY:    2D MEASUREMENTS:         Normal Ranges:  Ao Root d:       3.70 cm (2.0-3.7cm)      LV SYSTOLIC FUNCTION BY 2D PLANIMETRY (MOD):  Normal Ranges:  EF-Visual:      63 %  LV EF Reported: 63 %      [id number] David Sanderson MD  Electronically signed on 12/6/2024 at 10:52:52 AM        ** Final **    , CARDIAC CATH      No results found for this or any previous visit from the past 730 days.   , CT Head/Neck     No results found for this or any previous visit from the past 760 days.    SOCIAL:  Tobacco Use History[4]   Social History     Substance and Sexual Activity   Alcohol Use Yes    Comment: occasional      Social History     Substance and Sexual Activity   Drug Use Never        NPO STATUS:  NPO/Void Status  Date of Last Liquid: 06/05/25  Time of Last Liquid: 2300  Date of Last Solid: 06/05/25  Time of Last Solid: 2300        Clinical Areas Reviewed:   Tobacco  Allergies  Meds   Med Hx  Surg Hx   Fam Hx  Soc Hx        Anesthesia  Assessment:    Physical Exam    Airway  Mallampati: II  TM distance: >3 FB  Mouth opening: 3 or more finger widths     Cardiovascular    Dental    Pulmonary    Abdominal            Anesthesia Plan    History of general anesthesia?: yes  History of complications of general anesthesia?: no    ASA 3     general     The patient is not a current smoker.    inhalational and intravenous induction   Postoperative pain plan includes opioids.  Trial extubation is planned.  Anesthetic plan and risks discussed with patient.  Use of blood products discussed with patient who consented to blood products.    Plan discussed with attending.               [1] No Known Allergies  [2]   Past Medical History:  Diagnosis Date    Arrhythmia     Hyperlipidemia    [3]   Past Surgical History:  Procedure Laterality Date    CARDIAC ELECTROPHYSIOLOGY PROCEDURE N/A 12/6/2024    Procedure: Cardioversion;  Surgeon: Shine Hernandez MD;  Location: [place] [address] Cardiac Cath Lab;  Service: Electrophysiology;  Laterality: N/A;  Needs DAYANARA prior to cardioversion   [4]   Social History  Tobacco Use   Smoking Status Never   Smokeless Tobacco Never

## 2025-06-06 NOTE — ANESTHESIA PROCEDURE NOTES
Airway  Date/Time: 6/6/2025 8:27 AM  Reason: elective    Airway not difficult    Staffing  Performed: MACY   Authorized by: Ulices Rizzo MD    Performed by: ISSA Davila  Patient location during procedure: OR    Patient Condition  Indications for airway management: anesthesia and airway protection  Patient position: sniffing  Sedation level: deep     Final Airway Details   Preoxygenated: yes  Final airway type: endotracheal airway  Successful airway: ETT  Cuffed: yes   Successful intubation technique: direct laryngoscopy  Adjuncts used in placement: intubating stylet and cricoid pressure  Blade: Karthik  Blade size: #4  ETT size (mm): 7.5  Cormack-Lehane Classification: grade I - full view of glottis  Placement verified by: capnometry and palpation of cuff   Measured from: lips  ETT to lips (cm): 23  Number of attempts at approach: 1    Additional Comments  Atraumatic. Lips and teeth and preoperative condition.

## 2025-06-06 NOTE — ANESTHESIA POSTPROCEDURE EVALUATION
Patient: Eleno Kenney    Procedure Summary       Date: 06/06/25 Room / Location: [facility] / [facility] Cardiac Cath Lab    Anesthesia Start: 0804 Anesthesia Stop: 1218    Procedure: Ablation A-Fib Persistant Diagnosis:       Persistent atrial fibrillation (Multi)      (Persistent atrial fibrillation)    Providers: Shine Hernandez MD Responsible Provider: Ulices Rizzo MD    Anesthesia Type: general ASA Status: 3            Anesthesia Type: general    Vitals Value Taken Time   BP 90/65 06/06/25 12:24   Temp 36 06/06/25 12:24   Pulse 60 06/06/25 12:24   Resp 14 06/06/25 12:24   SpO2 100 06/06/25 12:24       Anesthesia Post Evaluation    Patient location during evaluation: floor  Patient participation: complete - patient participated  Level of consciousness: awake and alert  Pain score: 0  Pain management: adequate  Airway patency: patent  Cardiovascular status: acceptable  Respiratory status: acceptable  Hydration status: acceptable  Postoperative Nausea and Vomiting: mild        There were no known notable events for this encounter.

## 2025-06-06 NOTE — DISCHARGE INSTRUCTIONS
INSTRUCTIONS AFTER ABLATION PROCEDURE:    * You will need to continue blood thinner (Eliquis every 12 hours) until instructed otherwise. It is important not to interrupt blood thinner for any reason (other than an emergency) during the first 30 days after ablation.    * Depending on the type of energy used for ablation (thermal vs non-thermal), you may be sent home on Pantoprazole (a heartburn medicine) for 4 weeks to protect the esophagus as it can become irritated with ablation using thermal energy. It is very important that you take this medication if prescribed.     * All other medications will generally remain the same unless you are told otherwise.  Resume taking your home medications today (including blood thinner) as listed on the discharge instructions.    * In the first week post-ablation you should take it easy. No heavy lifting or heavy exercise, no treadmill. You can use the stairs if needed but go slowly and minimize the number of times up and down.    * Some minor bruising is common at each groin access site with minor soreness as if you had banged the area. Bruising may occasionally be seen to extend down the leg. This is normal as is an occasional small quarter sized bump in the area. If larger swelling or more significant pain occurs at the area, please contact the office or go the nearest Emergency Room.    * You may have some minor chest pain for the next week or so. The pain will often worsen with a deep breath and be better when leaning forward. This is pericardial chest pain from the ablation and is generally not of concern. It should resolve within a week although it might increase for a day or so after the ablation.    * If you develop unexplained fevers exceeding 100 degrees anytime within the first 3 weeks post-ablation, you need to contact the office. Low grade fevers of around 99 degrees are common in the first day or so post-ablation.    * Atrial fibrillation (AFib) can recur in all  patients who undergo this ablation for up to 4-8 weeks post-ablation. The ablation itself can cause inflammation (pericarditis) in the atria and this can cause AFib. Some patients will actually experience an increased amount of atrial arrhythmia early after ablation. Approximately 1/3 of patients will have this early recurrence of AFib. Medications should be continued and your heart rate controlled. Nothing else needs be done initially except waiting as in many cases these episodes of AFib will prove self limited.    * Continue to follow up with your primary care physician, primary cardiologist, and any other specialists you normally see.    * No driving for 2 days post procedure (IF you were driving prior to procedure)    *Diet: Heart healthy    Call Provider If:  Breathing faster than normal.     Fever of 100.4 F (38 C) or higher.     Chills.     Any new concerning symptoms.     Passing out.     Patient Instructions, Next 24 hours:  DO NOT drive a car, operate machinery or power tools.  It is recommended that a responsible adult be with you for the first 24 hours.     DO NOT drink any alcoholic drinks or take any non-prescriptive medications that contain alcohol for the first 24 hours.     DO NOT make any important decisions for the first 24 hours.    Activity:  You are advised to go directly home from the hospital.     DO NOT lift anything heavier than 10 pounds for one week, this allows for proper healing of the groin.     No excessive exercise or treadmill use for one week. You may walk and do stairs, slowly.     No sexual activities for 24 hours after you arrive home.    Wound Care:  If slight bleeding should occur at groin site, lie down and have someone apply firm pressure just above the puncture site for 5 minutes.  If it continues or is profuse, call 911. Always notify your doctor if bleeding occurs.     Keep site clean and dry. Let air dry or you may use a simple bandaid.     Gently cleanse the puncture  site in your groin with soap and water only.     You may experience some tenderness, bruising or minimal inflammation.  If you have any concerns, you may contact the EP Lab or if any of these symptoms become excessive, contact your electrophysiologist or go to the emergency room.     No tub baths, soaking, hot tubs, or swimming for one week.     May shower the next day after your procedure.    Other Instructions:  If you have any questions about the effects of the sedative drugs or groin care, please call the physician who performed your procedure.    FOLLOW UP:  1) Primary care physician 2 weeks--call to schedule    2) Christen Delgado CNP ( Electrophysiology) 1 month after ablation as scheduled

## 2025-07-07 ENCOUNTER — OFFICE VISIT (OUTPATIENT)
Dept: CARDIOLOGY | Facility: CLINIC | Age: 65
End: 2025-07-07
Payer: MEDICARE

## 2025-07-07 VITALS
WEIGHT: 188.4 LBS | BODY MASS INDEX: 26.38 KG/M2 | HEIGHT: 71 IN | DIASTOLIC BLOOD PRESSURE: 63 MMHG | OXYGEN SATURATION: 96 % | SYSTOLIC BLOOD PRESSURE: 142 MMHG | HEART RATE: 48 BPM

## 2025-07-07 DIAGNOSIS — I48.11 LONGSTANDING PERSISTENT ATRIAL FIBRILLATION (MULTI): Primary | ICD-10-CM

## 2025-07-07 PROCEDURE — 1036F TOBACCO NON-USER: CPT | Performed by: NURSE PRACTITIONER

## 2025-07-07 PROCEDURE — 1159F MED LIST DOCD IN RCRD: CPT | Performed by: NURSE PRACTITIONER

## 2025-07-07 PROCEDURE — 93005 ELECTROCARDIOGRAM TRACING: CPT | Performed by: NURSE PRACTITIONER

## 2025-07-07 PROCEDURE — 99214 OFFICE O/P EST MOD 30 MIN: CPT | Performed by: NURSE PRACTITIONER

## 2025-07-07 PROCEDURE — 1160F RVW MEDS BY RX/DR IN RCRD: CPT | Performed by: NURSE PRACTITIONER

## 2025-07-07 PROCEDURE — 3008F BODY MASS INDEX DOCD: CPT | Performed by: NURSE PRACTITIONER

## 2025-07-07 RX ORDER — METOPROLOL SUCCINATE 200 MG/1
200 TABLET, EXTENDED RELEASE ORAL DAILY
Qty: 90 TABLET | Refills: 1 | Status: SHIPPED | OUTPATIENT
Start: 2025-07-07

## 2025-07-07 NOTE — PROGRESS NOTES
"Subjective   Eleno Kenney is a 65 y.o. male.    Chief Complaint:  Atrial Fibrillation    Eleno Kenney is a 65 y.o. with:   Pmh includes Anxiety, GERD, HL, Hypogonadism, and Tubular Adenoma.     Cardiac Hx  1. Persistent AF - (index dx 2011) per records, Pt was left in AF since that time without any prior attempts at DCC.  Had seen EP at [facility] who planned on anticoagulation with eliquis followed by hospitalization for anti-arrhythmic rx and DCC in Jan 2023, however patient decide at that time not to be admitted to the hospital.   DCCV Dec 6, 2024    CARDIAC TESTING:  -DAYANARA: (12/6/24) LVEF 60-65%.  Small PFO noted.   -ECHO/ TTE:  (11/4/24) LVEF 63%.  Mild AF dilated.  Pt in AF during exam.    -MONITOR:  Pre[employer]ice 14d (Jan 2025) showed AF (>99% burden) and PVCs (10% burden).  HR range  bpm with Avg HR 76 bpm  -Event monitor: (10/2022) Afib 100% of the time. HR ranged from 50 bpm-191 bpm Avg 100 bpm. Isolated VE s frequent( 10.5%) VE couplets were rare. VE triplets were rare ( < 1%)   -Stress test 11/2021: no CP with exercise and EKG was negative. LVEF was normal without regional wall motion abnormalities . Nunez treadmill score was 6. Exercise capacity was average.     Now s/p PVI, anterior wall and posterior wall isolation RFA with Dr. Hernandez 6/6/2025  ECG 7/7/2025 NSR with PVCs HR 53 bpm    Patient is now 1 month post ablation and is feeling pretty good. He was fatigued and had some LE edema the first few weeks, but that is now resolved.  He denies any bleeding or discharge from the right groin puncture site. He has some questions about his medications.    /63 (BP Location: Right arm, Patient Position: Sitting, BP Cuff Size: Adult)   Pulse (!) 48   Ht 1.803 m (5' 11\")   Wt 85.5 kg (188 lb 6.4 oz)   SpO2 96%   BMI 26.28 kg/m²   Medications Ordered Prior to Encounter[1]      Review of Systems   Constitutional: Negative for diaphoresis, fever and malaise/fatigue.   HENT:  Negative for " congestion and sore throat.    Eyes:  Negative for blurred vision and double vision.   Cardiovascular:  Negative for chest pain, dyspnea on exertion, irregular heartbeat, leg swelling, near-syncope, orthopnea, palpitations, paroxysmal nocturnal dyspnea and syncope.   Respiratory:  Negative for cough, hemoptysis, shortness of breath, snoring and sputum production.    Hematologic/Lymphatic: Negative for bleeding problem.   Skin:  Negative for rash.   Musculoskeletal:  Negative for falls, joint pain and myalgias.   Gastrointestinal:  Positive for constipation. Negative for abdominal pain, diarrhea, nausea and vomiting.   Neurological:  Negative for dizziness, headaches, light-headedness and weakness.   All other systems reviewed and are negative.      Objective   Constitutional:       Appearance: Healthy appearance. Not in distress.   Eyes:      Conjunctiva/sclera: Conjunctivae normal.   HENT:      Nose: Nose normal.    Mouth/Throat:      Pharynx: Oropharynx is clear.   Neck:      Vascular: No JVR. JVD normal.   Pulmonary:      Effort: Pulmonary effort is normal.      Breath sounds: Normal breath sounds. No wheezing. No rhonchi.   Chest:      Chest wall: Not tender to palpatation.   Cardiovascular:      Normal rate. Regular rhythm.      Murmurs: There is no murmur.      No rub.   Pulses:     Intact distal pulses.   Edema:     Peripheral edema absent.   Abdominal:      General: Bowel sounds are normal.      Palpations: Abdomen is soft.   Musculoskeletal: Normal range of motion.      Cervical back: Neck supple. Skin:     General: Skin is warm and dry.      Comments: Right groin site well approximated, no bruising/bleeding/swelling/redness/pain   Neurological:      Mental Status: Alert and oriented to person, place and time.      Motor: Motor function is intact.         Lab Review:   Admission on 06/06/2025, Discharged on 06/06/2025   Component Date Value    ABO TYPE 06/06/2025 O     Rh TYPE 06/06/2025 POS     ANTIBODY  SCREEN 06/06/2025 NEG     ABO TYPE 06/06/2025 O     Rh TYPE 06/06/2025 POS     POCT Activated Clotting * 06/06/2025 456 (H)     POCT Activated Clotting * 06/06/2025 430 (H)     POCT Activated Clotting * 06/06/2025 395 (H)     POCT Activated Clotting * 06/06/2025 372 (H)     POCT Activated Clotting * 06/06/2025 362 (H)    Office Visit on 06/02/2025   Component Date Value    Ventricular Rate 06/02/2025 74     Atrial Rate 06/02/2025 71     QRS Duration 06/02/2025 102     QT Interval 06/02/2025 408     QTC Calculation(Bazett) 06/02/2025 452     R Axis 06/02/2025 38     T Axis 06/02/2025 38     QRS Count 06/02/2025 12     Q Onset 06/02/2025 216     T Offset 06/02/2025 420     QTC Fredericia 06/02/2025 437    Orders Only on 05/30/2025   Component Date Value    CHOLESTEROL, TOTAL 05/30/2025 204 (H)     HDL CHOLESTEROL 05/30/2025 53     TRIGLYCERIDES 05/30/2025 87     LDL-CHOLESTEROL 05/30/2025 132 (H)     CHOL/HDLC RATIO 05/30/2025 3.8     NON HDL CHOLESTEROL 05/30/2025 151 (H)     GLUCOSE 05/30/2025 91     UREA NITROGEN (BUN) 05/30/2025 12     CREATININE 05/30/2025 0.97     EGFR 05/30/2025 87     SODIUM 05/30/2025 138     POTASSIUM 05/30/2025 4.1     CHLORIDE 05/30/2025 101     CARBON DIOXIDE 05/30/2025 29     ELECTROLYTE BALANCE 05/30/2025 8     CALCIUM 05/30/2025 8.9     PROTEIN, TOTAL 05/30/2025 6.7     ALBUMIN 05/30/2025 4.5     BILIRUBIN, TOTAL 05/30/2025 0.8     ALKALINE PHOSPHATASE 05/30/2025 53     AST 05/30/2025 17     ALT 05/30/2025 19    I personally reviewed the patient's latest PCP and cardiology notes.  I personally reviewed the patient's latest ECG, echocardiogram and stress test.  Results are above    Assessment/Plan   The encounter diagnosis was Longstanding persistent atrial fibrillation (Multi).  Patient is 1 month post ablation and in normal rhythm.  He has some normal post procedure symptoms, I recommend that he take metamucil daily to help with the constipation    We discussed normal symptoms that  can happen post ablation and when he should go to the ED or have an office visit.  Patient expresses understanding, all questions asked and answered.    Continue metoprolol and Eliquis  Follow up with me in 2-3 months or sooner as needed       [1]   Current Outpatient Medications on File Prior to Visit   Medication Sig Dispense Refill    apixaban (Eliquis) 5 mg tablet Take 1 tablet (5 mg) by mouth 2 times a day. 60 tablet 11    melatonin 5 mg tablet,chewable Chew 1 tablet once daily as needed.      omeprazole (PriLOSEC) 40 mg DR capsule Take 1 capsule (40 mg) by mouth 2 times a day before meals. (Patient taking differently: Take 1 capsule (40 mg) by mouth 2 times a day before meals. Taking once a day) 60 capsule 0    psyllium (Metamucil) 3.4 gram packet Take 1 packet by mouth once daily. (Patient taking differently: Take 1 packet by mouth once daily. As needed)      rosuvastatin (Crestor) 40 mg tablet Take 1 tablet (40 mg) by mouth once daily.      [DISCONTINUED] metoprolol succinate XL (Toprol-XL) 200 mg 24 hr tablet Take 1 tablet (200 mg) by mouth once daily.      [DISCONTINUED] traZODone (Desyrel) 50 mg tablet Take 1 tablet (50 mg) by mouth once daily at bedtime. (Patient not taking: Reported on 7/7/2025)       No current facility-administered medications on file prior to visit.

## 2025-07-08 ENCOUNTER — OFFICE VISIT (OUTPATIENT)
Dept: URGENT CARE | Age: 65
End: 2025-07-08
Payer: MEDICARE

## 2025-07-08 VITALS
TEMPERATURE: 97.8 F | RESPIRATION RATE: 17 BRPM | SYSTOLIC BLOOD PRESSURE: 163 MMHG | DIASTOLIC BLOOD PRESSURE: 69 MMHG | OXYGEN SATURATION: 96 % | HEART RATE: 55 BPM

## 2025-07-08 DIAGNOSIS — I48.21 PERMANENT ATRIAL FIBRILLATION (MULTI): Primary | ICD-10-CM

## 2025-07-08 DIAGNOSIS — R30.0 DYSURIA: Primary | ICD-10-CM

## 2025-07-08 DIAGNOSIS — K64.9 ACUTE HEMORRHOID: ICD-10-CM

## 2025-07-08 LAB
ATRIAL RATE: 53 BPM
P AXIS: 56 DEGREES
P OFFSET: 198 MS
P ONSET: 130 MS
POC APPEARANCE, URINE: CLEAR
POC BILIRUBIN, URINE: NEGATIVE
POC BLOOD, URINE: NEGATIVE
POC COLOR, URINE: YELLOW
POC GLUCOSE, URINE: NEGATIVE MG/DL
POC KETONES, URINE: NEGATIVE MG/DL
POC LEUKOCYTES, URINE: NEGATIVE
POC NITRITE,URINE: NEGATIVE
POC PH, URINE: 6 PH
POC PROTEIN, URINE: NEGATIVE MG/DL
POC SPECIFIC GRAVITY, URINE: <=1.005
POC UROBILINOGEN, URINE: 0.2 EU/DL
PR INTERVAL: 180 MS
Q ONSET: 220 MS
QRS COUNT: 8 BEATS
QRS DURATION: 92 MS
QT INTERVAL: 486 MS
QTC CALCULATION(BAZETT): 456 MS
QTC FREDERICIA: 466 MS
R AXIS: 86 DEGREES
T AXIS: 22 DEGREES
T OFFSET: 463 MS
VENTRICULAR RATE: 53 BPM

## 2025-07-08 PROCEDURE — 81003 URINALYSIS AUTO W/O SCOPE: CPT

## 2025-07-08 PROCEDURE — 99203 OFFICE O/P NEW LOW 30 MIN: CPT

## 2025-07-08 PROCEDURE — 1036F TOBACCO NON-USER: CPT

## 2025-07-08 PROCEDURE — 1159F MED LIST DOCD IN RCRD: CPT

## 2025-07-08 RX ORDER — DIBUCAINE 1 %
OINTMENT (GRAM) TOPICAL AS NEEDED
Qty: 56 G | Refills: 0 | Status: SHIPPED | OUTPATIENT
Start: 2025-07-08 | End: 2025-07-18

## 2025-07-08 RX ORDER — SULFAMETHOXAZOLE AND TRIMETHOPRIM 800; 160 MG/1; MG/1
1 TABLET ORAL 2 TIMES DAILY
Qty: 14 TABLET | Refills: 0 | Status: SHIPPED | OUTPATIENT
Start: 2025-07-08 | End: 2025-07-15

## 2025-07-08 ASSESSMENT — ENCOUNTER SYMPTOMS
FREQUENCY: 1
NAUSEA: 0
WEAKNESS: 0
RECTAL PAIN: 1
SNORING: 0
BACK PAIN: 0
FEVER: 0
ORTHOPNEA: 0
COUGH: 0
DIFFICULTY URINATING: 1
FEVER: 0
DIARRHEA: 0
FLANK PAIN: 0
BLURRED VISION: 0
HEMATURIA: 0
ANAL BLEEDING: 0
PND: 0
SHORTNESS OF BREATH: 0
CHILLS: 0
DIZZINESS: 0
SORE THROAT: 0
VOMITING: 0
NEAR-SYNCOPE: 0
MYALGIAS: 0
PALPITATIONS: 0
LIGHT-HEADEDNESS: 0
HEMOPTYSIS: 0
SHORTNESS OF BREATH: 0
VOMITING: 0
SPUTUM PRODUCTION: 0
HEADACHES: 0
ABDOMINAL PAIN: 0
ABDOMINAL PAIN: 0
DIARRHEA: 0
DIAPHORESIS: 0
SYNCOPE: 0
DYSURIA: 1
NAUSEA: 0
BLOOD IN STOOL: 0
IRREGULAR HEARTBEAT: 0
DOUBLE VISION: 0
DYSPNEA ON EXERTION: 0
FALLS: 0
CONSTIPATION: 1
CHEST TIGHTNESS: 0
APPETITE CHANGE: 0

## 2025-07-08 NOTE — PATIENT INSTRUCTIONS
If you develop nausea, vomiting, back pain and fever go to ER    Drink plenty of fluids    Suppository for hemorrhoids prescribed  Dibucaine is topical pain reliving as needed    Start antibiotic as prescribed    Results will be in mychart from culture  If we have to change antibiotics based on results we will give you a call, will take about 2 days to come back    Follow up with pcp.

## 2025-07-08 NOTE — PROGRESS NOTES
Subjective   Patient ID: Eleno Kenney is a 65 y.o. male. They present today with a chief complaint of Difficulty Urinating.    History of Present Illness  Patient presents for urinary urgency, frequency, burning with urination and occasional difficulty starting urine stream. Denies hematuria. Denies fever, chills, sweats. Denies n/v/d. Denies chest pain, sob. Denies back pain, flank pain, denies testicular pain or swelling. Patient explains that symptoms started when he had some constipation last week, he has also had some rectal pain and is requesting recommendations for hemorrhoids.      Difficulty Urinating  Presenting symptoms: dysuria    Presenting symptoms: no penile discharge and no penile pain    Associated symptoms: urinary frequency    Associated symptoms: no abdominal pain, no diarrhea, no fever, no flank pain, no hematuria, no nausea, no penile swelling, no scrotal swelling and no vomiting        Past Medical History  Allergies as of 07/08/2025    (No Known Allergies)       Prescriptions Prior to Admission[1]     Medical History[2]    Surgical History[3]     reports that he has never smoked. He has never used smokeless tobacco. He reports current alcohol use. He reports that he does not use drugs.    Review of Systems  Review of Systems   Constitutional:  Negative for appetite change, chills and fever.   Respiratory:  Negative for chest tightness and shortness of breath.    Cardiovascular:  Negative for chest pain.   Gastrointestinal:  Positive for rectal pain. Negative for abdominal pain, anal bleeding, blood in stool, diarrhea, nausea and vomiting.   Genitourinary:  Positive for difficulty urinating, dysuria, frequency and urgency. Negative for flank pain, hematuria, penile discharge, penile pain, penile swelling, scrotal swelling and testicular pain.   Musculoskeletal:  Negative for back pain.   Skin:  Negative for rash.                                  Objective    Vitals:    07/08/25 1628    BP: 163/69   Pulse: 55   Resp: 17   Temp: 36.6 °C (97.8 °F)   TempSrc: Oral   SpO2: 96%     No LMP for male patient.    Physical Exam  Constitutional:       General: He is not in acute distress.     Appearance: He is not toxic-appearing.   HENT:      Head: Normocephalic and atraumatic.   Eyes:      Extraocular Movements: Extraocular movements intact.      Pupils: Pupils are equal, round, and reactive to light.   Cardiovascular:      Rate and Rhythm: Normal rate and regular rhythm.   Pulmonary:      Effort: Pulmonary effort is normal. No respiratory distress.      Breath sounds: Normal breath sounds. No wheezing.   Abdominal:      General: Abdomen is flat. There is no distension.      Palpations: Abdomen is soft.      Tenderness: There is no abdominal tenderness. There is no right CVA tenderness, left CVA tenderness or guarding.   Musculoskeletal:      Cervical back: Normal range of motion.   Neurological:      Mental Status: He is alert.         Procedures    Point of Care Test & Imaging Results from this visit  Results for orders placed or performed in visit on 07/08/25   POCT UA Automated manually resulted   Result Value Ref Range    POC Color, Urine Yellow Straw, Yellow, Light-Yellow    POC Appearance, Urine Clear Clear    POC Glucose, Urine NEGATIVE NEGATIVE mg/dl    POC Bilirubin, Urine NEGATIVE NEGATIVE    POC Ketones, Urine NEGATIVE NEGATIVE mg/dl    POC Specific Gravity, Urine <=1.005 1.005 - 1.035    POC Blood, Urine NEGATIVE NEGATIVE    POC PH, Urine 6.0 No Reference Range Established PH    POC Protein, Urine NEGATIVE NEGATIVE mg/dl    POC Urobilinogen, Urine 0.2 0.2, 1.0 EU/DL    Poc Nitrite, Urine NEGATIVE NEGATIVE    POC Leukocytes, Urine NEGATIVE NEGATIVE      Imaging  No results found.    Cardiology, Vascular, and Other Imaging  ECG 12 Lead  Result Date: 7/8/2025  Sinus bradycardia with frequent Premature ventricular complexes Otherwise normal ECG When compared with ECG of 02-JUN-2025 11:33, Sinus  rhythm has replaced Atrial fibrillation Nonspecific T wave abnormality, improved in Lateral leads        Diagnostic study results (if any) were reviewed by Erika Hernandez PA-C.    Assessment/Plan   Allergies, medications, history, and pertinent labs/EKGs/Imaging reviewed by Erika Hernandez PA-C.     Medical Decision Making  MDM- History and examination are suggestive of UTI vs prostatitis despite negative urinalysis results. No evidence of pyelonephritis or sepsis.  Will treat based upon history with pending culture results. Signs and symptoms also consistent with external hemorrhoid. Plan is for preparation H and topical dibucaine for pain relief Advised to return to clinic or present to ER if symptoms change or worsen otherwise follow up with pcp. patient verbalized understanding and agrees with plan.      Orders and Diagnoses  Diagnoses and all orders for this visit:  Dysuria  -     POCT UA Automated manually resulted  -     sulfamethoxazole-trimethoprim (Bactrim DS) 800-160 mg tablet; Take 1 tablet by mouth 2 times a day for 7 days.  -     Urine Culture  Acute hemorrhoid  -     phenylephrine 0.25 % suppository; Insert 1 suppository into the rectum 2 times a day for 10 days.  -     dibucaine (Nupercainal) 1 % ointment; Apply topically if needed for hemorrhoids for up to 10 days.      Medical Admin Record      Patient disposition: Home    Electronically signed by Erika Hernandez PA-C  4:49 PM           [1] (Not in a hospital admission)   [2]   Past Medical History:  Diagnosis Date    Arrhythmia     Atrial fibrillation (Multi) 01/01/2011    Hyperlipidemia 01/01/1984   [3]   Past Surgical History:  Procedure Laterality Date    CARDIAC ELECTROPHYSIOLOGY PROCEDURE N/A 12/6/2024    Procedure: Cardioversion;  Surgeon: Shine Hernandez MD;  Location: [place] [address] Cardiac Cath Lab;  Service: Electrophysiology;  Laterality: N/A;  Needs DAYANARA prior to cardioversion    CARDIAC ELECTROPHYSIOLOGY PROCEDURE N/A 6/6/2025     Procedure: Ablation A-Fib Persistant;  Surgeon: Shine Hernandez MD;  Location: [place] [address] Cardiac Cath Lab;  Service: Electrophysiology;  Laterality: N/A;  CARTO. He needs to hold the Eliquis the day before

## 2025-07-10 LAB — BACTERIA UR CULT: NORMAL

## 2025-07-11 ENCOUNTER — APPOINTMENT (OUTPATIENT)
Dept: RADIOLOGY | Facility: CLINIC | Age: 65
End: 2025-07-11
Payer: MEDICARE

## 2025-07-23 LAB
ANION GAP SERPL CALCULATED.4IONS-SCNC: 10 MMOL/L (CALC) (ref 7–17)
BUN SERPL-MCNC: 14 MG/DL (ref 7–25)
BUN/CREAT SERPL: ABNORMAL (CALC) (ref 6–22)
CALCIUM SERPL-MCNC: 8.5 MG/DL (ref 8.6–10.3)
CHLORIDE SERPL-SCNC: 105 MMOL/L (ref 98–110)
CO2 SERPL-SCNC: 24 MMOL/L (ref 20–32)
CREAT SERPL-MCNC: 1.03 MG/DL (ref 0.7–1.35)
EGFRCR SERPLBLD CKD-EPI 2021: 81 ML/MIN/1.73M2
GLUCOSE SERPL-MCNC: 117 MG/DL (ref 65–139)
POTASSIUM SERPL-SCNC: 4.4 MMOL/L (ref 3.5–5.3)
SODIUM SERPL-SCNC: 139 MMOL/L (ref 135–146)

## 2025-07-24 ENCOUNTER — HOSPITAL ENCOUNTER (OUTPATIENT)
Dept: RADIOLOGY | Facility: HOSPITAL | Age: 65
Discharge: HOME | End: 2025-07-24
Payer: MEDICARE

## 2025-07-24 VITALS — OXYGEN SATURATION: 100 % | DIASTOLIC BLOOD PRESSURE: 58 MMHG | SYSTOLIC BLOOD PRESSURE: 100 MMHG | HEART RATE: 40 BPM

## 2025-07-24 DIAGNOSIS — R93.1 ABNORMAL FINDINGS ON DIAGNOSTIC IMAGING OF HEART AND CORONARY CIRCULATION: ICD-10-CM

## 2025-07-24 DIAGNOSIS — I20.89 ANGINA OF EFFORT: ICD-10-CM

## 2025-07-24 LAB
ATRIAL RATE: 53 BPM
P AXIS: 56 DEGREES
P OFFSET: 198 MS
P ONSET: 130 MS
PR INTERVAL: 180 MS
Q ONSET: 220 MS
QRS COUNT: 8 BEATS
QRS DURATION: 92 MS
QT INTERVAL: 486 MS
QTC CALCULATION(BAZETT): 456 MS
QTC FREDERICIA: 466 MS
R AXIS: 86 DEGREES
T AXIS: 22 DEGREES
T OFFSET: 463 MS
VENTRICULAR RATE: 53 BPM

## 2025-07-24 PROCEDURE — 75574 CT ANGIO HRT W/3D IMAGE: CPT | Performed by: INTERNAL MEDICINE

## 2025-07-24 PROCEDURE — 2550000001 HC RX 255 CONTRASTS: Performed by: INTERNAL MEDICINE

## 2025-07-24 PROCEDURE — 75574 CT ANGIO HRT W/3D IMAGE: CPT

## 2025-07-24 PROCEDURE — 75580 N-INVAS EST C FFR SW ALY CTA: CPT

## 2025-07-24 PROCEDURE — 75580 N-INVAS EST C FFR SW ALY CTA: CPT | Performed by: INTERNAL MEDICINE

## 2025-07-24 PROCEDURE — 2500000001 HC RX 250 WO HCPCS SELF ADMINISTERED DRUGS (ALT 637 FOR MEDICARE OP): Performed by: INTERNAL MEDICINE

## 2025-07-24 RX ORDER — METOPROLOL TARTRATE 100 MG/1
100 TABLET ORAL ONCE AS NEEDED
Status: DISCONTINUED | OUTPATIENT
Start: 2025-07-24 | End: 2025-07-25 | Stop reason: HOSPADM

## 2025-07-24 RX ORDER — METOPROLOL TARTRATE 1 MG/ML
5 INJECTION, SOLUTION INTRAVENOUS ONCE AS NEEDED
Status: DISCONTINUED | OUTPATIENT
Start: 2025-07-24 | End: 2025-07-25 | Stop reason: HOSPADM

## 2025-07-24 RX ORDER — METOPROLOL TARTRATE 100 MG/1
100 TABLET ORAL ONCE
Status: DISCONTINUED | OUTPATIENT
Start: 2025-07-24 | End: 2025-07-25 | Stop reason: HOSPADM

## 2025-07-24 RX ORDER — NITROGLYCERIN 0.4 MG/1
0.8 TABLET SUBLINGUAL ONCE
Status: COMPLETED | OUTPATIENT
Start: 2025-07-24 | End: 2025-07-24

## 2025-07-24 RX ORDER — METOPROLOL TARTRATE 1 MG/ML
5 INJECTION, SOLUTION INTRAVENOUS ONCE
Status: DISCONTINUED | OUTPATIENT
Start: 2025-07-24 | End: 2025-07-25 | Stop reason: HOSPADM

## 2025-07-24 RX ADMIN — NITROGLYCERIN 0.8 MG: 0.4 TABLET SUBLINGUAL at 08:38

## 2025-07-24 RX ADMIN — IOHEXOL 80 ML: 350 INJECTION, SOLUTION INTRAVENOUS at 08:45

## 2025-07-24 ASSESSMENT — PAIN - FUNCTIONAL ASSESSMENT: PAIN_FUNCTIONAL_ASSESSMENT: 0-10

## 2025-07-24 ASSESSMENT — PAIN SCALES - GENERAL: PAINLEVEL_OUTOF10: 0 - NO PAIN

## 2025-07-25 ENCOUNTER — HOSPITAL ENCOUNTER (EMERGENCY)
Age: 65
Discharge: ANOTHER HOSPITAL | End: 2025-07-25
Attending: EMERGENCY MEDICINE
Payer: MEDICARE

## 2025-07-25 ENCOUNTER — TELEPHONE (OUTPATIENT)
Dept: CARDIOLOGY | Facility: HOSPITAL | Age: 65
End: 2025-07-25
Payer: MEDICARE

## 2025-07-25 ENCOUNTER — APPOINTMENT (OUTPATIENT)
Dept: GENERAL RADIOLOGY | Age: 65
End: 2025-07-25
Payer: MEDICARE

## 2025-07-25 ENCOUNTER — HOSPITAL ENCOUNTER (INPATIENT)
Facility: HOSPITAL | Age: 65
DRG: 303 | End: 2025-07-25
Attending: INTERNAL MEDICINE | Admitting: INTERNAL MEDICINE
Payer: MEDICARE

## 2025-07-25 VITALS
SYSTOLIC BLOOD PRESSURE: 135 MMHG | DIASTOLIC BLOOD PRESSURE: 64 MMHG | WEIGHT: 190 LBS | TEMPERATURE: 98 F | HEIGHT: 71 IN | RESPIRATION RATE: 16 BRPM | HEART RATE: 45 BPM | OXYGEN SATURATION: 98 % | BODY MASS INDEX: 26.6 KG/M2

## 2025-07-25 DIAGNOSIS — R07.9 CHEST PAIN, UNSPECIFIED: ICD-10-CM

## 2025-07-25 DIAGNOSIS — R07.9 ACUTE CHEST PAIN: ICD-10-CM

## 2025-07-25 DIAGNOSIS — I20.0 UNSTABLE ANGINA (MULTI): Primary | ICD-10-CM

## 2025-07-25 DIAGNOSIS — I25.110 ATHEROSCLEROSIS OF NATIVE CORONARY ARTERY OF NATIVE HEART WITH UNSTABLE ANGINA PECTORIS: Primary | ICD-10-CM

## 2025-07-25 DIAGNOSIS — I25.10 ATHEROSCLEROTIC HEART DISEASE OF NATIVE CORONARY ARTERY WITHOUT ANGINA PECTORIS: ICD-10-CM

## 2025-07-25 DIAGNOSIS — I20.0 UNSTABLE ANGINA (HCC): Primary | ICD-10-CM

## 2025-07-25 LAB
ALBUMIN SERPL-MCNC: 4.1 G/DL (ref 3.5–5.2)
ALP SERPL-CCNC: 58 U/L (ref 40–129)
ALT SERPL-CCNC: 15 U/L (ref 0–50)
ANION GAP SERPL CALCULATED.3IONS-SCNC: 9 MMOL/L (ref 7–16)
AST SERPL-CCNC: 18 U/L (ref 0–50)
BASOPHILS # BLD: 0.02 K/UL (ref 0–0.2)
BASOPHILS NFR BLD: 0 % (ref 0–2)
BILIRUB SERPL-MCNC: 0.4 MG/DL (ref 0–1.2)
BNP SERPL-MCNC: 673 PG/ML (ref 0–125)
BUN SERPL-MCNC: 10 MG/DL (ref 8–23)
CALCIUM SERPL-MCNC: 8.9 MG/DL (ref 8.8–10.2)
CHLORIDE SERPL-SCNC: 103 MMOL/L (ref 98–107)
CO2 SERPL-SCNC: 27 MMOL/L (ref 22–29)
CREAT SERPL-MCNC: 1 MG/DL (ref 0.7–1.2)
EOSINOPHIL # BLD: 0.2 K/UL (ref 0.05–0.5)
EOSINOPHILS RELATIVE PERCENT: 3 % (ref 0–6)
ERYTHROCYTE [DISTWIDTH] IN BLOOD BY AUTOMATED COUNT: 13.9 % (ref 11.5–15)
GFR, ESTIMATED: 88 ML/MIN/1.73M2
GLUCOSE SERPL-MCNC: 114 MG/DL (ref 74–99)
HCT VFR BLD AUTO: 36.7 % (ref 37–54)
HGB BLD-MCNC: 12.1 G/DL (ref 12.5–16.5)
IMM GRANULOCYTES # BLD AUTO: <0.03 K/UL (ref 0–0.58)
IMM GRANULOCYTES NFR BLD: 0 % (ref 0–5)
LYMPHOCYTES NFR BLD: 0.65 K/UL (ref 1.5–4)
LYMPHOCYTES RELATIVE PERCENT: 11 % (ref 20–42)
MAGNESIUM SERPL-MCNC: 2.2 MG/DL (ref 1.6–2.4)
MCH RBC QN AUTO: 27.1 PG (ref 26–35)
MCHC RBC AUTO-ENTMCNC: 33 G/DL (ref 32–34.5)
MCV RBC AUTO: 82.1 FL (ref 80–99.9)
MONOCYTES NFR BLD: 0.56 K/UL (ref 0.1–0.95)
MONOCYTES NFR BLD: 9 % (ref 2–12)
NEUTROPHILS NFR BLD: 76 % (ref 43–80)
NEUTS SEG NFR BLD: 4.58 K/UL (ref 1.8–7.3)
PLATELET # BLD AUTO: 194 K/UL (ref 130–450)
PMV BLD AUTO: 10.4 FL (ref 7–12)
POTASSIUM SERPL-SCNC: 4.9 MMOL/L (ref 3.5–5.1)
PROT SERPL-MCNC: 6.2 G/DL (ref 6.4–8.3)
RBC # BLD AUTO: 4.47 M/UL (ref 3.8–5.8)
SODIUM SERPL-SCNC: 139 MMOL/L (ref 136–145)
TROPONIN I SERPL HS-MCNC: 10 NG/L (ref 0–22)
TROPONIN I SERPL HS-MCNC: 8 NG/L (ref 0–22)
WBC OTHER # BLD: 6 K/UL (ref 4.5–11.5)

## 2025-07-25 PROCEDURE — 71045 X-RAY EXAM CHEST 1 VIEW: CPT

## 2025-07-25 PROCEDURE — 83880 ASSAY OF NATRIURETIC PEPTIDE: CPT

## 2025-07-25 PROCEDURE — 83735 ASSAY OF MAGNESIUM: CPT

## 2025-07-25 PROCEDURE — 99285 EMERGENCY DEPT VISIT HI MDM: CPT

## 2025-07-25 PROCEDURE — 2060000001 HC INTERMEDIATE ICU ROOM DAILY

## 2025-07-25 PROCEDURE — 85025 COMPLETE CBC W/AUTO DIFF WBC: CPT

## 2025-07-25 PROCEDURE — 80053 COMPREHEN METABOLIC PANEL: CPT

## 2025-07-25 PROCEDURE — 6370000000 HC RX 637 (ALT 250 FOR IP): Performed by: EMERGENCY MEDICINE

## 2025-07-25 PROCEDURE — 84484 ASSAY OF TROPONIN QUANT: CPT

## 2025-07-25 RX ORDER — ASPIRIN 81 MG/1
243 TABLET, CHEWABLE ORAL ONCE
Status: COMPLETED | OUTPATIENT
Start: 2025-07-25 | End: 2025-07-25

## 2025-07-25 RX ORDER — NITROGLYCERIN 0.4 MG/1
0.4 TABLET SUBLINGUAL EVERY 5 MIN PRN
Qty: 50 TABLET | Refills: 3 | Status: ON HOLD | OUTPATIENT
Start: 2025-07-25 | End: 2026-07-25

## 2025-07-25 RX ORDER — NAPROXEN SODIUM 220 MG/1
81 TABLET, FILM COATED ORAL DAILY
Status: DISCONTINUED | OUTPATIENT
Start: 2025-07-26 | End: 2025-07-29 | Stop reason: HOSPADM

## 2025-07-25 RX ORDER — TALC
6 POWDER (GRAM) TOPICAL NIGHTLY
Status: DISCONTINUED | OUTPATIENT
Start: 2025-07-25 | End: 2025-07-29 | Stop reason: HOSPADM

## 2025-07-25 RX ORDER — ONDANSETRON HYDROCHLORIDE 2 MG/ML
4 INJECTION, SOLUTION INTRAVENOUS EVERY 8 HOURS PRN
Status: DISCONTINUED | OUTPATIENT
Start: 2025-07-25 | End: 2025-07-29 | Stop reason: HOSPADM

## 2025-07-25 RX ORDER — ACETAMINOPHEN 325 MG/1
650 TABLET ORAL EVERY 4 HOURS PRN
Status: DISCONTINUED | OUTPATIENT
Start: 2025-07-25 | End: 2025-07-29 | Stop reason: HOSPADM

## 2025-07-25 RX ORDER — PANTOPRAZOLE SODIUM 40 MG/10ML
40 INJECTION, POWDER, LYOPHILIZED, FOR SOLUTION INTRAVENOUS
Status: DISCONTINUED | OUTPATIENT
Start: 2025-07-26 | End: 2025-07-29 | Stop reason: HOSPADM

## 2025-07-25 RX ORDER — METOPROLOL SUCCINATE 50 MG/1
50 TABLET, EXTENDED RELEASE ORAL DAILY
Status: DISCONTINUED | OUTPATIENT
Start: 2025-07-26 | End: 2025-07-28

## 2025-07-25 RX ORDER — AMLODIPINE BESYLATE 2.5 MG/1
2.5 TABLET ORAL DAILY
Status: DISCONTINUED | OUTPATIENT
Start: 2025-07-25 | End: 2025-07-25

## 2025-07-25 RX ORDER — ACETAMINOPHEN 160 MG/5ML
650 SOLUTION ORAL EVERY 4 HOURS PRN
Status: DISCONTINUED | OUTPATIENT
Start: 2025-07-25 | End: 2025-07-29 | Stop reason: HOSPADM

## 2025-07-25 RX ORDER — MORPHINE SULFATE 4 MG/ML
4 INJECTION, SOLUTION INTRAMUSCULAR; INTRAVENOUS EVERY 4 HOURS PRN
Status: DISCONTINUED | OUTPATIENT
Start: 2025-07-25 | End: 2025-07-29 | Stop reason: HOSPADM

## 2025-07-25 RX ORDER — PANTOPRAZOLE SODIUM 40 MG/1
40 TABLET, DELAYED RELEASE ORAL
Status: DISCONTINUED | OUTPATIENT
Start: 2025-07-26 | End: 2025-07-29 | Stop reason: HOSPADM

## 2025-07-25 RX ORDER — MORPHINE SULFATE 2 MG/ML
2 INJECTION, SOLUTION INTRAMUSCULAR; INTRAVENOUS EVERY 4 HOURS PRN
Status: DISCONTINUED | OUTPATIENT
Start: 2025-07-25 | End: 2025-07-29 | Stop reason: HOSPADM

## 2025-07-25 RX ORDER — METOPROLOL SUCCINATE 50 MG/1
200 TABLET, EXTENDED RELEASE ORAL DAILY
Status: DISCONTINUED | OUTPATIENT
Start: 2025-07-26 | End: 2025-07-25

## 2025-07-25 RX ORDER — ROSUVASTATIN CALCIUM 20 MG/1
40 TABLET, COATED ORAL DAILY
Status: DISCONTINUED | OUTPATIENT
Start: 2025-07-26 | End: 2025-07-29 | Stop reason: HOSPADM

## 2025-07-25 RX ORDER — NITROGLYCERIN 0.4 MG/1
0.4 TABLET SUBLINGUAL EVERY 5 MIN PRN
Status: DISCONTINUED | OUTPATIENT
Start: 2025-07-25 | End: 2025-07-29 | Stop reason: HOSPADM

## 2025-07-25 RX ORDER — AMLODIPINE BESYLATE 2.5 MG/1
2.5 TABLET ORAL DAILY
Status: DISCONTINUED | OUTPATIENT
Start: 2025-07-26 | End: 2025-07-29 | Stop reason: HOSPADM

## 2025-07-25 RX ORDER — ACETAMINOPHEN 650 MG/1
650 SUPPOSITORY RECTAL EVERY 4 HOURS PRN
Status: DISCONTINUED | OUTPATIENT
Start: 2025-07-25 | End: 2025-07-29 | Stop reason: HOSPADM

## 2025-07-25 RX ORDER — ONDANSETRON 4 MG/1
4 TABLET, FILM COATED ORAL EVERY 8 HOURS PRN
Status: DISCONTINUED | OUTPATIENT
Start: 2025-07-25 | End: 2025-07-29 | Stop reason: HOSPADM

## 2025-07-25 RX ADMIN — NITROGLYCERIN 0.5 INCH: 20 OINTMENT TOPICAL at 14:08

## 2025-07-25 RX ADMIN — ASPIRIN 243 MG: 81 TABLET, CHEWABLE ORAL at 12:54

## 2025-07-25 SDOH — SOCIAL STABILITY: SOCIAL INSECURITY
WITHIN THE LAST YEAR, HAVE YOU BEEN RAPED OR FORCED TO HAVE ANY KIND OF SEXUAL ACTIVITY BY YOUR PARTNER OR EX-PARTNER?: NO

## 2025-07-25 SDOH — ECONOMIC STABILITY: INCOME INSECURITY: IN THE PAST 12 MONTHS HAS THE ELECTRIC, GAS, OIL, OR WATER COMPANY THREATENED TO SHUT OFF SERVICES IN YOUR HOME?: NO

## 2025-07-25 SDOH — SOCIAL STABILITY: SOCIAL INSECURITY: HAS ANYONE EVER THREATENED TO HURT YOUR FAMILY OR YOUR PETS?: NO

## 2025-07-25 SDOH — SOCIAL STABILITY: SOCIAL INSECURITY: WITHIN THE LAST YEAR, HAVE YOU BEEN HUMILIATED OR EMOTIONALLY ABUSED IN OTHER WAYS BY YOUR PARTNER OR EX-PARTNER?: NO

## 2025-07-25 SDOH — SOCIAL STABILITY: SOCIAL INSECURITY: DOES ANYONE TRY TO KEEP YOU FROM HAVING/CONTACTING OTHER FRIENDS OR DOING THINGS OUTSIDE YOUR HOME?: NO

## 2025-07-25 SDOH — SOCIAL STABILITY: SOCIAL INSECURITY: DO YOU FEEL ANYONE HAS EXPLOITED OR TAKEN ADVANTAGE OF YOU FINANCIALLY OR OF YOUR PERSONAL PROPERTY?: NO

## 2025-07-25 SDOH — ECONOMIC STABILITY: FOOD INSECURITY: WITHIN THE PAST 12 MONTHS, YOU WORRIED THAT YOUR FOOD WOULD RUN OUT BEFORE YOU GOT THE MONEY TO BUY MORE.: NEVER TRUE

## 2025-07-25 SDOH — ECONOMIC STABILITY: FOOD INSECURITY: WITHIN THE PAST 12 MONTHS, THE FOOD YOU BOUGHT JUST DIDN'T LAST AND YOU DIDN'T HAVE MONEY TO GET MORE.: NEVER TRUE

## 2025-07-25 SDOH — SOCIAL STABILITY: SOCIAL INSECURITY: ARE YOU OR HAVE YOU BEEN THREATENED OR ABUSED PHYSICALLY, EMOTIONALLY, OR SEXUALLY BY ANYONE?: NO

## 2025-07-25 SDOH — SOCIAL STABILITY: SOCIAL INSECURITY: WITHIN THE LAST YEAR, HAVE YOU BEEN AFRAID OF YOUR PARTNER OR EX-PARTNER?: NO

## 2025-07-25 SDOH — SOCIAL STABILITY: SOCIAL INSECURITY: ABUSE: ADULT

## 2025-07-25 SDOH — SOCIAL STABILITY: SOCIAL INSECURITY: WERE YOU ABLE TO COMPLETE ALL THE BEHAVIORAL HEALTH SCREENINGS?: YES

## 2025-07-25 SDOH — SOCIAL STABILITY: SOCIAL INSECURITY: HAVE YOU HAD ANY THOUGHTS OF HARMING ANYONE ELSE?: NO

## 2025-07-25 SDOH — SOCIAL STABILITY: SOCIAL INSECURITY: DO YOU FEEL UNSAFE GOING BACK TO THE PLACE WHERE YOU ARE LIVING?: NO

## 2025-07-25 SDOH — SOCIAL STABILITY: SOCIAL INSECURITY: HAVE YOU HAD THOUGHTS OF HARMING ANYONE ELSE?: NO

## 2025-07-25 SDOH — SOCIAL STABILITY: SOCIAL INSECURITY: ARE THERE ANY APPARENT SIGNS OF INJURIES/BEHAVIORS THAT COULD BE RELATED TO ABUSE/NEGLECT?: NO

## 2025-07-25 ASSESSMENT — LIFESTYLE VARIABLES
HOW MANY STANDARD DRINKS CONTAINING ALCOHOL DO YOU HAVE ON A TYPICAL DAY: 1 OR 2
AUDIT-C TOTAL SCORE: 2
HOW OFTEN DO YOU HAVE A DRINK CONTAINING ALCOHOL: 2-4 TIMES A MONTH
AUDIT-C TOTAL SCORE: 2
HOW OFTEN DO YOU HAVE 6 OR MORE DRINKS ON ONE OCCASION: NEVER
SKIP TO QUESTIONS 9-10: 1

## 2025-07-25 ASSESSMENT — ENCOUNTER SYMPTOMS
ACTIVITY CHANGE: 1
GASTROINTESTINAL NEGATIVE: 1
APPETITE CHANGE: 1
ENDOCRINE NEGATIVE: 1
RESPIRATORY NEGATIVE: 1
NEUROLOGICAL NEGATIVE: 1
MUSCULOSKELETAL NEGATIVE: 1
EYES NEGATIVE: 1
FATIGUE: 1
PSYCHIATRIC NEGATIVE: 1
HEMATOLOGIC/LYMPHATIC NEGATIVE: 1
ALLERGIC/IMMUNOLOGIC NEGATIVE: 1

## 2025-07-25 ASSESSMENT — COGNITIVE AND FUNCTIONAL STATUS - GENERAL
PATIENT BASELINE BEDBOUND: NO
MOBILITY SCORE: 24
DAILY ACTIVITIY SCORE: 24

## 2025-07-25 ASSESSMENT — ACTIVITIES OF DAILY LIVING (ADL)
WALKS IN HOME: INDEPENDENT
TOILETING: INDEPENDENT
HEARING - RIGHT EAR: FUNCTIONAL
PATIENT'S MEMORY ADEQUATE TO SAFELY COMPLETE DAILY ACTIVITIES?: YES
JUDGMENT_ADEQUATE_SAFELY_COMPLETE_DAILY_ACTIVITIES: YES
GROOMING: INDEPENDENT
BATHING: INDEPENDENT
DRESSING YOURSELF: INDEPENDENT
HEARING - LEFT EAR: FUNCTIONAL
ASSISTIVE_DEVICE: EYEGLASSES
FEEDING YOURSELF: INDEPENDENT
ADEQUATE_TO_COMPLETE_ADL: YES
LACK_OF_TRANSPORTATION: NO

## 2025-07-25 ASSESSMENT — PAIN - FUNCTIONAL ASSESSMENT
PAIN_FUNCTIONAL_ASSESSMENT: NONE - DENIES PAIN
PAIN_FUNCTIONAL_ASSESSMENT: NONE - DENIES PAIN
PAIN_FUNCTIONAL_ASSESSMENT: 0-10
PAIN_FUNCTIONAL_ASSESSMENT: ACTIVITIES ARE NOT PREVENTED
PAIN_FUNCTIONAL_ASSESSMENT: NONE - DENIES PAIN
PAIN_FUNCTIONAL_ASSESSMENT: 0-10

## 2025-07-25 ASSESSMENT — PAIN DESCRIPTION - ONSET: ONSET: ON-GOING

## 2025-07-25 ASSESSMENT — PATIENT HEALTH QUESTIONNAIRE - PHQ9
1. LITTLE INTEREST OR PLEASURE IN DOING THINGS: NOT AT ALL
SUM OF ALL RESPONSES TO PHQ9 QUESTIONS 1 & 2: 0
2. FEELING DOWN, DEPRESSED OR HOPELESS: NOT AT ALL

## 2025-07-25 ASSESSMENT — PAIN DESCRIPTION - LOCATION: LOCATION: CHEST

## 2025-07-25 ASSESSMENT — PAIN SCALES - GENERAL
PAINLEVEL_OUTOF10: 0
PAINLEVEL_OUTOF10: 0
PAINLEVEL_OUTOF10: 2
PAINLEVEL_OUTOF10: 0 - NO PAIN

## 2025-07-25 ASSESSMENT — PAIN DESCRIPTION - ORIENTATION: ORIENTATION: RIGHT;LEFT;MID

## 2025-07-25 ASSESSMENT — PAIN DESCRIPTION - PAIN TYPE: TYPE: ACUTE PAIN

## 2025-07-25 ASSESSMENT — PAIN DESCRIPTION - FREQUENCY: FREQUENCY: CONTINUOUS

## 2025-07-25 ASSESSMENT — PAIN DESCRIPTION - DESCRIPTORS: DESCRIPTORS: ACHING

## 2025-07-25 NOTE — TELEPHONE ENCOUNTER
Spoke to the patient regarding results of cCTA with heart flow. Pt has significant stenosis in LAD and ramus intermedius ( both FFR positive. ) Was going to add aspirin to medical regimen ( already on metoprolol and rosuvastatin. ) Was also going to call in rx for SL nitroglycerine. In speaking with the patient he mentioned he has had chest tightness/heaviness since awakening this AM ( hours ) though he felt it was not Pain and did not bother him. I stressed the importance of getting evaluated at the ER given these symptoms in light of the abnormal cCTA. He lives near [place] and has agreed to go to the Er. He will give me follow up. He will need left heart cath. If he rules out for MI and is stable and discharged to home, can arrange in an expedited fashion at [facility] or [facility].

## 2025-07-25 NOTE — ED PROVIDER NOTES
Department of Emergency Medicine    ED  Provider Note - Sign out / Oncoming Provider   Admit Date/RoomTime: 7/25/2025 12:10 PM  4:58 PM EDT      I received this patient at sign out from Dr. Rader.  I have discussed the patient's initial exam, treatment and plan of care with the out going physician.  I have introduced my self to the patient / family and have answered their questions to this point.  I have examined the patient myself and reviewed ordered tests / medications and  reviewed any available results to this point.   If a resident is involved in the Emergency Department care, I have discussed my findings and plan with them as well.    MDM:     I, Dr. Navas am the primary provider of record    Patient was signed out pending consult to cardiology.  Spoke with Dr. Marti Frey, notes that she was able to get a hold of interventional cardiology, Dr. José Miguel Reece.  Notes that they would not really be able to expedite much in regards to the cardiac catheterization possibly Tuesday outpatient however this would need to go through insurance as well as the patient is on anticoagulation.  More concern for the possibility of unstable angina as the patient had discomfort at rest today.  Ultimately recommended transfer to the [facility] for further evaluation and treatment.    ED Course as of 07/27/25 1504   Fri Jul 25, 2025   4909 Spoke with Dr. Paulino hospitalist; discussed the patient. Will accept transfer [BB]      ED Course User Index  [BB] Werner Navas DO        --------------------------------- IMPRESSION AND DISPOSITION ---------------------------------    IMPRESSION  1. Unstable angina (HCC)        DISPOSITION  Disposition: Transfer to [facility]  Patient condition is stable         Werner Navas DO  07/27/25 0618

## 2025-07-25 NOTE — ED PROVIDER NOTES
[facility] EMERGENCY DEPARTMENT  EMERGENCY DEPARTMENT ENCOUNTER        Pt Name: Clifton Whyte  MRN: [id number]  Birthdate 1960  Date of evaluation: 7/25/2025  Provider: Bj Rader DO  PCP: Black Wise MD  Note Started: 1:02 PM EDT 7/25/25    CHIEF COMPLAINT       Chief Complaint   Patient presents with    Chest Pain     This am since 700am developed chest pressure lasting approx 4 hrs       HISTORY OF PRESENT ILLNESS: 1 or more Elements   History From: Patient, EMR     Limitations to history : None    Clifton Whyte is a 65 y.o. male who presents chest discomfort.  Recently underwent ablation at [facility], had CT angiography performed yesterday.  Was called today and mention he had been having some chest discomfort today last approximately 4 hours while sitting at his computer.  Describes a tightness and a pressure across his chest.  Has been noticing his with light exertion.  Denies chest pain now fevers chills nausea vomiting.  States he gets his cardiology care at [facility], states good compliance with his anticoagulation    Nursing Notes were all reviewed and agreed with or any disagreements were addressed in the HPI.      REVIEW OF EXTERNAL NOTE :       CT angiography performed at [facility] yesterday:   Impression    1. Overall moderate to severe coronary artery disease in a  right-dominant system.  2. Left main up to 25%.  3. The ostial and proximal LAD is heavily calcified with elements of  eccentric noncalcified complex plaque with stenosis between 50-70.  There is diminution in contrast the mid LAD appears to be patent.  Opacification of the distal LAD likely consistent with a significant  proximal stenosis.  4. Large ramus intermedius is calcified with ostial proximal stenosis  of greater than 70%.  5. Minimal nonobstructive disease in the RCA and LCX.  6. Elevated coronary artery calcium score of 437*.  7. Images were

## 2025-07-26 ENCOUNTER — APPOINTMENT (OUTPATIENT)
Dept: CARDIOLOGY | Facility: HOSPITAL | Age: 65
DRG: 303 | End: 2025-07-26
Payer: MEDICARE

## 2025-07-26 LAB
ANION GAP SERPL CALC-SCNC: 8 MMOL/L (ref 10–20)
AORTIC VALVE MEAN GRADIENT: 4 MMHG
AORTIC VALVE PEAK VELOCITY: 1.43 M/S
AV PEAK GRADIENT: 8 MMHG
AVA (PEAK VEL): 2.61 CM2
AVA (VTI): 2.63 CM2
BUN SERPL-MCNC: 11 MG/DL (ref 6–23)
CALCIUM SERPL-MCNC: 8.7 MG/DL (ref 8.6–10.3)
CARDIAC TROPONIN I PNL SERPL HS: 8 NG/L (ref 0–20)
CHLORIDE SERPL-SCNC: 106 MMOL/L (ref 98–107)
CO2 SERPL-SCNC: 30 MMOL/L (ref 21–32)
CREAT SERPL-MCNC: 0.81 MG/DL (ref 0.5–1.3)
EGFRCR SERPLBLD CKD-EPI 2021: >90 ML/MIN/1.73M*2
EJECTION FRACTION APICAL 4 CHAMBER: 66.2
EJECTION FRACTION: 64 %
ERYTHROCYTE [DISTWIDTH] IN BLOOD BY AUTOMATED COUNT: 14 % (ref 11.5–14.5)
GLUCOSE SERPL-MCNC: 110 MG/DL (ref 74–99)
HCT VFR BLD AUTO: 37.5 % (ref 41–52)
HGB BLD-MCNC: 12 G/DL (ref 13.5–17.5)
LEFT ATRIUM VOLUME AREA LENGTH INDEX BSA: 36.4 ML/M2
LEFT VENTRICLE INTERNAL DIMENSION DIASTOLE: 4.42 CM (ref 3.5–6)
LEFT VENTRICULAR OUTFLOW TRACT DIAMETER: 2.01 CM
MAGNESIUM SERPL-MCNC: 2.15 MG/DL (ref 1.6–2.4)
MCH RBC QN AUTO: 26.9 PG (ref 26–34)
MCHC RBC AUTO-ENTMCNC: 32 G/DL (ref 32–36)
MCV RBC AUTO: 84 FL (ref 80–100)
MITRAL VALVE E/A RATIO: 2.95
NRBC BLD-RTO: 0 /100 WBCS (ref 0–0)
PLATELET # BLD AUTO: 181 X10*3/UL (ref 150–450)
POTASSIUM SERPL-SCNC: 3.8 MMOL/L (ref 3.5–5.3)
RBC # BLD AUTO: 4.46 X10*6/UL (ref 4.5–5.9)
RIGHT VENTRICLE FREE WALL PEAK S': 16 CM/S
RIGHT VENTRICLE PEAK SYSTOLIC PRESSURE: 35 MMHG
SODIUM SERPL-SCNC: 140 MMOL/L (ref 136–145)
TRICUSPID ANNULAR PLANE SYSTOLIC EXCURSION: 2.1 CM
WBC # BLD AUTO: 5.7 X10*3/UL (ref 4.4–11.3)

## 2025-07-26 PROCEDURE — C8929 TTE W OR WO FOL WCON,DOPPLER: HCPCS

## 2025-07-26 PROCEDURE — 84484 ASSAY OF TROPONIN QUANT: CPT

## 2025-07-26 PROCEDURE — 93005 ELECTROCARDIOGRAM TRACING: CPT

## 2025-07-26 PROCEDURE — 99222 1ST HOSP IP/OBS MODERATE 55: CPT | Performed by: INTERNAL MEDICINE

## 2025-07-26 PROCEDURE — 36415 COLL VENOUS BLD VENIPUNCTURE: CPT | Performed by: INTERNAL MEDICINE

## 2025-07-26 PROCEDURE — 93306 TTE W/DOPPLER COMPLETE: CPT | Performed by: INTERNAL MEDICINE

## 2025-07-26 PROCEDURE — 2500000005 HC RX 250 GENERAL PHARMACY W/O HCPCS: Performed by: INTERNAL MEDICINE

## 2025-07-26 PROCEDURE — 83735 ASSAY OF MAGNESIUM: CPT

## 2025-07-26 PROCEDURE — 80048 BASIC METABOLIC PNL TOTAL CA: CPT | Performed by: INTERNAL MEDICINE

## 2025-07-26 PROCEDURE — 2060000001 HC INTERMEDIATE ICU ROOM DAILY

## 2025-07-26 PROCEDURE — 2500000004 HC RX 250 GENERAL PHARMACY W/ HCPCS (ALT 636 FOR OP/ED)

## 2025-07-26 PROCEDURE — 2500000002 HC RX 250 W HCPCS SELF ADMINISTERED DRUGS (ALT 637 FOR MEDICARE OP, ALT 636 FOR OP/ED): Performed by: INTERNAL MEDICINE

## 2025-07-26 PROCEDURE — 2500000001 HC RX 250 WO HCPCS SELF ADMINISTERED DRUGS (ALT 637 FOR MEDICARE OP): Performed by: INTERNAL MEDICINE

## 2025-07-26 PROCEDURE — 85027 COMPLETE CBC AUTOMATED: CPT | Performed by: INTERNAL MEDICINE

## 2025-07-26 PROCEDURE — 99232 SBSQ HOSP IP/OBS MODERATE 35: CPT | Performed by: STUDENT IN AN ORGANIZED HEALTH CARE EDUCATION/TRAINING PROGRAM

## 2025-07-26 RX ADMIN — Medication 6 MG: at 22:07

## 2025-07-26 RX ADMIN — AMLODIPINE BESYLATE 2.5 MG: 2.5 TABLET ORAL at 09:47

## 2025-07-26 RX ADMIN — HUMAN ALBUMIN MICROSPHERES AND PERFLUTREN 0.5 ML: 10; .22 INJECTION, SOLUTION INTRAVENOUS at 12:46

## 2025-07-26 RX ADMIN — PSYLLIUM HUSK 1 PACKET: 3.4 POWDER ORAL at 09:47

## 2025-07-26 RX ADMIN — PANTOPRAZOLE SODIUM 40 MG: 40 TABLET, DELAYED RELEASE ORAL at 06:46

## 2025-07-26 RX ADMIN — ROSUVASTATIN 40 MG: 20 TABLET, FILM COATED ORAL at 09:47

## 2025-07-26 RX ADMIN — Medication 6 MG: at 00:19

## 2025-07-26 RX ADMIN — APIXABAN 5 MG: 5 TABLET, FILM COATED ORAL at 00:19

## 2025-07-26 RX ADMIN — ASPIRIN 81 MG: 81 TABLET, CHEWABLE ORAL at 09:47

## 2025-07-26 RX ADMIN — APIXABAN 5 MG: 5 TABLET, FILM COATED ORAL at 09:47

## 2025-07-26 ASSESSMENT — COGNITIVE AND FUNCTIONAL STATUS - GENERAL
MOBILITY SCORE: 24
DAILY ACTIVITIY SCORE: 24
MOBILITY SCORE: 24
DAILY ACTIVITIY SCORE: 24

## 2025-07-26 ASSESSMENT — PAIN SCALES - GENERAL
PAINLEVEL_OUTOF10: 0 - NO PAIN

## 2025-07-26 ASSESSMENT — PAIN - FUNCTIONAL ASSESSMENT
PAIN_FUNCTIONAL_ASSESSMENT: 0-10

## 2025-07-26 NOTE — CARE PLAN
The patient's goals for the shift include Pt. will have a safe, restful and uneventful evening    The clinical goals for the shift include Pt. will remain HDS this shift      Problem: Pain - Adult  Goal: Verbalizes/displays adequate comfort level or baseline comfort level  Outcome: Progressing  Flowsheets (Taken 7/25/2025 2331)  Verbalizes/displays adequate comfort level or baseline comfort level:   Encourage patient to monitor pain and request assistance   Assess pain using appropriate pain scale     Problem: Safety - Adult  Goal: Free from fall injury  Outcome: Progressing  Flowsheets (Taken 7/25/2025 2331)  Free from fall injury: Instruct family/caregiver on patient safety     Problem: Discharge Planning  Goal: Discharge to home or other facility with appropriate resources  Outcome: Progressing  Flowsheets (Taken 7/25/2025 2331)  Discharge to home or other facility with appropriate resources: Identify barriers to discharge with patient and caregiver     Problem: Chronic Conditions and Co-morbidities  Goal: Patient's chronic conditions and co-morbidity symptoms are monitored and maintained or improved  Outcome: Progressing  Flowsheets (Taken 7/25/2025 2331)  Care Plan - Patient's Chronic Conditions and Co-Morbidity Symptoms are Monitored and Maintained or Improved:   Monitor and assess patient's chronic conditions and comorbid symptoms for stability, deterioration, or improvement   Collaborate with multidisciplinary team to address chronic and comorbid conditions and prevent exacerbation or deterioration     Problem: Nutrition  Goal: Nutrient intake appropriate for maintaining nutritional needs  Outcome: Progressing

## 2025-07-26 NOTE — H&P (VIEW-ONLY)
Inpatient consult to Cardiology  Consult performed by: YASMANY Galeano-CNP  Consult ordered by: Srikanth Beach DO  Reason for consult: Unstable angina          History Of Present Illness:    Outpatient cardiologist Dr. Kenzie Aviles:  Eleno Kenney is a 65 y.o. male with a past medical history of permanent atrial fibrillation s/p PVI Ablation 6/6/25 on Eliquis, with Dr. Hernandez, hyperlipidemia, anxiety, GERD.  Patient presents to [facility] 7/25/25 from [facility] with chest heaviness.  He had an abnormal coronary CTA 7/24/25 and is planned for left heart cath Monday. Cardiology consulted for further evaluation of unstable angina.    Patient reports that he is here for further evaluation of coronary artery disease found on his coronary CT angiogram.  He states that he underwent testing d/t his long history of hyperlipidemia (no cardiac complaints).  He has discussed results with his outpatient cardiologist Dr. Aviles and is agreeable to proceeding with left heart cath this coming Monday.  He reports that prior to yesterday he was feeling to be in his normal state of health, but when he woke up yesterday morning, he had a tightness in his chest.  He felt that it was related to his GERD and did not think much of symptoms.  He states that he reported his symptoms to his medical provider and was advised to go to the ED for further evaluation.  He denies any associated symptoms of chest discomfort radiation to his extremities, neck, jaw or back.  He denies any dyspnea, LOPEZ, lower extremity swelling, weight gain, PND, orthopnea, nausea, vomiting, diaphoresis, dizziness, lightheadedness, falls or syncope.  He does report  for months, occasionally waking up with mild fluttering in his chest that is brief and resolves without intervention.  Currently, patient reports feeling comfortable at rest with no further symptoms of chest tightness.    In addition, patient underwent an atrial fibrillation  ablation 6/25.  He reports compliance with his metoprolol and Eliquis without missing doses.  He does report that about a week ago he noted that his heart rate was in the 40s and that for the last few weeks he has felt foggy at times.  After discovering that his heart rate was in the 40s, he wonders if the fogginess was related.  He denies any past medical history of cardiac surgeries or coronary percutaneous interventions.      Hospital course:  Initial EKG: Marked sinus bradycardia with frequent PVCs, HR 48 (EKG from 7/26/25, copy in floor chart)  Arrival vital signs: Afebrile 97.7, HR 46, /67, 96% on room air  Current vital signs: /53, HR 46, 95% on room air  Pertinent imaging/Labs:  OSH trop: 10/8, , K3.8, CR 0.81, WBC 5.7, Hgb 12.0, , Trop today 8, chest x-ray clear      Home CV meds:  Eliquis 5 mg p.o. twice daily, metoprolol  mg p.o. daily, rosuvastatin 40 mg p.o. daily      All other systems reviewed and negative unless as mentioned in HPI.       Past Medical/ Surgical History:  Permanent atrial fibrillation s/p PVI ablation 6/6/25  Hyperlipidemia  Anxiety  GERD      Social History:  Denies smoking  Occasional alcohol use  Denies illicit drug use     Family History:  Reviewed, not pertinent to presenting problem        Past Cardiology Tests:  Coronary CT angiogram with heart flow 7/24/25:  IMPRESSION:  1. Overall moderate to severe coronary artery disease in a  right-dominant system.  2. Left main up to 25%.  3. The ostial and proximal LAD is heavily calcified with elements of  eccentric noncalcified complex plaque with stenosis between 50-70.  There is diminution in contrast the mid LAD appears to be patent.  Opacification of the distal LAD likely consistent with a significant  proximal stenosis.  4. Large ramus intermedius is calcified with ostial proximal stenosis  of greater than 70%.  5. Minimal nonobstructive disease in the RCA and LCX.  6. Elevated coronary artery  "calcium score of 437*.  7. Images were sent for noninvasive CT fractional flow reserve (FFR)  calculation using HeartFlow technology. Results will be reported in  an addendum.  8. Dilated main pulmonary artery measuring 3.2 cm. Correlate  clinically with history of elevated right heart pressures.  9. Images were sent for measurement of CT fractional flow reserve  (FFR).    Echocardiogram 24:  CONCLUSIONS:   1. Left ventricular ejection fraction is normal, calculated by Churchill's biplane at 63%.   2. Left ventricular diastolic filling cannot be determined, due to atrial fibrillation/flutter.   3. There is normal right ventricular global systolic function.   4. The left atrium is mildly dilated.   5. Right ventricular systolic pressure is within normal limits.   6. No prior echocardiogram available for comparison.   7. The patient is in atrial fibrillation which may influence the estimate of left ventricular function and transvalvular flows.         Allergies:  Patient has no known allergies.    ROS:  10 point review of systems including (Constitutional, Eyes, ENMT, Respiratory, Cardiac, Gastrointestinal, Neurological, Psychiatric, and Hematologic) was performed and is otherwise negative.    Objective Data:  Last Recorded Vitals:  Vitals:    25 2335 25 0415   BP: 122/67 125/53   BP Location: Right arm Right arm   Patient Position: Lying Lying   Pulse: (!) 46 (!) 46   Resp: 18 17   Temp: 36.5 °C (97.7 °F) 36.4 °C (97.6 °F)   TempSrc: Temporal Temporal   SpO2: 96% 95%   Weight: 87.6 kg (193 lb 3.2 oz)    Height: 1.803 m (5' 11\")      Medical Gas Therapy: None (Room air)  Weight  Av.9 kg (191 lb 9.6 oz)  Min: 86.2 kg (190 lb)  Max: 87.6 kg (193 lb 3.2 oz)      LABS:  CMP:  Results from last 7 days   Lab Units 25  0609 25  0816   [facility] SODIUM mmol/L  --  139   SODIUM mmol/L 140  --    [facility] POTASSIUM mmol/L  --  4.4   POTASSIUM mmol/L 3.8  --    [facility] CHLORIDE mmol/L  --  105   CHLORIDE " mmol/L 106  --    [facility] CO2 mmol/L  --  24   CO2 mmol/L 30  --    [facility] ANION GAP mmol/L (calc)  --  10   ANION GAP mmol/L 8*  --    BUN mg/dL 11  --    [facility] BUN mg/dL  --  14   CREATININE mg/dL 0.81  --    [facility] CREATININE mg/dL  --  1.03   [facility] EGFR mL/min/1.73m2  --  81   EGFR mL/min/1.73m*2 >90  --      CBC:  Results from last 7 days   Lab Units 07/26/25  0609   WBC AUTO x10*3/uL 5.7   HEMOGLOBIN g/dL 12.0*   HEMATOCRIT % 37.5*   PLATELETS AUTO x10*3/uL 181   MCV fL 84              Last I/O:  No intake or output data in the 24 hours ending 07/26/25 0642  Net IO Since Admission: No IO data has been entered for this period [07/26/25 0642]        Inpatient Medications:  Scheduled Medications[1]  PRN Medications[2]  Continuous Medications[3]  Outpatient Medications:  Current Outpatient Medications   Medication Instructions    apixaban (ELIQUIS) 5 mg, oral, 2 times daily    aspirin 81 mg, oral, Daily    melatonin 5 mg tablet,chewable 1 tablet, Daily PRN    metoprolol succinate XL (TOPROL-XL) 200 mg, oral, Daily    nitroglycerin (NITROSTAT) 0.4 mg, sublingual, Every 5 min PRN, May repeat dose every 5 minutes for up to 3 doses total.    omeprazole (PRILOSEC) 40 mg, oral, 2 times daily before meals    phenylephrine 0.25 % suppository 1 suppository, rectal, 2 times daily    psyllium (Metamucil) 3.4 gram packet 1 packet, Daily    rosuvastatin (Crestor) 40 mg tablet 1 tablet, Daily       Physical Exam:  General: Pleasant male, alert and oriented, NAD  HEENT:  Pupils equal and reactive.  Normocephalic.  Moist mucosa.    Neck:  No thyromegaly.  Normal Jugular Venous Pressure.  Cardiovascular:  Regular rate and rhythm.  No cardiac murmurs noted.  Normal S1 and S2.  Pulmonary:  Clear to auscultation bilaterally.  Abdomen:  Soft. Non-tender.   Non-distended.  Positive bowel sounds.  Lower Extremities:  2+ pedal pulses. No LE edema.  Neurologic:  Cranial nerves intact.  No focal deficit.   Skin: Skin warm and dry, normal skin  nuno.   Psychiatric: Normal affect.     Assessment/Plan   Outpatient cardiologist Dr. Kenzie Aviles:  Eleno Kenney is a 65 y.o. male with a past medical history of permanent atrial fibrillation s/p PVI Ablation 6/6/25 on Eliquis, with Dr. Hernandez, hyperlipidemia, anxiety, GERD.  [place] [facility] [place] from [facility] with chest heaviness.  He had an abnormal coronary CTA 7/24/25 and is planned for left heart cath Monday. Cardiology consulted for further evaluation of unstable angina.    Home CV meds:  Eliquis 5 mg p.o. twice daily, metoprolol  mg p.o. daily, rosuvastatin 40 mg p.o. daily    I reviewed all EKGs, labs and imaging reports  I reviewed telemetry, SB in the 40-50's with occasional pvc's    1.  Unstable angina/abnormal coronary CT angiogram    2.  Permanent atrial fibrillation s/p PVI ablation on Eliquis and metoprolol XL    3.  Hyperlipidemia.  On outpatient rosuvastatin   as of 5/25          Recommendations are not final until signed by Cardiologist Dr. Randolph  ACS protocol:  c/w aspirin, statin as ordered  Hold metoprolol for now d/t bradycardia in the 40's  Hold Eliquis for now, will resume post [facility] per Cath lab protocol  N.p.o. after midnight Sunday for left heart cath Monday    Code Status:  Full Code    I spent 60 minutes in the professional and overall care of this patient.        Zaynab Álvarez, APRN-CNP         [1]   Scheduled medications   Medication Dose Route Frequency    amLODIPine  2.5 mg oral Daily    apixaban  5 mg oral BID    aspirin  81 mg oral Daily    melatonin  6 mg oral Nightly    metoprolol succinate XL  50 mg oral Daily    pantoprazole  40 mg oral Daily before breakfast    Or    pantoprazole  40 mg intravenous Daily before breakfast    psyllium  1 packet oral Daily    rosuvastatin  40 mg oral Daily   [2]   PRN medications   Medication    acetaminophen    Or    acetaminophen    Or    acetaminophen    morphine    morphine     nitroglycerin    ondansetron    Or    ondansetron   [3]   Continuous Medications   Medication Dose Last Rate

## 2025-07-26 NOTE — CONSULTS
Inpatient consult to Cardiology  Consult performed by: YASMANY Galeano-CNP  Consult ordered by: Srikanth Beach DO  Reason for consult: Unstable angina          History Of Present Illness:    Outpatient cardiologist Dr. Kenzie Aviles:  Eleno Kenney is a 65 y.o. male with a past medical history of permanent atrial fibrillation s/p PVI Ablation 6/6/25 on Eliquis, with Dr. Hernandez, hyperlipidemia, anxiety, GERD.  Patient presents to [facility] [place] 7/25/25 from [facility] with chest heaviness.  He had an abnormal coronary CTA 7/24/25 and is planned for left heart cath Monday. Cardiology consulted for further evaluation of unstable angina.    Patient reports that he is here for further evaluation of coronary artery disease found on his coronary CT angiogram.  He states that he underwent testing d/t his long history of hyperlipidemia (no cardiac complaints).  He has discussed results with his outpatient cardiologist Dr. Aviles and is agreeable to proceeding with left heart cath this coming Monday.  He reports that prior to yesterday he was feeling to be in his normal state of health, but when he woke up yesterday morning, he had a tightness in his chest.  He felt that it was related to his GERD and did not think much of symptoms.  He states that he reported his symptoms to his medical provider and was advised to go to the ED for further evaluation.  He denies any associated symptoms of chest discomfort radiation to his extremities, neck, jaw or back.  He denies any dyspnea, LOPEZ, lower extremity swelling, weight gain, PND, orthopnea, nausea, vomiting, diaphoresis, dizziness, lightheadedness, falls or syncope.  He does report  for months, occasionally waking up with mild fluttering in his chest that is brief and resolves without intervention.  Currently, patient reports feeling comfortable at rest with no further symptoms of chest tightness.    In addition, patient underwent an atrial fibrillation  ablation 6/25.  He reports compliance with his metoprolol and Eliquis without missing doses.  He does report that about a week ago he noted that his heart rate was in the 40s and that for the last few weeks he has felt foggy at times.  After discovering that his heart rate was in the 40s, he wonders if the fogginess was related.  He denies any past medical history of cardiac surgeries or coronary percutaneous interventions.      Hospital course:  Initial EKG: Marked sinus bradycardia with frequent PVCs, HR 48 (EKG from 7/26/25, copy in floor chart)  Arrival vital signs: Afebrile 97.7, HR 46, /67, 96% on room air  Current vital signs: /53, HR 46, 95% on room air  Pertinent imaging/Labs:  OSH trop: 10/8, , K3.8, CR 0.81, WBC 5.7, Hgb 12.0, , Trop today 8, chest x-ray clear      Home CV meds:  Eliquis 5 mg p.o. twice daily, metoprolol  mg p.o. daily, rosuvastatin 40 mg p.o. daily      All other systems reviewed and negative unless as mentioned in HPI.       Past Medical/ Surgical History:  Permanent atrial fibrillation s/p PVI ablation 6/6/25  Hyperlipidemia  Anxiety  GERD      Social History:  Denies smoking  Occasional alcohol use  Denies illicit drug use     Family History:  Reviewed, not pertinent to presenting problem        Past Cardiology Tests:  Coronary CT angiogram with heart flow 7/24/25:  IMPRESSION:  1. Overall moderate to severe coronary artery disease in a  right-dominant system.  2. Left main up to 25%.  3. The ostial and proximal LAD is heavily calcified with elements of  eccentric noncalcified complex plaque with stenosis between 50-70.  There is diminution in contrast the mid LAD appears to be patent.  Opacification of the distal LAD likely consistent with a significant  proximal stenosis.  4. Large ramus intermedius is calcified with ostial proximal stenosis  of greater than 70%.  5. Minimal nonobstructive disease in the RCA and LCX.  6. Elevated coronary artery  "calcium score of 437*.  7. Images were sent for noninvasive CT fractional flow reserve (FFR)  calculation using HeartFlow technology. Results will be reported in  an addendum.  8. Dilated main pulmonary artery measuring 3.2 cm. Correlate  clinically with history of elevated right heart pressures.  9. Images were sent for measurement of CT fractional flow reserve  (FFR).    Echocardiogram 24:  CONCLUSIONS:   1. Left ventricular ejection fraction is normal, calculated by Churchill's biplane at 63%.   2. Left ventricular diastolic filling cannot be determined, due to atrial fibrillation/flutter.   3. There is normal right ventricular global systolic function.   4. The left atrium is mildly dilated.   5. Right ventricular systolic pressure is within normal limits.   6. No prior echocardiogram available for comparison.   7. The patient is in atrial fibrillation which may influence the estimate of left ventricular function and transvalvular flows.         Allergies:  Patient has no known allergies.    ROS:  10 point review of systems including (Constitutional, Eyes, ENMT, Respiratory, Cardiac, Gastrointestinal, Neurological, Psychiatric, and Hematologic) was performed and is otherwise negative.    Objective Data:  Last Recorded Vitals:  Vitals:    25 2335 25 0415   BP: 122/67 125/53   BP Location: Right arm Right arm   Patient Position: Lying Lying   Pulse: (!) 46 (!) 46   Resp: 18 17   Temp: 36.5 °C (97.7 °F) 36.4 °C (97.6 °F)   TempSrc: Temporal Temporal   SpO2: 96% 95%   Weight: 87.6 kg (193 lb 3.2 oz)    Height: 1.803 m (5' 11\")      Medical Gas Therapy: None (Room air)  Weight  Av.9 kg (191 lb 9.6 oz)  Min: 86.2 kg (190 lb)  Max: 87.6 kg (193 lb 3.2 oz)      LABS:  CMP:  Results from last 7 days   Lab Units 25  0609 25  0816   [facility] SODIUM mmol/L  --  139   SODIUM mmol/L 140  --    [facility] POTASSIUM mmol/L  --  4.4   POTASSIUM mmol/L 3.8  --    [facility] CHLORIDE mmol/L  --  105   CHLORIDE " mmol/L 106  --    [facility] CO2 mmol/L  --  24   CO2 mmol/L 30  --    [facility] ANION GAP mmol/L (calc)  --  10   ANION GAP mmol/L 8*  --    BUN mg/dL 11  --    [facility] BUN mg/dL  --  14   CREATININE mg/dL 0.81  --    [facility] CREATININE mg/dL  --  1.03   [facility] EGFR mL/min/1.73m2  --  81   EGFR mL/min/1.73m*2 >90  --      CBC:  Results from last 7 days   Lab Units 07/26/25  0609   WBC AUTO x10*3/uL 5.7   HEMOGLOBIN g/dL 12.0*   HEMATOCRIT % 37.5*   PLATELETS AUTO x10*3/uL 181   MCV fL 84              Last I/O:  No intake or output data in the 24 hours ending 07/26/25 0642  Net IO Since Admission: No IO data has been entered for this period [07/26/25 0642]        Inpatient Medications:  Scheduled Medications[1]  PRN Medications[2]  Continuous Medications[3]  Outpatient Medications:  Current Outpatient Medications   Medication Instructions    apixaban (ELIQUIS) 5 mg, oral, 2 times daily    aspirin 81 mg, oral, Daily    melatonin 5 mg tablet,chewable 1 tablet, Daily PRN    metoprolol succinate XL (TOPROL-XL) 200 mg, oral, Daily    nitroglycerin (NITROSTAT) 0.4 mg, sublingual, Every 5 min PRN, May repeat dose every 5 minutes for up to 3 doses total.    omeprazole (PRILOSEC) 40 mg, oral, 2 times daily before meals    phenylephrine 0.25 % suppository 1 suppository, rectal, 2 times daily    psyllium (Metamucil) 3.4 gram packet 1 packet, Daily    rosuvastatin (Crestor) 40 mg tablet 1 tablet, Daily       Physical Exam:  General: Pleasant male, alert and oriented, NAD  HEENT:  Pupils equal and reactive.  Normocephalic.  Moist mucosa.    Neck:  No thyromegaly.  Normal Jugular Venous Pressure.  Cardiovascular:  Regular rate and rhythm.  No cardiac murmurs noted.  Normal S1 and S2.  Pulmonary:  Clear to auscultation bilaterally.  Abdomen:  Soft. Non-tender.   Non-distended.  Positive bowel sounds.  Lower Extremities:  2+ pedal pulses. No LE edema.  Neurologic:  Cranial nerves intact.  No focal deficit.   Skin: Skin warm and dry, normal skin  nuno.   Psychiatric: Normal affect.     Assessment/Plan   Outpatient cardiologist Dr. Kenzie Aviles:  Eleno Kenney is a 65 y.o. male with a past medical history of permanent atrial fibrillation s/p PVI Ablation 6/6/25 on Eliquis, with Dr. Hernandez, hyperlipidemia, anxiety, GERD.  Patient presents to [facility] [place] 7/25/25 from [facility] with chest heaviness.  He had an abnormal coronary CTA 7/24/25 and is planned for left heart cath Monday. Cardiology consulted for further evaluation of unstable angina.    Home CV meds:  Eliquis 5 mg p.o. twice daily, metoprolol  mg p.o. daily, rosuvastatin 40 mg p.o. daily    I reviewed all EKGs, labs and imaging reports  I reviewed telemetry, SB in the 40-50's with occasional pvc's    1.  Unstable angina/abnormal coronary CT angiogram    2.  Permanent atrial fibrillation s/p PVI ablation on Eliquis and metoprolol XL    3.  Hyperlipidemia.  On outpatient rosuvastatin   as of 5/25          Recommendations are not final until signed by Cardiologist Dr. Randolph  ACS protocol:  c/w aspirin, statin as ordered  Hold metoprolol for now d/t bradycardia in the 40's  Hold Eliquis for now, will resume post [facility] per Cath lab protocol  N.p.o. after midnight Sunday for left heart cath Monday    Code Status:  Full Code    I spent 60 minutes in the professional and overall care of this patient.        Zaynab Álvarez, APRN-CNP         [1]   Scheduled medications   Medication Dose Route Frequency    amLODIPine  2.5 mg oral Daily    apixaban  5 mg oral BID    aspirin  81 mg oral Daily    melatonin  6 mg oral Nightly    metoprolol succinate XL  50 mg oral Daily    pantoprazole  40 mg oral Daily before breakfast    Or    pantoprazole  40 mg intravenous Daily before breakfast    psyllium  1 packet oral Daily    rosuvastatin  40 mg oral Daily   [2]   PRN medications   Medication    acetaminophen    Or    acetaminophen    Or    acetaminophen    morphine    morphine     nitroglycerin    ondansetron    Or    ondansetron   [3]   Continuous Medications   Medication Dose Last Rate

## 2025-07-26 NOTE — H&P
History Of Present Illness  Eleno Kenney is a 65 y.o. male with a past medical history of hypertension, persistent atrial fibrillation requiring electrical cardioversion on apixaban, who had a abnormal CT of the coronaries LAD and Ramus stenosis.  He will need a heart catheterization to better define anatomy and determine treatment.  The patient had chest pain today and he was referred to the ER at [facility] and was transferred to [facility] for the left heart catheterization on Monday.  He denies any recent fever chills shortness of breath abdominal pain nausea vomiting or diarrhea     Past Medical History  Hypertension  Persistent atrial fibrillation  Hyperlipidemia  Hypogonadism  GERD  Anxiety   Tubular adenoma    Surgical History  Cardioversion 12/6/2024  Transesophageal echocardiogram  Electrophysiologic study 6/6/2025    Social History  He reports that he has never smoked. He has never used smokeless tobacco. He reports current alcohol use. He reports that he does not use drugs.    Family History  Family History[1]     Allergies  Patient has no known allergies.    Review of Systems   Constitutional:  Positive for activity change, appetite change and fatigue.   HENT: Negative.     Eyes: Negative.    Respiratory: Negative.     Cardiovascular:  Positive for chest pain.   Gastrointestinal: Negative.    Endocrine: Negative.    Genitourinary: Negative.    Musculoskeletal: Negative.    Skin: Negative.    Allergic/Immunologic: Negative.    Neurological: Negative.    Hematological: Negative.    Psychiatric/Behavioral: Negative.     All other systems reviewed and are negative.       Physical Exam  Vitals and nursing note reviewed.   Constitutional:       Appearance: Normal appearance.   HENT:      Head: Normocephalic.      Right Ear: External ear normal.      Left Ear: External ear normal.      Nose: Nose normal.      Mouth/Throat:      Mouth: Mucous membranes are dry.       Pharynx: Oropharynx is clear.     Eyes:      Extraocular Movements: Extraocular movements intact.      Conjunctiva/sclera: Conjunctivae normal.      Pupils: Pupils are equal, round, and reactive to light.       Cardiovascular:      Rate and Rhythm: Normal rate and regular rhythm.   Pulmonary:      Effort: Pulmonary effort is normal.      Breath sounds: Normal breath sounds.   Abdominal:      General: Abdomen is flat. Bowel sounds are normal.      Palpations: Abdomen is soft.     Musculoskeletal:         General: Normal range of motion.     Skin:     General: Skin is warm and dry.     Neurological:      General: No focal deficit present.      Mental Status: He is alert. Mental status is at baseline.     Psychiatric:         Mood and Affect: Mood normal.         Behavior: Behavior normal.          Last Recorded Vitals  There were no vitals taken for this visit.    Relevant Results  Meds:  Scheduled medications  Scheduled Medications[2]  Continuous medications  Continuous Medications[3]  PRN medications  PRN Medications[4]   Current Outpatient Medications   Medication Instructions    apixaban (ELIQUIS) 5 mg, oral, 2 times daily    aspirin 81 mg, oral, Daily    melatonin 5 mg tablet,chewable 1 tablet, Daily PRN    metoprolol succinate XL (TOPROL-XL) 200 mg, oral, Daily    nitroglycerin (NITROSTAT) 0.4 mg, sublingual, Every 5 min PRN, May repeat dose every 5 minutes for up to 3 doses total.    omeprazole (PRILOSEC) 40 mg, oral, 2 times daily before meals    phenylephrine 0.25 % suppository 1 suppository, rectal, 2 times daily    psyllium (Metamucil) 3.4 gram packet 1 packet, Daily    rosuvastatin (Crestor) 40 mg tablet 1 tablet, Daily        Labs:  No results found for this or any previous visit (from the past 24 hours).   Imaging:  Imaging  XR chest 1 view  Result Date: 7/25/2025  No pneumonia or pleural effusion.    CT angio coronary art with heartflow if score >30%  Result Date: 7/24/2025  1. Overall moderate to  severe coronary artery disease in a right-dominant system. 2. Left main up to 25%. 3. The ostial and proximal LAD is heavily calcified with elements of eccentric noncalcified complex plaque with stenosis between 50-70. There is diminution in contrast the mid LAD appears to be patent. Opacification of the distal LAD likely consistent with a significant proximal stenosis. 4. Large ramus intermedius is calcified with ostial proximal stenosis of greater than 70%. 5. Minimal nonobstructive disease in the RCA and LCX. 6. Elevated coronary artery calcium score of 437*. 7. Images were sent for noninvasive CT fractional flow reserve (FFR) calculation using HeartFlow technology. Results will be reported in an addendum. 8. Dilated main pulmonary artery measuring 3.2 cm. Correlate clinically with history of elevated right heart pressures. 9. Images were sent for measurement of CT fractional flow reserve (FFR).   *Coronary Artery  Agatston score   Yodit et al. JCCT 2016 (http://dx.[email or web address]/10.1016/j.[email or web address])   VIGIL 10-Year CHD Risk with Coronary Artery Calcification can be calculated using link below [email or web address] [place] et al. JACC 2015 (http://dx.[email or web address]/10.1016/j.j [email or web address])   Plaque analysis demonstrates a total coronary plaque volume of 347 mmï¿½ (70 % noncalcified, 30 % calcified). This is 46th percentile. For personalized, age- and sex-stratified percentile nomograms to aid in atherosclerotic plaque assessment, consider using the plaque calculator: https://www.RENTISH.com/plaque-calculator/   NONINVASIVE CT DERIVED FRACTIONAL FLOW RESERVE (FFR) USING HEART FLOW TECHNOLOGY.   FFR values were normal in the RCA measuring greater than 0.95. FFR values in the left circumflex were normal measuring greater than 0.94 proximally and 0.91 distally. FFR values in the left anterior descending artery were abnormal and measured 0.86 in the proximal segment, 0.80  in the mid, and 0.77 in the distal LAD. FFR values could not be assessed in the ramus intermedius. Interpretation. Abnormal FFR values in the ramus intermedius and in the mid to distal LAD.     Signed by: Ayo Patino 7/24/2025 6:16 PM Dictation workstation:   [id number]      Cardiology, Vascular, and Other Imaging  CT heartflow analysis  Result Date: 7/24/2025  These images are not reportable by radiology and will not be interpreted by  Radiologists.         Assessment/Plan   CAD/unstable angina with CTA of the coronaries showing significant LAD and Ramus stenosis  Plan:  Aspirin 81 mg orally daily  Morphine as needed  Nitroglycerin as needed  Lower dose of metoprolol due to bradycardia  Cardiology plans on left heart cath on Monday  Crestor 40 mg orally daily    Sinus bradycardia  Plan:  Lowered metoprolol dose from 200-50 with parameters    Hypertension  Metoprolol 50 mg orally daily  Amlodipine 2.5 mg orally daily    Persistent atrial fibrillation  Plan:  Will continue apixaban for now  Metoprolol dose lowered  He is currently sinus bradycardia  Telemetry    Hyperlipidemia  Plan:  Crestor 40 mg orally daily    GERD  Protonix 40 mg twice daily    DVT prophylaxis  Covered with apixaban  SCD    I spent 60 minutes in the professional and overall care of this patient.      Srikanth Beach DO                       [1]   Family History  Problem Relation Name Age of Onset    Cancer Father Anirudh Kenney     Cancer Father Anirudh Kenney    [2] amLODIPine, 2.5 mg, oral, Daily  apixaban, 5 mg, oral, BID  [START ON 7/26/2025] aspirin, 81 mg, oral, Daily  melatonin, 6 mg, oral, Nightly  [START ON 7/26/2025] metoprolol succinate XL, 50 mg, oral, Daily  [START ON 7/26/2025] pantoprazole, 40 mg, oral, Daily before breakfast   Or  [START ON 7/26/2025] pantoprazole, 40 mg, intravenous, Daily before breakfast  [START ON 7/26/2025] psyllium, 1 packet, oral, Daily  [START ON 7/26/2025] rosuvastatin, 40 mg, oral,  Daily    [3]    [4] PRN medications: acetaminophen **OR** acetaminophen **OR** acetaminophen, morphine, morphine, nitroglycerin, ondansetron **OR** ondansetron

## 2025-07-26 NOTE — PROGRESS NOTES
Eleno Kenney is a 65 y.o. male on day 1 of admission presenting with Unstable angina (Multi).      Subjective   No acute events overnight. Patient states that he never had chest pain. Soley experienced so tightness which has resolved. He denies palpations, shortness of breath at rest or with exertion.        Objective     Last Recorded Vitals  /72 (BP Location: Right arm, Patient Position: Lying)   Pulse (!) 46   Temp 36.5 °C (97.7 °F) (Temporal)   Resp 16   Wt 87.6 kg (193 lb 3.2 oz)   SpO2 95%   Intake/Output last 3 Shifts:    Intake/Output Summary (Last 24 hours) at 7/26/2025 1033  Last data filed at 7/26/2025 0659  Gross per 24 hour   Intake --   Output 450 ml   Net -450 ml       Admission Weight  Weight: 87.6 kg (193 lb 3.2 oz) (07/25/25 2335)    Daily Weight  07/25/25 : 87.6 kg (193 lb 3.2 oz)    Image Results  CT heartflow analysis  These images are not reportable by radiology and will not be interpreted   by  Radiologists.  CT angio coronary art with heartflow if score >30%  Narrative: Interpreted By:  Ayo Patino and Glidden Michael   STUDY:  CT ANGIO CORONARY ART WITH HEARTFLOW IF SCORE >30%;  7/24/2025 8:45 am      INDICATION:  Signs/Symptoms:exertional angina in pt with familial hyperlipidemia.  .  There is need to define coronary anatomy.      COMPARISON:  None.      ACCESSION NUMBER(S):  [id number]      ORDERING CLINICIAN:  ADRIANE RHODES      TECHNIQUE:  Using multi-detector CT technology,  axial, sequential imaging with  prospective gating was performed of the chest following the  intravenous administration of contrast material.  A low-osmolar  contrast agent was used.      The patient was premedicated with  i.v metoprolol if needed and 0.8  mg sublingual nitroglycerin for heart rate control and coronary  dilation, respectively.      For optimization of anatomic evaluation, multiplanar reconstruction,  maximum intensity projections, and advanced 3-D  off-line  postprocessing were performed on a dedicated stand-alone workstation  under the direct supervision of the interpreting physician.      FINDINGS:  POTENTIAL STUDY LIMITATIONS:  Beam hardening artifact from coronary  calcification.      CORONARY ANATOMY:  The left coronary artery arises from the left coronary cusp. Right  coronary artery originates normally from the right coronary cusp.      CORONARY CALCIUM SCORE:  LM 51.6  .1  LCx 4.2  RCA 27.9      Total 437      LEFT MAIN CORONARY ARTERY:  The left main is normal sized vessel that bifurcates into the LAD and  circumflex. There is focal ostial left main calcification with  luminal stenosis less than 25%. The distal left main calcification  with luminal stenosis up to 25%.      LEFT ANTERIOR DESCENDING ARTERY:  The LAD is a normal size vessel that  is widely patent.  It gives rise to  several acute diagonal branches.  The ostial and proximal LAD is has heavy, high-risk circumferential  calcification with elements of eccentric noncalcified plaque and  luminal stenosis potentially in excess of 70%.          LEFT CIRCUMFLEX ARTERY:  The LCX is a normal size vessel, which is  non-dominant.  It gives rise to  several obtuse marginal branches.  Ostial LCX has focal calcification luminal stenosis less than 25%.  Otherwise only mild luminal irregularities in the circumflex system.      RAMUS INTERMEDIUS: Ostial ramus is calcified with a luminal stenosis  of greater than 70%.      RIGHT CORONARY ARTERY:  The RCA is a normal size vessel, which is  dominant .  There are diffuse luminal irregularities throughout the right  coronary artery without significant luminal stenosis.      LEFT VENTRICLE AND RIGHT VENTRICLE  The cardiac chambers demonstrate normal atrioventricular and  ventriculoarterial concordance. The right and left ventricles are  normal in size.      PULMONARY ARTERY AND PULMONARY VEINS  The main PA measures 32mm. The right and left PAs are out of  the  field of view of this exam. The pulmonary veins are visualized and  drain into the left atrium.      THORACIC AORTA:  The visualized thoracic aorta is normal in course and contour. The  ascending thoracic aorta is only partly visualized and measures 30 mm  on axial images at the level of the main pulmonary artery. There is  no acute aortic pathology, such as dissection, intramural hematoma,  or contained rupture. The aortic arch is not included on this  examination.      IVC  The IVC is normal is diameter and drains into the right atrium.      PERICARDIUM:  There is no pericardial effusion of thickening.      CHEST:  The chest wall is normal.  No significant lymphadenopathy or mass is seen in limited images of  the mediastinum. Limited imaging through the lungs reveals no gross  abnormalities. No pleural effusion or pneumothorax.      UPPER ABDOMEN:  Limited imaging through the upper abdomen reveals no abnormalities of  the visualized organs.      Impression: 1. Overall moderate to severe coronary artery disease in a  right-dominant system.  2. Left main up to 25%.  3. The ostial and proximal LAD is heavily calcified with elements of  eccentric noncalcified complex plaque with stenosis between 50-70.  There is diminution in contrast the mid LAD appears to be patent.  Opacification of the distal LAD likely consistent with a significant  proximal stenosis.  4. Large ramus intermedius is calcified with ostial proximal stenosis  of greater than 70%.  5. Minimal nonobstructive disease in the RCA and LCX.  6. Elevated coronary artery calcium score of 437*.  7. Images were sent for noninvasive CT fractional flow reserve (FFR)  calculation using HeartFlow technology. Results will be reported in  an addendum.  8. Dilated main pulmonary artery measuring 3.2 cm. Correlate  clinically with history of elevated right heart pressures.  9. Images were sent for measurement of CT fractional flow reserve  (FFR).      *Coronary  Artery  Agatston score      Yodit et al. JCCT 2016 (http://dx.doi.org/10.1016/j.jcct.2016.11.003)      VIGIL 10-Year CHD Risk with Coronary Artery Calcification can be  calculated using link below  [email or web address]-[email or web address].[email or web address]  [place] et al. JACC 2015 (http://dx.doi.org/10.1016/j.j  acc.2015.08.035)      Plaque analysis demonstrates a total coronary plaque volume of 347  mmï¿½ (70 % noncalcified, 30 % calcified). This is 46th percentile. For  personalized, age- and sex-stratified percentile nomograms to aid in  atherosclerotic plaque assessment, consider using the plaque  calculator: https://www.[employer].com/plaque-calculator/      NONINVASIVE CT DERIVED FRACTIONAL FLOW RESERVE (FFR) USING HEART FLOW  TECHNOLOGY.      FFR values were normal in the RCA measuring greater than 0.95. FFR  values in the left circumflex were normal measuring greater than 0.94  proximally and 0.91 distally. FFR values in the left anterior  descending artery were abnormal and measured 0.86 in the proximal  segment, 0.80 in the mid, and 0.77 in the distal LAD. FFR values  could not be assessed in the ramus intermedius. Interpretation.  Abnormal FFR values in the ramus intermedius and in the mid to distal  LAD.          Signed by: Ayo Patino 7/24/2025 6:16 PM  Dictation workstation:   [id number]  ECG 12 Lead  Sinus bradycardia with frequent Premature ventricular complexes  Otherwise normal ECG  When compared with ECG of 02-JUN-2025 11:33,  Sinus rhythm has replaced Atrial fibrillation  Nonspecific T wave abnormality, improved in Lateral leads  Confirmed by Isael Ahn ([id number]) on 7/24/2025 4:29:57 PM      Physical Exam  Vitals and nursing note reviewed.   Constitutional:       Appearance: Normal appearance.   HENT:      Head: Normocephalic.      Right Ear: External ear normal.      Left Ear: External ear normal.      Nose: Nose normal.      Mouth/Throat:      Mouth: Mucous membranes are dry.       Pharynx: Oropharynx is clear.      Eyes:      Extraocular Movements: Extraocular movements intact.      Conjunctiva/sclera: Conjunctivae normal.      Pupils: Pupils are equal, round, and reactive to light.         Cardiovascular:      Rate and Rhythm: Normal rate and regular rhythm.   Pulmonary:      Effort: Pulmonary effort is normal.      Breath sounds: Normal breath sounds.   Abdominal:      General: Abdomen is flat. Bowel sounds are normal.      Palpations: Abdomen is soft.      Musculoskeletal:         General: Normal range of motion.      Skin:     General: Skin is warm and dry.      Neurological:      General: No focal deficit present.      Mental Status: He is alert. Mental status is at baseline.      Psychiatric:         Mood and Affect: Mood normal.         Behavior: Behavior normal.   Relevant Results             This patient currently has cardiac telemetry ordered; if you would like to modify or discontinue the telemetry order, click here to go to the orders activity to modify/discontinue the order.              Assessment & Plan  Unstable angina (Multi)    CAD/unstable angina with CTA of the coronaries showing significant LAD and Ramus stenosis  Plan:  Aspirin 81 mg orally daily  Morphine as needed  Nitroglycerin as needed  Lower dose of metoprolol due to bradycardia  Cardiology plans on left heart cath on Monday  Cardiology to see patient today   Crestor 40 mg orally daily     Sinus bradycardia  Plan:  Lowered metoprolol dose from 200-50 with parameters     Hypertension  Metoprolol 50 mg orally daily  Amlodipine 2.5 mg orally daily     Persistent atrial fibrillation  Plan:  Will continue apixaban for now  Metoprolol dose lowered  He is currently sinus bradycardia  Telemetry     Hyperlipidemia  Plan:  Crestor 40 mg orally daily     GERD  Protonix 40 mg twice daily     DVT prophylaxis  Covered with apixaban  SCD     I spent 30 minutes in the professional and overall care of this patient.    Full  Code        Miya Sales MD

## 2025-07-27 LAB
EKG ATRIAL RATE: 44 BPM
EKG ATRIAL RATE: 48 BPM
EKG P AXIS: 13 DEGREES
EKG P AXIS: 15 DEGREES
EKG P-R INTERVAL: 172 MS
EKG P-R INTERVAL: 178 MS
EKG Q-T INTERVAL: 494 MS
EKG Q-T INTERVAL: 506 MS
EKG QRS DURATION: 92 MS
EKG QRS DURATION: 92 MS
EKG QTC CALCULATION (BAZETT): 432 MS
EKG QTC CALCULATION (BAZETT): 441 MS
EKG R AXIS: 24 DEGREES
EKG R AXIS: 33 DEGREES
EKG T AXIS: 40 DEGREES
EKG T AXIS: 41 DEGREES
EKG VENTRICULAR RATE: 44 BPM
EKG VENTRICULAR RATE: 48 BPM

## 2025-07-27 PROCEDURE — 2500000002 HC RX 250 W HCPCS SELF ADMINISTERED DRUGS (ALT 637 FOR MEDICARE OP, ALT 636 FOR OP/ED): Performed by: INTERNAL MEDICINE

## 2025-07-27 PROCEDURE — 2500000001 HC RX 250 WO HCPCS SELF ADMINISTERED DRUGS (ALT 637 FOR MEDICARE OP): Performed by: INTERNAL MEDICINE

## 2025-07-27 PROCEDURE — 2060000001 HC INTERMEDIATE ICU ROOM DAILY

## 2025-07-27 PROCEDURE — 99232 SBSQ HOSP IP/OBS MODERATE 35: CPT | Performed by: INTERNAL MEDICINE

## 2025-07-27 PROCEDURE — 2500000005 HC RX 250 GENERAL PHARMACY W/O HCPCS: Performed by: INTERNAL MEDICINE

## 2025-07-27 RX ADMIN — ASPIRIN 81 MG: 81 TABLET, CHEWABLE ORAL at 09:15

## 2025-07-27 RX ADMIN — PSYLLIUM HUSK 1 PACKET: 3.4 POWDER ORAL at 09:15

## 2025-07-27 RX ADMIN — ROSUVASTATIN 40 MG: 20 TABLET, FILM COATED ORAL at 09:15

## 2025-07-27 RX ADMIN — Medication 6 MG: at 20:43

## 2025-07-27 RX ADMIN — PANTOPRAZOLE SODIUM 40 MG: 40 TABLET, DELAYED RELEASE ORAL at 06:15

## 2025-07-27 RX ADMIN — AMLODIPINE BESYLATE 2.5 MG: 2.5 TABLET ORAL at 09:15

## 2025-07-27 ASSESSMENT — COGNITIVE AND FUNCTIONAL STATUS - GENERAL
MOBILITY SCORE: 24
DAILY ACTIVITIY SCORE: 24

## 2025-07-27 ASSESSMENT — PAIN - FUNCTIONAL ASSESSMENT
PAIN_FUNCTIONAL_ASSESSMENT: 0-10
PAIN_FUNCTIONAL_ASSESSMENT: 0-10

## 2025-07-27 ASSESSMENT — PAIN SCALES - GENERAL
PAINLEVEL_OUTOF10: 0 - NO PAIN
PAINLEVEL_OUTOF10: 0 - NO PAIN

## 2025-07-27 NOTE — CARE PLAN
The patient's goals for the shift include Pt. will have a safe, restful and uneventful evening    The clinical goals for the shift include remain HD stable    Over the shift, the patient did not make progress toward the following goals. Barriers to progression include . Recommendations to address these barriers include   Problem: Pain - Adult  Goal: Verbalizes/displays adequate comfort level or baseline comfort level  Outcome: Progressing     Problem: Safety - Adult  Goal: Free from fall injury  Outcome: Progressing     Problem: Discharge Planning  Goal: Discharge to home or other facility with appropriate resources  Outcome: Progressing     Problem: Chronic Conditions and Co-morbidities  Goal: Patient's chronic conditions and co-morbidity symptoms are monitored and maintained or improved  Outcome: Progressing     Problem: Nutrition  Goal: Nutrient intake appropriate for maintaining nutritional needs  Outcome: Progressing   .

## 2025-07-27 NOTE — PROGRESS NOTES
"Subjective Data:  No new complaints. Denies further chest discomfort.        Objective Data:  Last Recorded Vitals:  Vitals:    25 2317 25 0417 25 0900 25 1215   BP: 110/65 129/64 138/77 130/74   BP Location: Right arm Right arm Left arm Left arm   Patient Position: Lying Lying Sitting Sitting   Pulse: 55 58  63   Resp: 18 18  18   Temp: 36.4 °C (97.5 °F) 36.6 °C (97.9 °F)  36.5 °C (97.7 °F)   TempSrc: Temporal Temporal  Temporal   SpO2: 100% 96%  98%   Weight:       Height:         Medical Gas Therapy: None (Room air)  Weight  Av.9 kg (191 lb 9.6 oz)  Min: 86.2 kg (190 lb)  Max: 87.6 kg (193 lb 3.2 oz)    LABS:  CMP:  Results from last 7 days   Lab Units 25  0609 25  0816   [facility] SODIUM mmol/L  --  139   SODIUM mmol/L 140  --    [facility] POTASSIUM mmol/L  --  4.4   POTASSIUM mmol/L 3.8  --    [facility] CHLORIDE mmol/L  --  105   CHLORIDE mmol/L 106  --    [facility] CO2 mmol/L  --  24   CO2 mmol/L 30  --    [facility] ANION GAP mmol/L (calc)  --  10   ANION GAP mmol/L 8*  --    BUN mg/dL 11  --    [facility] BUN mg/dL  --  14   CREATININE mg/dL 0.81  --    [facility] CREATININE mg/dL  --  1.03   [facility] EGFR mL/min/1.73m2  --  81   EGFR mL/min/1.73m*2 >90  --    MAGNESIUM mg/dL 2.15  --      CBC:  Results from last 7 days   Lab Units 25  0609   WBC AUTO x10*3/uL 5.7   HEMOGLOBIN g/dL 12.0*   HEMATOCRIT % 37.5*   PLATELETS AUTO x10*3/uL 181   MCV fL 84     COAG:     ABO: No results found for: \"ABO\"  HEME/ENDO:     CARDIAC:   Results from last 7 days   Lab Units 25  0609   TROPHS ng/L 8             Last I/O:    Intake/Output Summary (Last 24 hours) at 2025 1353  Last data filed at 2025 0810  Gross per 24 hour   Intake 250 ml   Output --   Net 250 ml     Net IO Since Admission: -200 mL [25 1353]            Inpatient Medications:  Scheduled Medications[1]  PRN Medications[2]  Continuous Medications[3]        Physical Exam:  Gen Well appearing middle aged male sitting up in NAD. " Body mass index is 26.95 kg/m².   CV SB. No m/r/g appreciated. No JVD or leg edema.   Pulm Lungs clear with normal respiratory effort.  Neuro Alert and conversant. Grossly nonfocal.    I reviewed Telemetry - SR / SB. PVC's.       Assessment:  Chest pain  Coronary artery disease  By coronary CT angiography with a borderline CT FFR result.  Hyperlipidemia   Atrial fibrillation s/p ablation    Recommendations:  Plan for coronary angiography Monday as scheduling allows. Holding Eliquis.           Corbin Randolph MD        [1]   Scheduled medications   Medication Dose Route Frequency    amLODIPine  2.5 mg oral Daily    aspirin  81 mg oral Daily    melatonin  6 mg oral Nightly    [Held by provider] metoprolol succinate XL  50 mg oral Daily    pantoprazole  40 mg oral Daily before breakfast    Or    pantoprazole  40 mg intravenous Daily before breakfast    perflutren lipid microspheres  0.5-10 mL of dilution intravenous Once in imaging    psyllium  1 packet oral Daily    rosuvastatin  40 mg oral Daily    sulfur hexafluoride microsphr  2 mL intravenous Once in imaging   [2]   PRN medications   Medication    acetaminophen    Or    acetaminophen    Or    acetaminophen    morphine    morphine    nitroglycerin    ondansetron    Or    ondansetron   [3]   Continuous Medications   Medication Dose Last Rate

## 2025-07-27 NOTE — SIGNIFICANT EVENT
NPO at [facility], [place]   CAD/unstable angina with CTA of the coronaries showing significant LAD and Ramus stenosis  Plan:  Aspirin 81 mg orally daily  Morphine as needed  Nitroglycerin as needed  Lower dose of metoprolol due to bradycardia  Cardiology plans on left heart cath on Monday  Cardiology to see patient today   Crestor 40 mg orally daily     Sinus bradycardia  Plan:  Lowered metoprolol dose from 200-50 with parameters     Hypertension  Metoprolol 50 mg orally daily  Amlodipine 2.5 mg orally daily     Persistent atrial fibrillation  Plan:  Will continue apixaban for now  Metoprolol dose lowered  He is currently sinus bradycardia  Telemetry     Hyperlipidemia  Plan:  Crestor 40 mg orally daily     GERD  Protonix 40 mg twice daily     DVT prophylaxis  Covered with apixaban  SCD

## 2025-07-27 NOTE — CARE PLAN
Problem: Pain - Adult  Goal: Verbalizes/displays adequate comfort level or baseline comfort level  Outcome: Progressing     Problem: Safety - Adult  Goal: Free from fall injury  Outcome: Progressing     Problem: Chronic Conditions and Co-morbidities  Goal: Patient's chronic conditions and co-morbidity symptoms are monitored and maintained or improved  Outcome: Progressing     Problem: ACS/CP/NSTEMI/STEMI  Goal: Chest pain managed (free from pain or at acceptable level)  Outcome: Progressing  Goal: Lab values return to normal range  Outcome: Progressing  Goal: Verbalize understanding of procedures/devices  Outcome: Progressing    Over the shift, patient remain hemodynamically stable. Safety maintained.Plan for left heart cath tomorrow.

## 2025-07-28 ENCOUNTER — APPOINTMENT (OUTPATIENT)
Dept: CARDIOLOGY | Facility: HOSPITAL | Age: 65
End: 2025-07-28
Payer: MEDICARE

## 2025-07-28 ENCOUNTER — APPOINTMENT (OUTPATIENT)
Dept: RADIOLOGY | Facility: CLINIC | Age: 65
End: 2025-07-28
Payer: MEDICARE

## 2025-07-28 VITALS
WEIGHT: 193.2 LBS | HEIGHT: 71 IN | OXYGEN SATURATION: 96 % | DIASTOLIC BLOOD PRESSURE: 76 MMHG | HEART RATE: 55 BPM | BODY MASS INDEX: 27.05 KG/M2 | RESPIRATION RATE: 18 BRPM | SYSTOLIC BLOOD PRESSURE: 148 MMHG | TEMPERATURE: 97.3 F

## 2025-07-28 LAB — ACT BLD: 334 SEC (ref 83–199)

## 2025-07-28 PROCEDURE — 4A023N7 MEASUREMENT OF CARDIAC SAMPLING AND PRESSURE, LEFT HEART, PERCUTANEOUS APPROACH: ICD-10-PCS | Performed by: INTERNAL MEDICINE

## 2025-07-28 PROCEDURE — 2500000004 HC RX 250 GENERAL PHARMACY W/ HCPCS (ALT 636 FOR OP/ED): Performed by: INTERNAL MEDICINE

## 2025-07-28 PROCEDURE — 93458 L HRT ARTERY/VENTRICLE ANGIO: CPT | Performed by: INTERNAL MEDICINE

## 2025-07-28 PROCEDURE — 2500000001 HC RX 250 WO HCPCS SELF ADMINISTERED DRUGS (ALT 637 FOR MEDICARE OP)

## 2025-07-28 PROCEDURE — 2550000001 HC RX 255 CONTRASTS: Performed by: INTERNAL MEDICINE

## 2025-07-28 PROCEDURE — 2500000004 HC RX 250 GENERAL PHARMACY W/ HCPCS (ALT 636 FOR OP/ED): Performed by: NURSE PRACTITIONER

## 2025-07-28 PROCEDURE — C1769 GUIDE WIRE: HCPCS | Performed by: INTERNAL MEDICINE

## 2025-07-28 PROCEDURE — 2500000001 HC RX 250 WO HCPCS SELF ADMINISTERED DRUGS (ALT 637 FOR MEDICARE OP): Performed by: INTERNAL MEDICINE

## 2025-07-28 PROCEDURE — 99152 MOD SED SAME PHYS/QHP 5/>YRS: CPT | Performed by: INTERNAL MEDICINE

## 2025-07-28 PROCEDURE — B2111ZZ FLUOROSCOPY OF MULTIPLE CORONARY ARTERIES USING LOW OSMOLAR CONTRAST: ICD-10-PCS | Performed by: INTERNAL MEDICINE

## 2025-07-28 PROCEDURE — 2780000003 HC OR 278 NO HCPCS: Performed by: INTERNAL MEDICINE

## 2025-07-28 PROCEDURE — 2500000002 HC RX 250 W HCPCS SELF ADMINISTERED DRUGS (ALT 637 FOR MEDICARE OP, ALT 636 FOR OP/ED): Performed by: INTERNAL MEDICINE

## 2025-07-28 PROCEDURE — 2060000001 HC INTERMEDIATE ICU ROOM DAILY

## 2025-07-28 PROCEDURE — 93571 IV DOP VEL&/PRESS C FLO 1ST: CPT | Mod: LD | Performed by: INTERNAL MEDICINE

## 2025-07-28 PROCEDURE — 85347 COAGULATION TIME ACTIVATED: CPT

## 2025-07-28 PROCEDURE — 99233 SBSQ HOSP IP/OBS HIGH 50: CPT | Performed by: STUDENT IN AN ORGANIZED HEALTH CARE EDUCATION/TRAINING PROGRAM

## 2025-07-28 PROCEDURE — 7100000002 HC RECOVERY ROOM TIME - EACH INCREMENTAL 1 MINUTE: Performed by: INTERNAL MEDICINE

## 2025-07-28 PROCEDURE — C1887 CATHETER, GUIDING: HCPCS | Performed by: INTERNAL MEDICINE

## 2025-07-28 PROCEDURE — C1894 INTRO/SHEATH, NON-LASER: HCPCS | Performed by: INTERNAL MEDICINE

## 2025-07-28 PROCEDURE — C1760 CLOSURE DEV, VASC: HCPCS | Performed by: INTERNAL MEDICINE

## 2025-07-28 PROCEDURE — 99153 MOD SED SAME PHYS/QHP EA: CPT | Performed by: INTERNAL MEDICINE

## 2025-07-28 PROCEDURE — 2500000005 HC RX 250 GENERAL PHARMACY W/O HCPCS: Performed by: NURSE PRACTITIONER

## 2025-07-28 PROCEDURE — 7100000001 HC RECOVERY ROOM TIME - INITIAL BASE CHARGE: Performed by: INTERNAL MEDICINE

## 2025-07-28 PROCEDURE — 93571 IV DOP VEL&/PRESS C FLO 1ST: CPT | Performed by: INTERNAL MEDICINE

## 2025-07-28 PROCEDURE — 2720000007 HC OR 272 NO HCPCS: Performed by: INTERNAL MEDICINE

## 2025-07-28 PROCEDURE — 99233 SBSQ HOSP IP/OBS HIGH 50: CPT

## 2025-07-28 RX ORDER — SODIUM CHLORIDE 9 MG/ML
3 INJECTION, SOLUTION INTRAVENOUS CONTINUOUS
Status: ACTIVE | OUTPATIENT
Start: 2025-07-28 | End: 2025-07-28

## 2025-07-28 RX ORDER — MIDAZOLAM HYDROCHLORIDE 1 MG/ML
INJECTION, SOLUTION INTRAMUSCULAR; INTRAVENOUS AS NEEDED
Status: DISCONTINUED | OUTPATIENT
Start: 2025-07-28 | End: 2025-07-28 | Stop reason: HOSPADM

## 2025-07-28 RX ORDER — HEPARIN SODIUM 1000 [USP'U]/ML
INJECTION, SOLUTION INTRAVENOUS; SUBCUTANEOUS AS NEEDED
Status: DISCONTINUED | OUTPATIENT
Start: 2025-07-28 | End: 2025-07-28 | Stop reason: HOSPADM

## 2025-07-28 RX ORDER — LIDOCAINE HYDROCHLORIDE 20 MG/ML
INJECTION, SOLUTION INFILTRATION; PERINEURAL AS NEEDED
Status: DISCONTINUED | OUTPATIENT
Start: 2025-07-28 | End: 2025-07-28 | Stop reason: HOSPADM

## 2025-07-28 RX ORDER — NITROGLYCERIN 40 MG/100ML
INJECTION INTRAVENOUS AS NEEDED
Status: DISCONTINUED | OUTPATIENT
Start: 2025-07-28 | End: 2025-07-28 | Stop reason: HOSPADM

## 2025-07-28 RX ORDER — VERAPAMIL HYDROCHLORIDE 2.5 MG/ML
INJECTION INTRAVENOUS AS NEEDED
Status: DISCONTINUED | OUTPATIENT
Start: 2025-07-28 | End: 2025-07-28 | Stop reason: HOSPADM

## 2025-07-28 RX ORDER — FENTANYL CITRATE 50 UG/ML
INJECTION, SOLUTION INTRAMUSCULAR; INTRAVENOUS AS NEEDED
Status: DISCONTINUED | OUTPATIENT
Start: 2025-07-28 | End: 2025-07-28 | Stop reason: HOSPADM

## 2025-07-28 RX ORDER — METOPROLOL SUCCINATE 25 MG/1
25 TABLET, EXTENDED RELEASE ORAL DAILY
Status: DISCONTINUED | OUTPATIENT
Start: 2025-07-28 | End: 2025-07-29 | Stop reason: HOSPADM

## 2025-07-28 RX ADMIN — PSYLLIUM HUSK 1 PACKET: 3.4 POWDER ORAL at 08:15

## 2025-07-28 RX ADMIN — ASPIRIN 81 MG: 81 TABLET, CHEWABLE ORAL at 08:15

## 2025-07-28 RX ADMIN — Medication 6 MG: at 20:28

## 2025-07-28 RX ADMIN — ROSUVASTATIN 40 MG: 20 TABLET, FILM COATED ORAL at 08:15

## 2025-07-28 RX ADMIN — METOPROLOL SUCCINATE 25 MG: 25 TABLET, EXTENDED RELEASE ORAL at 12:40

## 2025-07-28 RX ADMIN — SODIUM CHLORIDE 3 ML/KG/HR: 0.9 INJECTION, SOLUTION INTRAVENOUS at 18:54

## 2025-07-28 RX ADMIN — PANTOPRAZOLE SODIUM 40 MG: 40 TABLET, DELAYED RELEASE ORAL at 06:06

## 2025-07-28 RX ADMIN — SODIUM CHLORIDE 3 ML/KG/HR: 0.9 INJECTION, SOLUTION INTRAVENOUS at 17:37

## 2025-07-28 SDOH — ECONOMIC STABILITY: FOOD INSECURITY: HOW HARD IS IT FOR YOU TO PAY FOR THE VERY BASICS LIKE FOOD, HOUSING, MEDICAL CARE, AND HEATING?: NOT HARD AT ALL

## 2025-07-28 SDOH — HEALTH STABILITY: MENTAL HEALTH: HOW OFTEN DO YOU HAVE SIX OR MORE DRINKS ON ONE OCCASION?: NEVER

## 2025-07-28 SDOH — HEALTH STABILITY: MENTAL HEALTH: HOW MANY DRINKS CONTAINING ALCOHOL DO YOU HAVE ON A TYPICAL DAY WHEN YOU ARE DRINKING?: 1 OR 2

## 2025-07-28 SDOH — ECONOMIC STABILITY: FOOD INSECURITY: WITHIN THE PAST 12 MONTHS, YOU WORRIED THAT YOUR FOOD WOULD RUN OUT BEFORE YOU GOT THE MONEY TO BUY MORE.: NEVER TRUE

## 2025-07-28 SDOH — SOCIAL STABILITY: SOCIAL INSECURITY: ARE YOU MARRIED, WIDOWED, DIVORCED, SEPARATED, NEVER MARRIED, OR LIVING WITH A PARTNER?: MARRIED

## 2025-07-28 SDOH — SOCIAL STABILITY: SOCIAL INSECURITY: WITHIN THE LAST YEAR, HAVE YOU BEEN AFRAID OF YOUR PARTNER OR EX-PARTNER?: NO

## 2025-07-28 SDOH — ECONOMIC STABILITY: HOUSING INSECURITY: IN THE LAST 12 MONTHS, WAS THERE A TIME WHEN YOU WERE NOT ABLE TO PAY THE MORTGAGE OR RENT ON TIME?: NO

## 2025-07-28 SDOH — HEALTH STABILITY: PHYSICAL HEALTH
HOW OFTEN DO YOU NEED TO HAVE SOMEONE HELP YOU WHEN YOU READ INSTRUCTIONS, PAMPHLETS, OR OTHER WRITTEN MATERIAL FROM YOUR DOCTOR OR PHARMACY?: NEVER

## 2025-07-28 SDOH — ECONOMIC STABILITY: FOOD INSECURITY: WITHIN THE PAST 12 MONTHS, YOU WORRIED THAT YOUR FOOD WOULD RUN OUT BEFORE YOU GOT MONEY TO BUY MORE.: NEVER TRUE

## 2025-07-28 SDOH — HEALTH STABILITY: PHYSICAL HEALTH: ON AVERAGE, HOW MANY DAYS PER WEEK DO YOU ENGAGE IN MODERATE TO STRENUOUS EXERCISE (LIKE A BRISK WALK)?: 0 DAYS

## 2025-07-28 SDOH — ECONOMIC STABILITY: FOOD INSECURITY

## 2025-07-28 SDOH — ECONOMIC STABILITY: INCOME INSECURITY: IN THE LAST 12 MONTHS, WAS THERE A TIME WHEN YOU WERE NOT ABLE TO PAY THE MORTGAGE OR RENT ON TIME?: NO

## 2025-07-28 SDOH — HEALTH STABILITY: MENTAL HEALTH: HOW OFTEN DO YOU HAVE A DRINK CONTAINING ALCOHOL?: 2-4 TIMES A MONTH

## 2025-07-28 SDOH — ECONOMIC STABILITY: HOUSING INSECURITY: AT ANY TIME IN THE PAST 12 MONTHS, WERE YOU HOMELESS OR LIVING IN A SHELTER (INCLUDING NOW)?: NO

## 2025-07-28 SDOH — HEALTH STABILITY: MENTAL HEALTH
DO YOU FEEL STRESS - TENSE, RESTLESS, NERVOUS, OR ANXIOUS, OR UNABLE TO SLEEP AT NIGHT BECAUSE YOUR MIND IS TROUBLED ALL THE TIME - THESE DAYS?: ONLY A LITTLE

## 2025-07-28 SDOH — SOCIAL STABILITY: SOCIAL INSECURITY: WITHIN THE LAST YEAR, HAVE YOU BEEN HUMILIATED OR EMOTIONALLY ABUSED IN OTHER WAYS BY YOUR PARTNER OR EX-PARTNER?: NO

## 2025-07-28 SDOH — ECONOMIC STABILITY: INCOME INSECURITY: IN THE PAST 12 MONTHS HAS THE ELECTRIC, GAS, OIL, OR WATER COMPANY THREATENED TO SHUT OFF SERVICES IN YOUR HOME?: NO

## 2025-07-28 SDOH — ECONOMIC STABILITY: FOOD INSECURITY: WITHIN THE PAST 12 MONTHS, THE FOOD YOU BOUGHT JUST DIDN'T LAST AND YOU DIDN'T HAVE MONEY TO GET MORE.: NEVER TRUE

## 2025-07-28 SDOH — HEALTH STABILITY: PHYSICAL HEALTH: ON AVERAGE, HOW MANY MINUTES DO YOU ENGAGE IN EXERCISE AT THIS LEVEL?: 0 MIN

## 2025-07-28 SDOH — ECONOMIC STABILITY: TRANSPORTATION INSECURITY: IN THE PAST 12 MONTHS, HAS LACK OF TRANSPORTATION KEPT YOU FROM MEDICAL APPOINTMENTS OR FROM GETTING MEDICATIONS?: NO

## 2025-07-28 SDOH — ECONOMIC STABILITY: GENERAL

## 2025-07-28 SDOH — ECONOMIC STABILITY: HOUSING INSECURITY: IN THE PAST 12 MONTHS HAS THE ELECTRIC, GAS, OIL, OR WATER COMPANY THREATENED TO SHUT OFF SERVICES IN YOUR HOME?: NO

## 2025-07-28 SDOH — ECONOMIC STABILITY: TRANSPORTATION INSECURITY

## 2025-07-28 SDOH — ECONOMIC STABILITY: HOUSING INSECURITY

## 2025-07-28 SDOH — ECONOMIC STABILITY: HOUSING INSECURITY: IN THE LAST 12 MONTHS, HOW MANY PLACES HAVE YOU LIVED?: 1

## 2025-07-28 ASSESSMENT — PAIN - FUNCTIONAL ASSESSMENT
PAIN_FUNCTIONAL_ASSESSMENT: 0-10

## 2025-07-28 ASSESSMENT — COGNITIVE AND FUNCTIONAL STATUS - GENERAL
DAILY ACTIVITIY SCORE: 24
MOBILITY SCORE: 24
DAILY ACTIVITIY SCORE: 24
MOBILITY SCORE: 24

## 2025-07-28 ASSESSMENT — PAIN SCALES - GENERAL
PAINLEVEL_OUTOF10: 0 - NO PAIN

## 2025-07-28 ASSESSMENT — LIFESTYLE VARIABLES
AUDIT-C TOTAL SCORE: 2
SKIP TO QUESTIONS 9-10: 1

## 2025-07-28 ASSESSMENT — SOCIAL DETERMINANTS OF HEALTH (SDOH): IN THE PAST 12 MONTHS, HAS THE ELECTRIC, GAS, OIL, OR WATER COMPANY THREATENED TO SHUT OFF SERVICE IN YOUR HOME?: NO

## 2025-07-28 ASSESSMENT — ACTIVITIES OF DAILY LIVING (ADL)
LACK_OF_TRANSPORTATION: NO
LACK_OF_TRANSPORTATION: NO

## 2025-07-28 NOTE — INTERVAL H&P NOTE
H&P reviewed. The patient was examined and there are no changes to the H&P.      Here for [facility] with Dr. Valdez in the setting of cp, recent abnormal coronary CTA.     CCTA 7/24/25:   IMPRESSION:  1. Overall moderate to severe coronary artery disease in a  right-dominant system.  2. Left main up to 25%.  3. The ostial and proximal LAD is heavily calcified with elements of  eccentric noncalcified complex plaque with stenosis between 50-70.  There is diminution in contrast the mid LAD appears to be patent.  Opacification of the distal LAD likely consistent with a significant  proximal stenosis.  4. Large ramus intermedius is calcified with ostial proximal stenosis  of greater than 70%.  5. Minimal nonobstructive disease in the RCA and LCX.  6. Elevated coronary artery calcium score of 437*.  7. Images were sent for noninvasive CT fractional flow reserve (FFR)  calculation using HeartFlow technology. Results will be reported in  an addendum.  8. Dilated main pulmonary artery measuring 3.2 cm. Correlate  clinically with history of elevated right heart pressures.  9. Images were sent for measurement of CT fractional flow reserve  (FFR).      Last dose of Eliquis 7/26/25 ~947  ASA 81 mg PO taken today prior to arrival.     ASA Classification: III  Mallampati Score: Class III    Sedation plan and risks discussed with: patient     Clari Margaret JADE-CNP  Interventional Lab/Cardiology   [facility]

## 2025-07-28 NOTE — CARE PLAN
The patient's goals for the shift include Pt will experience no side effects from heart cath    The clinical goals for the shift include patient will remain free from bleeding at TR band      Problem: Safety - Adult  Goal: Free from fall injury  Outcome: Progressing     Problem: Pain - Adult  Goal: Verbalizes/displays adequate comfort level or baseline comfort level  Outcome: Progressing

## 2025-07-28 NOTE — PROGRESS NOTES
Eleno Kenney is a 65 y.o. male on day 3 of admission presenting with Unstable angina (Multi).      Subjective   No acute events overnight. Patient was ambulating well today. He denies shortness of breath, chest pain, or palpitations        Objective     Last Recorded Vitals  /67   Pulse 59   Temp 36.6 °C (97.9 °F) (Temporal)   Resp 16   Wt 87.5 kg (193 lb)   SpO2 97%   Intake/Output last 3 Shifts:    Intake/Output Summary (Last 24 hours) at 7/28/2025 1757  Last data filed at 7/28/2025 1735  Gross per 24 hour   Intake 500 ml   Output 0 ml   Net 500 ml       Admission Weight  Weight: 87.6 kg (193 lb 3.2 oz) (07/25/25 2335)    Daily Weight  07/28/25 : 87.5 kg (193 lb)    Image Results  Transthoracic Echo Complete     [facility] [place] [facility], [address] [place] [address]               Tel [phone] and Fax [phone]    TRANSTHORACIC ECHOCARDIOGRAM REPORT       Patient Name:       ELENO POWER       [place] Physician:    [id number] Corbin Randolph MD  Study Date:         7/26/2025           Ordering Provider:    [id number] TRACI DE LA VEGA  MRN/PID:            [id number]            Fellow:  Accession#:         [id number]        Nurse:  Date of Birth/Age:  1960 / 65      Sonographer:          Katty Aranda RDCS                      years  Gender assigned at  M                   Additional Staff:  Birth:  Height:             180.34 cm           Admit Date:           7/25/2025  Weight:             87.54 kg            Admission Status:     Inpatient -                                                                Priority                                                                discharge  BSA / BMI:          2.08 m2 / 26.92     Encounter#:           [id number]                      kg/m2  Blood Pressure:     133/72 mmHg         Department Location:  [facility]                                                                 Invasive    Study Type:    TRANSTHORACIC ECHO (TTE) COMPLETE  Diagnosis/ICD: Unstable angina-I20.0  Indication:    Unstable Angina  CPT Code:      Echo Complete w Full Doppler-[id number]    Patient History:  Pertinent History: A-Fib and Hyperlipidemia. GERD.    Study Detail: The following Echo studies were performed: 2D, M-Mode, Doppler and                color flow. Technically challenging study due to poor acoustic                windows and frequent ectopy. Optison used as a contrast agent for                endocardial border definition. Total contrast used for this                procedure was 3 mL via IV push.       PHYSICIAN INTERPRETATION:  Left Ventricle: Left ventricular ejection fraction is normal calculated by Churchill's biplane at 64%. There are no regional left ventricular wall motion abnormalities. The left ventricular cavity size is normal. There is normal septal and normal posterior left ventricular wall thickness. There is left ventricular concentric remodeling. Left ventricular diastolic filling is indeterminate.  Left Atrium: The left atrial size is normal.  Right Ventricle: The right ventricle is normal in size. There is normal right ventricular global systolic function.  Right Atrium: The right atrium is mild to moderately dilated.  Aortic Valve: The aortic valve is trileaflet. The aortic valve area by VTI is 2.63 cmï¿½ with a peak velocity of 1.43 m/s. The peak and mean gradients are 8 mmHg and 4 mmHg, respectively with a dimensionless index of 0.83. There is trace aortic valve regurgitation.  Mitral Valve: The mitral valve is normal in structure. There is trace to mild mitral valve regurgitation. The E Vmax is 1.21 m/s.  Tricuspid Valve: The tricuspid valve is structurally normal. There is trace tricuspid regurgitation. The Doppler estimated right ventricular systolic pressure (RVSP) is mildly elevated at 35 mmHg.  Pulmonic Valve: The  pulmonic valve is structurally normal. There is physiologic pulmonic valve regurgitation.  Pericardium: There is no pericardial effusion noted.  Aorta: The aortic root is normal. The aortic root is at the upper limits of normal size.  Systemic Veins: The inferior vena cava appears normal in size, with IVC inspiratory collapse greater than 50%.  In comparison to the previous echocardiogram(s): Compared with study dated 11/4/2024, the findings are similar.       CONCLUSIONS:   1. Left ventricular ejection fraction is normal calculated by Churchill's biplane at 64%.   2. Left ventricular diastolic filling is indeterminate.   3. There is normal right ventricular global systolic function.   4. The right atrium is mild to moderately dilated.   5. The Doppler estimated RVSP is mildly elevated at 35 mmHg.    QUANTITATIVE DATA SUMMARY:     2D MEASUREMENTS:          Normal Ranges:  LAs:             4.06 cm  (2.7-4.0cm)  IVSd:            0.97 cm  (0.6-1.1cm)  LVPWd:           0.95 cm  (0.6-1.1cm)  LVIDd:           4.42 cm  (3.9-5.9cm)  LVIDs:           3.22 cm  LV Mass Index:   68 g/m2  LVEDV Index:     66 ml/m2  LV % FS          27.2 %       LEFT ATRIUM:                  Normal Ranges:  LA Vol A4C:        72.7 ml    (22+/-6mL/m2)  LA Vol A2C:        72.7 ml  LA Vol BP:         75.5 ml  LA Vol Index A4C:  35.0ml/m2  LA Vol Index A2C:  35.0 ml/m2  LA Vol Index BP:   36.4 ml/m2  LA Area A4C:       24.3 cm2  LA Area A2C:       23.4 cm2  LA Major Axis A4C: 6.9 cm  LA Major Axis A2C: 6.4 cm  LA Volume Index:   36.1 ml/m2  LA Vol A4C:        67.3 ml  LA Vol A2C:        68.9 ml  LA Vol Index BSA:  32.8 ml/m2       RIGHT ATRIUM:                 Normal Ranges:  RA Vol A4C:        75.3 ml    (8.3-19.5ml)  RA Vol Index A4C:  36.3 ml/m2  RA Area A4C:       24.0 cm2  RA Major Axis A4C: 6.5 cm       AORTA MEASUREMENTS:         Normal Ranges:  Ao Sinus, d:        3.50 cm (2.1-3.5cm)  Ao STJ, d:          3.00 cm (1.7-3.4cm)  Asc Ao, d:           3.40 cm (2.1-3.4cm)       LV SYSTOLIC FUNCTION:                       Normal Ranges:  EF-A4C View:    66 % (>=55%)  EF-A2C View:    61 %  EF-Biplane:     64 %  LV EF Reported: 64 %       LV DIASTOLIC FUNCTION:             Normal Ranges:  MV Peak E:             1.21 m/s    (0.7-1.2 m/s)  MV Peak A:             0.41 m/s    (0.42-0.7 m/s)  E/A Ratio:             2.95        (1.0-2.2)  MV e'                  0.110 m/s   (>8.0)  MV lateral e'          0.14 m/s  MV medial e'           0.08 m/s  MV A Dur:              108.47 msec  E/e' Ratio:            10.99       (<8.0)  a'                     0.06 m/s  PulmV Sys Tyree:         72.48 cm/s  PulmV Avsquez Tyree:        98.42 cm/s  PulmV S/D Tyree:         0.74  PulmV A Revs Tyree:      20.09 cm/s  PulmV A Revs Dur:      97.05 msec       MITRAL VALVE:          Normal Ranges:  MV DT:        143 msec (150-240msec)       AORTIC VALVE:                     Normal Ranges:  AoV Vmax:                1.43 m/s (<=1.7m/s)  AoV Peak P.1 mmHg (<20mmHg)  AoV Mean PG:             3.9 mmHg (1.7-11.5mmHg)  LVOT Max Tyree:            1.17 m/s (<=1.1m/s)  AoV VTI:                 32.11 cm (18-25cm)  LVOT VTI:                26.66 cm  LVOT Diameter:           2.01 cm  (1.8-2.4cm)  AoV Area, VTI:           2.63 cm2 (2.5-5.5cm2)  AoV Area,Vmax:           2.61 cm2 (2.5-4.5cm2)  AoV Dimensionless Index: 0.83       RIGHT VENTRICLE:  RV Basal 4.10 cm  RV Mid   3.50 cm  RV Major 7.7 cm  TAPSE:   21.0 mm  RV s'    0.16 m/s       TRICUSPID VALVE/RVSP:          Normal Ranges:  Peak TR Velocity:     2.84 m/s  Est. RA Pressure:     3  RV Syst Pressure:     35       (< 30mmHg)  IVC Diam:             1.70 cm       PULMONIC VALVE:          Normal Ranges:  PV Accel Time:  143 msec (>120ms)  PV Max Tyree:     1.1 m/s  (0.6-0.9m/s)  PV Max P.8 mmHg       PULMONARY VEINS:  PulmV A Revs Dur: 97.05 msec  PulmV A Revs Tyree: 20.09 cm/s  PulmV Vasquez Tyree:   98.42 cm/s  PulmV S/D Tyree:    0.74  PulmV Sys Tyree:     72.48 cm/s       AORTA:  Asc Ao Diam 3.45 cm       [id number] Corbin Randolph MD  Electronically signed on 7/26/2025 at 2:38:39 PM       ** Final **      Physical Exam  Vitals and nursing note reviewed.   Constitutional:       Appearance: Normal appearance.   HENT:      Head: Normocephalic.      Right Ear: External ear normal.      Left Ear: External ear normal.      Nose: Nose normal.      Mouth/Throat:      Mouth: Mucous membranes are dry.      Pharynx: Oropharynx is clear.      Eyes:      Extraocular Movements: Extraocular movements intact.      Conjunctiva/sclera: Conjunctivae normal.      Pupils: Pupils are equal, round, and reactive to light.         Cardiovascular:      Rate and Rhythm: Normal rate and regular rhythm.   Pulmonary:      Effort: Pulmonary effort is normal.      Breath sounds: Normal breath sounds.   Abdominal:      General: Abdomen is flat. Bowel sounds are normal.      Palpations: Abdomen is soft.      Musculoskeletal:         General: Normal range of motion.      Skin:     General: Skin is warm and dry.      Neurological:      General: No focal deficit present.      Mental Status: He is alert. Mental status is at baseline.      Psychiatric:         Mood and Affect: Mood normal.         Behavior: Behavior normal.   Relevant Results             This patient currently has cardiac telemetry ordered; if you would like to modify or discontinue the telemetry order, click here to go to the orders activity to modify/discontinue the order.      Assessment & Plan  Unstable angina (Multi)    CAD/unstable angina with CTA of the coronaries showing significant LAD and Ramus stenosis  Plan:  :S/p cath today  [facility] revealed non flow limited LAD lesion (RFR mid LAD 0.91, proximal 0.94)   -cards following   -TR band removal and IVF post procedure per protocol  -Resume Eliquis 5 mg BID tomorrow AM  -Continue ASA, statin.       Aspirin 81 mg orally daily  Morphine as needed  Nitroglycerin as needed  Lower dose of  metoprolol due to bradycardia  Crestor 40 mg orally daily     Sinus bradycardia  Plan:  Lowered metoprolol dose from 200-50 with parameters     Hypertension  Metoprolol 50 mg orally daily  Amlodipine 2.5 mg orally daily     Persistent atrial fibrillation  Plan:  Holding s/p cath   Metoprolol dose lowered  He is currently sinus bradycardia  Telemetry     Hyperlipidemia  Plan:  Crestor 40 mg orally daily     GERD  Protonix 40 mg twice daily     DVT prophylaxis  Holding eliquis until tomorrow   SCD     I spent 30 minutes in the professional and overall care of this patient.   Dispo: dc in the AM pending furtheer cards recs   Full Code            Miya Sales MD

## 2025-07-28 NOTE — PROGRESS NOTES
"Subjective Data:  Patient sitting upright in chair, with wife at his bedside.   He reports feeling great, awaiting his coronary angiography today  He continues to report brief morning flutters in his chest  Since being off BB, he feels like he has more energy and no reports of fogginess    Will resume beta-blocker at low-dose to prevent sustained reentry atrial fibrillation-will monitor response    Overnight Events:    None reported     Objective Data:  Last Recorded Vitals:  Vitals:    25 0334 25 0400 25 0829 25 1126   BP: 132/82  131/67 126/62   BP Location: Left arm  Left arm Right arm   Patient Position: Lying  Sitting Sitting   Pulse: 55 53     Resp: 18  17 16   Temp: 36.1 °C (97 °F)  36.5 °C (97.7 °F) 36.5 °C (97.7 °F)   TempSrc: Temporal  Temporal Temporal   SpO2: 97%  96% 97%   Weight:       Height:         Medical Gas Therapy: None (Room air)  Weight  Av.9 kg (191 lb 9.6 oz)  Min: 86.2 kg (190 lb)  Max: 87.6 kg (193 lb 3.2 oz)    LABS:  CMP:  Results from last 7 days   Lab Units 25  0609 25  0816   [facility] SODIUM mmol/L  --  139   SODIUM mmol/L 140  --    [facility] POTASSIUM mmol/L  --  4.4   POTASSIUM mmol/L 3.8  --    [facility] CHLORIDE mmol/L  --  105   CHLORIDE mmol/L 106  --    [facility] CO2 mmol/L  --  24   CO2 mmol/L 30  --    [facility] ANION GAP mmol/L (calc)  --  10   ANION GAP mmol/L 8*  --    BUN mg/dL 11  --    [facility] BUN mg/dL  --  14   CREATININE mg/dL 0.81  --    [facility] CREATININE mg/dL  --  1.03   [facility] EGFR mL/min/1.73m2  --  81   EGFR mL/min/1.73m*2 >90  --    MAGNESIUM mg/dL 2.15  --      CBC:  Results from last 7 days   Lab Units 25  0609   WBC AUTO x10*3/uL 5.7   HEMOGLOBIN g/dL 12.0*   HEMATOCRIT % 37.5*   PLATELETS AUTO x10*3/uL 181   MCV fL 84     COAG:     ABO: No results found for: \"ABO\"  HEME/ENDO:     CARDIAC:   Results from last 7 days   Lab Units 25  0609   TROPHS ng/L 8             Last I/O:    Intake/Output Summary (Last 24 hours) at 2025 " 1146  Last data filed at 7/28/2025 1126  Gross per 24 hour   Intake 440 ml   Output --   Net 440 ml     Net IO Since Admission: 40 mL [07/28/25 1146]            Inpatient Medications:  Scheduled Medications[1]  PRN Medications[2]  Continuous Medications[3]    Physical Exam:  General: Pleasant male, alert and oriented, NAD  HEENT:  Pupils equal and reactive.  Normocephalic.  Moist mucosa.    Neck:  No thyromegaly.  Normal Jugular Venous Pressure.  Cardiovascular:  Regular rate and rhythm.  No cardiac murmurs noted.  Normal S1 and S2.  Pulmonary:  Clear to auscultation bilaterally.  Abdomen:  Soft. Non-tender.   Non-distended.  Positive bowel sounds.  Lower Extremities:  2+ pedal pulses. No LE edema.  Neurologic:  Cranial nerves intact.  No focal deficit.   Skin: Skin warm and dry, normal skin turgor.   Psychiatric: Normal affect.     Echocardiogram 7/26/2025:  CONCLUSIONS:   1. Left ventricular ejection fraction is normal calculated by Churchill's biplane at 64%.   2. Left ventricular diastolic filling is indeterminate.   3. There is normal right ventricular global systolic function.   4. The right atrium is mild to moderately dilated.   5. The Doppler estimated RVSP is mildly elevated at 35 mmHg.        Assessment/Plan   Outpatient cardiologist Dr. Kenzie Aviles:  Eleno Kenney is a 65 y.o. male with a past medical history of permanent atrial fibrillation s/p PVI Ablation 6/6/25 on Eliquis, with Dr. Hernandez, hyperlipidemia, anxiety, GERD.  Patient presents to [facility] 7/25/25 from [facility] with chest heaviness.  He had an abnormal coronary CTA 7/24/25 and is planned for left heart cath Monday. Cardiology consulted for further evaluation of unstable angina.    Home CV meds:  Eliquis 5 mg p.o. twice daily, metoprolol  mg p.o. daily, rosuvastatin 40 mg p.o. daily    I reviewed telemetry, short burst of AF, SB/SR rates 50-60's    Chest pain  Coronary artery disease  By coronary CT angiography  with a borderline CT FFR result.  Hyperlipidemia   Atrial fibrillation s/p ablation     Recommendations:  Plan for coronary angiography today as scheduling allows. Holding Eliquis.   Short burst of AF noted on telemetry, HR 50-60's- will restart Metop XL at reduced dose 25 daily    Code Status:  Full Code    I spent 45 minutes in the professional and overall care of this patient.        Zaynab Álvarez, APRN-CNP       [1]   Scheduled medications   Medication Dose Route Frequency    amLODIPine  2.5 mg oral Daily    aspirin  81 mg oral Daily    melatonin  6 mg oral Nightly    [Held by provider] metoprolol succinate XL  50 mg oral Daily    pantoprazole  40 mg oral Daily before breakfast    Or    pantoprazole  40 mg intravenous Daily before breakfast    perflutren lipid microspheres  0.5-10 mL of dilution intravenous Once in imaging    psyllium  1 packet oral Daily    rosuvastatin  40 mg oral Daily    sulfur hexafluoride microsphr  2 mL intravenous Once in imaging   [2]   PRN medications   Medication    acetaminophen    Or    acetaminophen    Or    acetaminophen    morphine    morphine    nitroglycerin    ondansetron    Or    ondansetron   [3]   Continuous Medications   Medication Dose Last Rate

## 2025-07-28 NOTE — SIGNIFICANT EVENT
Patient s/p LHC in the setting of cp, recent abnormal coronary CTA from 7/24/25.     LHC:   - R TRA 6F   - LHC revealed non flow limited LAD lesion (RFR mid LAD 0.91, proximal 0.94)  - LVEDP 16, no gradient across Lv-Ao      - R TRA 6F Ikari 3.75   - TR Band        Plan/Recs:   -TR band removal and IVF post procedure per protocol  -Resume Eliquis 5 mg BID tomorrow AM  -Continue ASA, statin.  -Further recs per primary and consulting teams, Cardiology following.    Clari Robles APRN-CNP  Interventional Lab/Cardiology   [facility]

## 2025-07-28 NOTE — PROGRESS NOTES
Pharmacy Medication History     Source of Information: patient    Additional concerns with the patient's PTA list.   N/a  Notified Provider via Haiku : No    The following updates were made to the Prior to Admission medication list:     Medications ADDED:   N/a  Medications CHANGED:  N/a  Medications REMOVED:   N/a  Medications NOT TAKING:   N/a    Allergy reviewed : Yes    Meds 2 Beds : No    Outpatient pharmacy confirmed and updated in chart : Yes    Pharmacy name: [facility], [place]    The list below reflectives the updated PTA list. Please review each medication in order reconciliation for additional clarification and justification.    Prior to Admission Medications   Prescriptions Last Dose Informant   apixaban (Eliquis) 5 mg tablet Unknown Self   Sig: Take 1 tablet (5 mg) by mouth 2 times a day.   aspirin 81 mg capsule Unknown Self   Sig: Take 81 mg by mouth once daily.   dibucaine (Nupercainal) 1 % ointment Unknown    Sig: Apply topically if needed for hemorrhoids for up to 10 days.   melatonin 5 mg tablet,chewable Unknown Self   Sig: Chew 1 tablet once daily as needed.   metoprolol succinate XL (Toprol-XL) 200 mg 24 hr tablet Unknown Self   Sig: Take 1 tablet (200 mg) by mouth once daily.   nitroglycerin (Nitrostat) 0.4 mg SL tablet Unknown Self   Sig: Place 1 tablet (0.4 mg) under the tongue every 5 minutes if needed for chest pain. May repeat dose every 5 minutes for up to 3 doses total.   omeprazole (PriLOSEC) 40 mg DR capsule Unknown Self   Sig: Take 1 capsule (40 mg) by mouth 2 times a day before meals.   Patient taking differently: Take 1 capsule (40 mg) by mouth 2 times a day before meals. Taking once a day   psyllium (Metamucil) 3.4 gram packet Unknown Self   Sig: Take 1 packet by mouth once daily.   Patient taking differently: Take 1 packet by mouth once daily. As needed   rosuvastatin (Crestor) 40 mg tablet Unknown Self   Sig: Take 1 tablet (40 mg) by mouth once daily.      Facility-Administered  Medications: None       The list below reflectives the updated allergy list. Please review each documented allergy for additional clarification and justification.    No Known Allergies       07/28/25 at 10:34 AM - Willis Merchant

## 2025-07-28 NOTE — PROGRESS NOTES
Eleno Kenney is a 65 y.o. male on day 3 of admission presenting with Unstable angina (Multi).    Plan:came to hospital for unstable angina, NPO for left heart cath today.   Disposition: Home with wife  Barrier: cardio recs  ADOD:  1-2 days       07/28/25 0953   Discharge Planning   Living Arrangements Spouse/significant other   Support Systems Spouse/significant other   Assistance Needed independent, drives, works, does not smoke, drinks only occasionally   Type of Residence Private residence   Number of Stairs to Enter Residence 3   Number of Stairs Within Residence 15  (does not go upstairs for anything)   Home or Post Acute Services None   Expected Discharge Disposition Home   Does the patient need discharge transport arranged? No   Patient Choice   Patient / Family choosing to utilize agency / facility established prior to hospitalization No   Stroke Family Assessment   Stroke Family Assessment Needed No   Intensity of Service   Intensity of Service 0-30 min       Marilu Ball RN

## 2025-07-28 NOTE — BRIEF OP NOTE
Physician Transition of Care Summary  Invasive Cardiovascular Lab    Procedure Date: 7/28/2025  Attending:    * Aden Valdez - Primary  Resident/Fellow/Other Assistant: Surgeons and Role:     * Mohsin S Mughal, MD - Fellow    Indications:   Pre-op Diagnosis      * Unstable angina (Multi) [I20.0]    Post-procedure diagnosis:   Post-op Diagnosis     * Unstable angina (Multi) [I20.0]    Procedure(s):   Left Heart Catheterization with Coronaries and Left Ventriculography  [id number] - MT CATH PLMT L HRT & ARTS W/NJX & ANGIO IMG S&I      Procedure Findings:     - R TRA 6F   - LHC revealed non flow limited LAD lesion (RFR distal LAD 0.91, proximal 0.94)  - LVEDP 16, no gradient across Lv-Ao    Description of the Procedure:      - R TRA 6F Ikari 3.75   - TR Band       Complications:     - None     Stents/Implants:   Implants       No implant documentation for this case.            Anticoagulation/Antiplatelet Plan:     -Can resume A Fib NOAC tomorrow    Estimated Blood Loss:     -10 mL    Anesthesia: Moderate Sedation Anesthesia Staff: No anesthesia staff entered.    Any Specimen(s) Removed:   No specimens collected during this procedure.    Disposition:   Floor, tele.    Electronically signed by: Mohsin S Mughal, MD, 7/28/2025 5:08 PM

## 2025-07-29 ENCOUNTER — TELEPHONE (OUTPATIENT)
Dept: CARDIOLOGY | Facility: HOSPITAL | Age: 65
End: 2025-07-29

## 2025-07-29 ENCOUNTER — PHARMACY VISIT (OUTPATIENT)
Dept: PHARMACY | Facility: CLINIC | Age: 65
End: 2025-07-29
Payer: COMMERCIAL

## 2025-07-29 VITALS
BODY MASS INDEX: 27.02 KG/M2 | DIASTOLIC BLOOD PRESSURE: 68 MMHG | OXYGEN SATURATION: 97 % | WEIGHT: 193 LBS | HEIGHT: 71 IN | TEMPERATURE: 97.7 F | SYSTOLIC BLOOD PRESSURE: 121 MMHG | HEART RATE: 55 BPM | RESPIRATION RATE: 18 BRPM

## 2025-07-29 LAB
ALBUMIN SERPL BCP-MCNC: 3.7 G/DL (ref 3.4–5)
ALP SERPL-CCNC: 51 U/L (ref 33–136)
ALT SERPL W P-5'-P-CCNC: 10 U/L (ref 10–52)
ANION GAP SERPL CALC-SCNC: 10 MMOL/L (ref 10–20)
AST SERPL W P-5'-P-CCNC: 12 U/L (ref 9–39)
ATRIAL RATE: 48 BPM
BASOPHILS # BLD AUTO: 0.03 X10*3/UL (ref 0–0.1)
BASOPHILS NFR BLD AUTO: 0.6 %
BILIRUB SERPL-MCNC: 0.5 MG/DL (ref 0–1.2)
BUN SERPL-MCNC: 10 MG/DL (ref 6–23)
CALCIUM SERPL-MCNC: 7.9 MG/DL (ref 8.6–10.3)
CHLORIDE SERPL-SCNC: 109 MMOL/L (ref 98–107)
CO2 SERPL-SCNC: 24 MMOL/L (ref 21–32)
CREAT SERPL-MCNC: 0.85 MG/DL (ref 0.5–1.3)
EGFRCR SERPLBLD CKD-EPI 2021: >90 ML/MIN/1.73M*2
EOSINOPHIL # BLD AUTO: 0.18 X10*3/UL (ref 0–0.7)
EOSINOPHIL NFR BLD AUTO: 3.4 %
ERYTHROCYTE [DISTWIDTH] IN BLOOD BY AUTOMATED COUNT: 14.1 % (ref 11.5–14.5)
GLUCOSE SERPL-MCNC: 107 MG/DL (ref 74–99)
HCT VFR BLD AUTO: 35.8 % (ref 41–52)
HGB BLD-MCNC: 11.9 G/DL (ref 13.5–17.5)
IMM GRANULOCYTES # BLD AUTO: 0.02 X10*3/UL (ref 0–0.7)
IMM GRANULOCYTES NFR BLD AUTO: 0.4 % (ref 0–0.9)
LYMPHOCYTES # BLD AUTO: 0.63 X10*3/UL (ref 1.2–4.8)
LYMPHOCYTES NFR BLD AUTO: 12 %
MAGNESIUM SERPL-MCNC: 2.29 MG/DL (ref 1.6–2.4)
MCH RBC QN AUTO: 27 PG (ref 26–34)
MCHC RBC AUTO-ENTMCNC: 33.2 G/DL (ref 32–36)
MCV RBC AUTO: 81 FL (ref 80–100)
MONOCYTES # BLD AUTO: 0.45 X10*3/UL (ref 0.1–1)
MONOCYTES NFR BLD AUTO: 8.6 %
NEUTROPHILS # BLD AUTO: 3.93 X10*3/UL (ref 1.2–7.7)
NEUTROPHILS NFR BLD AUTO: 75 %
NRBC BLD-RTO: 0 /100 WBCS (ref 0–0)
P AXIS: 15 DEGREES
P OFFSET: 197 MS
P ONSET: 130 MS
PLATELET # BLD AUTO: 164 X10*3/UL (ref 150–450)
POTASSIUM SERPL-SCNC: 4.2 MMOL/L (ref 3.5–5.3)
PR INTERVAL: 176 MS
PROT SERPL-MCNC: 5.5 G/DL (ref 6.4–8.2)
Q ONSET: 218 MS
QRS COUNT: 8 BEATS
QRS DURATION: 96 MS
QT INTERVAL: 486 MS
QTC CALCULATION(BAZETT): 434 MS
QTC FREDERICIA: 451 MS
R AXIS: 35 DEGREES
RBC # BLD AUTO: 4.4 X10*6/UL (ref 4.5–5.9)
SODIUM SERPL-SCNC: 139 MMOL/L (ref 136–145)
T AXIS: 29 DEGREES
T OFFSET: 461 MS
VENTRICULAR RATE: 48 BPM
WBC # BLD AUTO: 5.2 X10*3/UL (ref 4.4–11.3)

## 2025-07-29 PROCEDURE — 2500000001 HC RX 250 WO HCPCS SELF ADMINISTERED DRUGS (ALT 637 FOR MEDICARE OP): Performed by: NURSE PRACTITIONER

## 2025-07-29 PROCEDURE — 99233 SBSQ HOSP IP/OBS HIGH 50: CPT | Performed by: NURSE PRACTITIONER

## 2025-07-29 PROCEDURE — 85025 COMPLETE CBC W/AUTO DIFF WBC: CPT | Performed by: STUDENT IN AN ORGANIZED HEALTH CARE EDUCATION/TRAINING PROGRAM

## 2025-07-29 PROCEDURE — 83735 ASSAY OF MAGNESIUM: CPT | Performed by: STUDENT IN AN ORGANIZED HEALTH CARE EDUCATION/TRAINING PROGRAM

## 2025-07-29 PROCEDURE — 36415 COLL VENOUS BLD VENIPUNCTURE: CPT | Performed by: STUDENT IN AN ORGANIZED HEALTH CARE EDUCATION/TRAINING PROGRAM

## 2025-07-29 PROCEDURE — RXMED WILLOW AMBULATORY MEDICATION CHARGE

## 2025-07-29 PROCEDURE — 84075 ASSAY ALKALINE PHOSPHATASE: CPT | Performed by: STUDENT IN AN ORGANIZED HEALTH CARE EDUCATION/TRAINING PROGRAM

## 2025-07-29 PROCEDURE — 2500000002 HC RX 250 W HCPCS SELF ADMINISTERED DRUGS (ALT 637 FOR MEDICARE OP, ALT 636 FOR OP/ED): Performed by: NURSE PRACTITIONER

## 2025-07-29 PROCEDURE — 99239 HOSP IP/OBS DSCHRG MGMT >30: CPT | Performed by: INTERNAL MEDICINE

## 2025-07-29 RX ORDER — AMLODIPINE BESYLATE 2.5 MG/1
2.5 TABLET ORAL DAILY
Qty: 30 TABLET | Refills: 2 | Status: SHIPPED | OUTPATIENT
Start: 2025-07-30 | End: 2025-10-28

## 2025-07-29 RX ORDER — METOPROLOL SUCCINATE 50 MG/1
50 TABLET, EXTENDED RELEASE ORAL DAILY
Qty: 30 TABLET | Refills: 2 | Status: SHIPPED | OUTPATIENT
Start: 2025-07-30 | End: 2025-10-28

## 2025-07-29 RX ADMIN — ROSUVASTATIN 40 MG: 20 TABLET, FILM COATED ORAL at 08:01

## 2025-07-29 RX ADMIN — METOPROLOL SUCCINATE 25 MG: 25 TABLET, EXTENDED RELEASE ORAL at 08:01

## 2025-07-29 RX ADMIN — PANTOPRAZOLE SODIUM 40 MG: 40 TABLET, DELAYED RELEASE ORAL at 06:01

## 2025-07-29 RX ADMIN — APIXABAN 5 MG: 5 TABLET, FILM COATED ORAL at 08:02

## 2025-07-29 RX ADMIN — ASPIRIN 81 MG: 81 TABLET, CHEWABLE ORAL at 08:02

## 2025-07-29 RX ADMIN — AMLODIPINE BESYLATE 2.5 MG: 2.5 TABLET ORAL at 08:02

## 2025-07-29 ASSESSMENT — COGNITIVE AND FUNCTIONAL STATUS - GENERAL
MOBILITY SCORE: 24
DAILY ACTIVITIY SCORE: 24

## 2025-07-29 ASSESSMENT — PAIN SCALES - GENERAL
PAINLEVEL_OUTOF10: 0 - NO PAIN

## 2025-07-29 ASSESSMENT — PAIN - FUNCTIONAL ASSESSMENT
PAIN_FUNCTIONAL_ASSESSMENT: 0-10

## 2025-07-29 NOTE — CARE PLAN
The patient's goals for the shift include Pt. will have a safe, restful and uneventful evening    The clinical goals for the shift include patient will remain hemodynamically stable    Over the shift, the patient did not make progress toward the following goals. Barriers to progression include   Problem: Pain - Adult  Goal: Verbalizes/displays adequate comfort level or baseline comfort level  Outcome: Progressing   . Recommendations to address these barriers include   Problem: Safety - Adult  Goal: Free from fall injury  Outcome: Progressing   .

## 2025-07-29 NOTE — PROGRESS NOTES
"Subjective Data:  Feels well.   Improved symptoms (dizziness, fatigue) since beta blocker dose reduced.   No cp, sob.   Overnight Events:    None reported     Objective Data:    The patient's telemetry was reviewed. SB (upper 50s)/SR with frequent PVCs    Last Recorded Vitals:  Vitals:    25 0100 25 0400 25 0448 25 0747   BP: 136/72  145/71 136/61   BP Location: Right arm  Right arm Right arm   Patient Position: Lying  Lying Sitting   Pulse: 52 54 50 59   Resp: 17  17 16   Temp: 36.6 °C (97.8 °F)  36.2 °C (97.2 °F) 36.2 °C (97.2 °F)   TempSrc: Temporal  Temporal Temporal   SpO2: 96%  97% 99%   Weight:       Height:         Medical Gas Therapy: None (Room air)  Medical Gas Delivery Method: Nasal cannula  Weight  Av.1 kg (192 lb 1.1 oz)  Min: 86.2 kg (190 lb)  Max: 87.6 kg (193 lb 3.2 oz)    LABS:  CMP:  Results from last 7 days   Lab Units 25  0503 25  0609   SODIUM mmol/L 139 140   POTASSIUM mmol/L 4.2 3.8   CHLORIDE mmol/L 109* 106   CO2 mmol/L 24 30   ANION GAP mmol/L 10 8*   BUN mg/dL 10 11   CREATININE mg/dL 0.85 0.81   EGFR mL/min/1.73m*2 >90 >90   MAGNESIUM mg/dL 2.29 2.15   ALBUMIN g/dL 3.7  --    ALT U/L 10  --    AST U/L 12  --    BILIRUBIN TOTAL mg/dL 0.5  --      CBC:  Results from last 7 days   Lab Units 25  0503 25  0609   WBC AUTO x10*3/uL 5.2 5.7   HEMOGLOBIN g/dL 11.9* 12.0*   HEMATOCRIT % 35.8* 37.5*   PLATELETS AUTO x10*3/uL 164 181   MCV fL 81 84     COAG:     ABO: No results found for: \"ABO\"  HEME/ENDO:     CARDIAC:   Results from last 7 days   Lab Units 25  0609   TROPHS ng/L 8             Last I/O:    Intake/Output Summary (Last 24 hours) at 2025 1109  Last data filed at 2025 0940  Gross per 24 hour   Intake 1790.01 ml   Output 0 ml   Net 1790.01 ml     Net IO Since Admission: 1,830.01 mL [25 1109]            Inpatient Medications:  Scheduled Medications[1]  PRN Medications[2]  Continuous Medications[3]    Physical " Exam:  General: Pleasant male, alert and oriented, NAD  HEENT:  Pupils equal and reactive.  Normocephalic.  Moist mucosa.    Neck:  No thyromegaly.  Normal Jugular Venous Pressure.  Cardiovascular:  Regular rate and rhythm.  No cardiac murmurs noted.  Normal S1 and S2.  Pulmonary:  Clear to auscultation bilaterally.  Abdomen:  Soft. Non-tender.   Non-distended.  Positive bowel sounds.  Lower Extremities:  2+ pedal pulses. No LE edema.  Neurologic:  Cranial nerves intact.  No focal deficit.   Skin: Skin warm and dry, normal skin turgor. Right radial cath site with no bleeding, hematoma, drainage.   Psychiatric: Normal affect.    LHC 7/29/25:   - R TRA 6F   - LHC revealed non flow limited LAD lesion (RFR mid LAD 0.91, proximal 0.94)  - LVEDP 16, no gradient across Lv-Ao      - R TRA 6F Ikari 3.75   - TR Band          CTA Coronary 7/24/25:   IMPRESSION:  1. Overall moderate to severe coronary artery disease in a  right-dominant system.  2. Left main up to 25%.  3. The ostial and proximal LAD is heavily calcified with elements of  eccentric noncalcified complex plaque with stenosis between 50-70.  There is diminution in contrast the mid LAD appears to be patent.  Opacification of the distal LAD likely consistent with a significant  proximal stenosis.  4. Large ramus intermedius is calcified with ostial proximal stenosis  of greater than 70%.  5. Minimal nonobstructive disease in the RCA and LCX.  6. Elevated coronary artery calcium score of 437*.  7. Images were sent for noninvasive CT fractional flow reserve (FFR)  calculation using HeartFlow technology. Results will be reported in  an addendum.  8. Dilated main pulmonary artery measuring 3.2 cm. Correlate  clinically with history of elevated right heart pressures.  9. Images were sent for measurement of CT fractional flow reserve  (FFR).     Echocardiogram 7/26/2025:  CONCLUSIONS:   1. Left ventricular ejection fraction is normal calculated by Churchill's biplane at  64%.   2. Left ventricular diastolic filling is indeterminate.   3. There is normal right ventricular global systolic function.   4. The right atrium is mild to moderately dilated.   5. The Doppler estimated RVSP is mildly elevated at 35 mmHg.        Assessment/Plan   Outpatient cardiologist Dr. Kenzie Aviles:  Eleno Kenney is a 65 y.o. male with a past medical history of permanent atrial fibrillation s/p PVI Ablation 6/6/25 on Eliquis, with Dr. Hernandez, hyperlipidemia, anxiety, GERD.  Patient presents to [facility] 7/25/25 from [facility] with chest heaviness.  He had an abnormal coronary CTA 7/24/25 and is planned for left heart cath Monday. Cardiology consulted for further evaluation of unstable angina.    Home CV meds:  Eliquis 5 mg p.o. twice daily, metoprolol  mg p.o. daily, rosuvastatin 40 mg p.o. daily    I reviewed telemetry, short burst of AF, SB/SR rates 50-60's    Chest pain, no ACS. No significant obstructive CAD on [facility], see below.   Coronary artery disease  By coronary CT angiography with a borderline CT FFR result: [facility] with non flow limited LAD lesion (RFR mid LAD 0.91, proximal 0.94)   Hyperlipidemia   Atrial fibrillation s/p ablation  Frequent PVCs. Adjusting PO Metoprolol Succinate. Hx Event Monitor 1/2025 PVC (10% burden). HR range  bpm with Avg HR 76 bpm      Recommendations:  -Discharge with Metoprolol Succinate 50 mg daily.   -Continue Eliquis 5 mg BID.  -Continue ASA 81 mg daily, Rosuvastatin 40 mg, Amlodipine 2.5 mg daily  -Follow up with Dr. Aviles, Cardiology in the next month.     Code Status:  Full Code    I spent 45 minutes in the professional and overall care of this patient.        Clari Robles, YASMANY-CNP         [1]   Scheduled medications   Medication Dose Route Frequency    amLODIPine  2.5 mg oral Daily    apixaban  5 mg oral BID    aspirin  81 mg oral Daily    melatonin  6 mg oral Nightly    metoprolol succinate XL  25 mg oral Daily     pantoprazole  40 mg oral Daily before breakfast    Or    pantoprazole  40 mg intravenous Daily before breakfast    perflutren lipid microspheres  0.5-10 mL of dilution intravenous Once in imaging    psyllium  1 packet oral Daily    rosuvastatin  40 mg oral Daily    sulfur hexafluoride microsphr  2 mL intravenous Once in imaging   [2]   PRN medications   Medication    acetaminophen    Or    acetaminophen    Or    acetaminophen    morphine    morphine    nitroglycerin    ondansetron    Or    ondansetron    oxygen   [3]   Continuous Medications   Medication Dose Last Rate

## 2025-07-29 NOTE — DISCHARGE SUMMARY
Discharge Diagnosis  Unstable angina (Multi)           Discharge Meds     Medication List      START taking these medications     amLODIPine 2.5 mg tablet; Commonly known as: Norvasc; Take 1 tablet (2.5   mg) by mouth once daily.; Start taking on: July 30, 2025     CHANGE how you take these medications     metoprolol succinate XL 50 mg 24 hr tablet; Commonly known as:   Toprol-XL; Take 1 tablet (50 mg) by mouth once daily. Do not crush or   chew.; Start taking on: July 30, 2025; What changed: medication strength,   how much to take, additional instructions     CONTINUE taking these medications     apixaban 5 mg tablet; Commonly known as: Eliquis; Take 1 tablet (5 mg)   by mouth 2 times a day.   aspirin 81 mg capsule; Take 81 mg by mouth once daily.   melatonin 5 mg tablet,chewable   nitroglycerin 0.4 mg SL tablet; Commonly known as: Nitrostat; Place 1   tablet (0.4 mg) under the tongue every 5 minutes if needed for chest pain.   May repeat dose every 5 minutes for up to 3 doses total.   omeprazole 40 mg DR capsule; Commonly known as: PriLOSEC; Take 1 capsule   (40 mg) by mouth 2 times a day before meals.   psyllium 3.4 gram packet; Commonly known as: Metamucil   rosuvastatin 40 mg tablet; Commonly known as: Crestor     STOP taking these medications     dibucaine 1 % ointment; Commonly known as: Nupercainal   phenylephrine 0.25 % suppository       Test Results Pending At Discharge  Pending Labs       No current pending labs.            Hospital Course   Admitted for unstable angina. He reports recent ablation for afib 2 months ago. Underwent LHC and no PCI needed. Cardio recs to decreased home toprol from 200 --> 50, cont home asa and eliquis, and f/up with primary cardiologist. Norvasc also scripted. Echo NL EF. Cleared for dc by cardio. Dispo is home.     Pt clinically and hemodynamically stable, tolerating PO intake and room air.   Instructed to F/U with PCP within 5 days.   He understands and agrees with the dc  "plan.     More than 30 min spent on dc.       Pertinent Physical Exam At Time of Discharge  Physical Exam  Constitutional:       Appearance: Normal appearance.     Cardiovascular:      Rate and Rhythm: Normal rate and regular rhythm.   Pulmonary:      Effort: Pulmonary effort is normal.      Breath sounds: Normal breath sounds.   Abdominal:      General: Abdomen is flat. Bowel sounds are normal.      Palpations: Abdomen is soft.     Musculoskeletal:      Cervical back: Normal range of motion.     Skin:     General: Skin is warm.     Neurological:      General: No focal deficit present.      Mental Status: He is alert and oriented to person, place, and time. Mental status is at baseline.     Psychiatric:         Mood and Affect: Mood normal.         Behavior: Behavior normal.         Thought Content: Thought content normal.         Judgment: Judgment normal.     /61 (BP Location: Right arm, Patient Position: Sitting)   Pulse 59   Temp 36.2 °C (97.2 °F) (Temporal)   Resp 16   Ht 1.803 m (5' 11\")   Wt 87.5 kg (193 lb)   SpO2 99%   BMI 26.92 kg/m²       Outpatient Follow-Up  Future Appointments   Date Time Provider Department [place]   9/29/2025  4:00 PM Christen Delgado, APRN-CNP [id number] [facility]   10/16/2025  8:40 AM Kenzie Aviles MD [id number] [facility]         Cruz Peraza MD  "

## 2025-07-29 NOTE — PROGRESS NOTES
Eleno Kenney is a 65 y.o. male on day 4 of admission presenting with Unstable angina (Multi).    Plan:came to hospital for unstable angina, had C- cardiology following. To start back on eliquis today.  Disposition: Home with wife  Barrier: cardio recs  ADOD: today/tomorrow       07/29/25 0707   Discharge Planning   Living Arrangements Spouse/significant other   Home or Post Acute Services None   Expected Discharge Disposition Home   Intensity of Service   Intensity of Service 0-30 min       Marilu Ball RN

## 2025-08-18 ENCOUNTER — OFFICE VISIT (OUTPATIENT)
Dept: CARDIOLOGY | Facility: HOSPITAL | Age: 65
End: 2025-08-18
Payer: MEDICARE

## 2025-08-18 VITALS
OXYGEN SATURATION: 98 % | BODY MASS INDEX: 27.62 KG/M2 | SYSTOLIC BLOOD PRESSURE: 144 MMHG | HEIGHT: 71 IN | DIASTOLIC BLOOD PRESSURE: 66 MMHG | HEART RATE: 54 BPM | WEIGHT: 197.3 LBS

## 2025-08-18 DIAGNOSIS — E78.6 ELEVATED CHOLESTEROL/HIGH DENSITY LIPOPROTEIN RATIO: ICD-10-CM

## 2025-08-18 DIAGNOSIS — E78.5 HYPERLIPIDEMIA, UNSPECIFIED HYPERLIPIDEMIA TYPE: Primary | ICD-10-CM

## 2025-08-18 DIAGNOSIS — I48.91 ATRIAL FIBRILLATION, UNSPECIFIED TYPE (MULTI): ICD-10-CM

## 2025-08-18 DIAGNOSIS — E78.01 FAMILIAL HYPERCHOLESTEROLEMIA: ICD-10-CM

## 2025-08-18 DIAGNOSIS — I20.0 UNSTABLE ANGINA (MULTI): ICD-10-CM

## 2025-08-18 DIAGNOSIS — Z79.01 CHRONIC ANTICOAGULATION: ICD-10-CM

## 2025-08-18 PROCEDURE — 1159F MED LIST DOCD IN RCRD: CPT | Performed by: INTERNAL MEDICINE

## 2025-08-18 PROCEDURE — 1111F DSCHRG MED/CURRENT MED MERGE: CPT | Performed by: INTERNAL MEDICINE

## 2025-08-18 PROCEDURE — 1160F RVW MEDS BY RX/DR IN RCRD: CPT | Performed by: INTERNAL MEDICINE

## 2025-08-18 PROCEDURE — G2211 COMPLEX E/M VISIT ADD ON: HCPCS | Performed by: INTERNAL MEDICINE

## 2025-08-18 PROCEDURE — 99212 OFFICE O/P EST SF 10 MIN: CPT

## 2025-08-18 PROCEDURE — 99214 OFFICE O/P EST MOD 30 MIN: CPT | Performed by: INTERNAL MEDICINE

## 2025-08-18 PROCEDURE — 3008F BODY MASS INDEX DOCD: CPT | Performed by: INTERNAL MEDICINE

## 2025-08-18 RX ORDER — AMLODIPINE BESYLATE 2.5 MG/1
2.5 TABLET ORAL DAILY
Qty: 90 TABLET | Refills: 3 | Status: SHIPPED | OUTPATIENT
Start: 2025-08-18 | End: 2026-08-18

## 2025-08-18 RX ORDER — ROSUVASTATIN CALCIUM 40 MG/1
40 TABLET, COATED ORAL DAILY
Qty: 90 TABLET | Refills: 3 | Status: SHIPPED | OUTPATIENT
Start: 2025-08-18

## 2025-08-18 RX ORDER — METOPROLOL SUCCINATE 50 MG/1
50 TABLET, EXTENDED RELEASE ORAL DAILY
Qty: 90 TABLET | Refills: 3 | Status: SHIPPED | OUTPATIENT
Start: 2025-08-18 | End: 2026-08-18

## 2025-08-22 ENCOUNTER — TELEMEDICINE (OUTPATIENT)
Dept: PHARMACY | Facility: HOSPITAL | Age: 65
End: 2025-08-22
Payer: MEDICARE

## 2025-08-22 DIAGNOSIS — E78.01 FAMILIAL HYPERCHOLESTEROLEMIA: ICD-10-CM

## 2025-08-22 DIAGNOSIS — E78.5 HYPERLIPIDEMIA, UNSPECIFIED HYPERLIPIDEMIA TYPE: ICD-10-CM

## 2025-08-22 DIAGNOSIS — E78.6 ELEVATED CHOLESTEROL/HIGH DENSITY LIPOPROTEIN RATIO: ICD-10-CM

## 2025-09-03 ENCOUNTER — TELEPHONE (OUTPATIENT)
Age: 65
End: 2025-09-03

## 2025-09-03 ENCOUNTER — OFFICE VISIT (OUTPATIENT)
Dept: SURGERY | Age: 65
End: 2025-09-03
Payer: MEDICARE

## 2025-09-03 VITALS
HEART RATE: 53 BPM | RESPIRATION RATE: 16 BRPM | HEIGHT: 71 IN | DIASTOLIC BLOOD PRESSURE: 61 MMHG | SYSTOLIC BLOOD PRESSURE: 125 MMHG | BODY MASS INDEX: 27.58 KG/M2 | WEIGHT: 197 LBS

## 2025-09-03 DIAGNOSIS — Z80.0 FAMILY HX OF COLON CANCER: ICD-10-CM

## 2025-09-03 DIAGNOSIS — Z86.0101 HX OF ADENOMATOUS COLONIC POLYPS: Primary | ICD-10-CM

## 2025-09-03 PROBLEM — Z86.0100 HX OF COLONIC POLYPS: Status: ACTIVE | Noted: 2025-09-03

## 2025-09-03 PROCEDURE — 1036F TOBACCO NON-USER: CPT | Performed by: SURGERY

## 2025-09-03 PROCEDURE — 3017F COLORECTAL CA SCREEN DOC REV: CPT | Performed by: SURGERY

## 2025-09-03 PROCEDURE — 1123F ACP DISCUSS/DSCN MKR DOCD: CPT | Performed by: SURGERY

## 2025-09-03 PROCEDURE — 99213 OFFICE O/P EST LOW 20 MIN: CPT | Performed by: SURGERY

## 2025-09-03 PROCEDURE — G8419 CALC BMI OUT NRM PARAM NOF/U: HCPCS | Performed by: SURGERY

## 2025-09-03 PROCEDURE — G8427 DOCREV CUR MEDS BY ELIG CLIN: HCPCS | Performed by: SURGERY

## 2025-09-03 RX ORDER — SODIUM PICOSULFATE, MAGNESIUM OXIDE, AND ANHYDROUS CITRIC ACID 12; 3.5; 1 G/175ML; G/175ML; MG/175ML
175 LIQUID ORAL ONCE
Qty: 2 EACH | Refills: 0 | Status: SHIPPED | OUTPATIENT
Start: 2025-09-03 | End: 2025-09-03

## 2025-09-03 RX ORDER — AMLODIPINE BESYLATE 2.5 MG/1
2.5 TABLET ORAL DAILY
COMMUNITY
Start: 2025-08-18 | End: 2026-08-18

## 2025-09-03 RX ORDER — METOPROLOL SUCCINATE 50 MG/1
50 TABLET, EXTENDED RELEASE ORAL DAILY
COMMUNITY

## 2025-09-03 ASSESSMENT — LIFESTYLE VARIABLES: HOW OFTEN DO YOU HAVE A DRINK CONTAINING ALCOHOL: MONTHLY OR LESS

## (undated) DEVICE — INTRODUCER, SHEATH, FAST-CATH, 8FR X 12CM, C-LOCK

## (undated) DEVICE — CATHETER, PENTARAY, NAV ECO, 7FR, 2-6-2 SPACING, D CURVE

## (undated) DEVICE — CONTAINER SPEC 60ML PH 7NEUTRAL BUFF FRMLN RDY TO USE

## (undated) DEVICE — CATHETER, DIAGNOSTIC, SOUNDSTAR ECO SMS, 8FR

## (undated) DEVICE — PATCHES, EXTERNAL REFERENCE, CARTO3

## (undated) DEVICE — CONNECTOR IRRIGATION AUXILIARY H2O JET W/ PRT MTL THRD HYDR

## (undated) DEVICE — INTERFACE CABLE, EXISITING DX CATHETERS, BLUE PORT (REPROCESS)

## (undated) DEVICE — PAD, ELECTRODE DEFIB PADPRO ADULT STRL

## (undated) DEVICE — GUIDEWIRE, PRESSURE WIRE X , 175CM WIRELESS, AGILE TIP

## (undated) DEVICE — Z DISCONTINUED NO SUB IDED TUBING ETCO2 AD L6.5FT NSL ORAL CVD PRNG NONFLARED TIP OVR

## (undated) DEVICE — CATHETHER, CS, BI-DIRECTIONAL, 10 POLES, D-F TYPE

## (undated) DEVICE — INTRODUCER, HEMOSTASIS, STR/J .038 IN, 8.5FR 12CM

## (undated) DEVICE — CATHETER, THERMOCOOL SMART TOUCH, SF, D-F CURVE

## (undated) DEVICE — CABLE, CONNECTOR, 10FT (REPROCESSED)

## (undated) DEVICE — GUIDEWIRE, INQWIRE, 3MM J, .035, 260

## (undated) DEVICE — CABLE, CARTO 3 SYSTEM, ECO INTERFACE, 34-PIN, SPLIT HANDLE (REPROCESSED)

## (undated) DEVICE — INTRODUCER, GLIDESHEATH SLENDER A-KIT, 6FR 10CM

## (undated) DEVICE — CATHETER, DIAGNOSTIC, 4 FR-JR 4

## (undated) DEVICE — DEFENDO AIR WATER SUCTION AND BIOPSY VALVE KIT FOR  OLYMPUS: Brand: DEFENDO AIR/WATER/SUCTION AND BIOPSY VALVE

## (undated) DEVICE — CATHETER, GUIDING, HEARTRAIL III, 6 FR IKARI LEFT 3.75

## (undated) DEVICE — NEEDLE, NRG TRANSSEPTAL, 98CM, CURVE C0

## (undated) DEVICE — CABLE, 34 HYP, 34 LEMO, 10FT, SMART TOUCH (REPROCESS)

## (undated) DEVICE — SHEATH, STEERABLE, SUREFLEX, MEDIUM CURVE

## (undated) DEVICE — DEVICE, CLOSURE, PERCLOSE, PROSTYLE

## (undated) DEVICE — FORCEPS BX L240CM JAW DIA2.4MM WRK CHN 2.8MM ORNG L CAP W/

## (undated) DEVICE — CATHETER, ANGIO, IMPULSE, FL3.5, 5 FR X 100 CM

## (undated) DEVICE — VALVE, HEMOSTASIS, GUARDIAN II NC, W/ GUIDEWIRE INSERTION TOOL

## (undated) DEVICE — SHIELD, RADPAD, EP LEFT SUBCLAVIAN, 12.5 X 16.5, YELLOW LEVEL ATTENUATION

## (undated) DEVICE — SPONGE GZ W4XL4IN RAYON POLY FILL CVR W/ NONWOVEN FAB

## (undated) DEVICE — TR BAND, RADIAL COMPRESSION, STANDARD, 24CM

## (undated) DEVICE — TUBING SET, IRRIGATION, SMARTABLATE